# Patient Record
Sex: FEMALE | Race: WHITE | NOT HISPANIC OR LATINO | Employment: FULL TIME | URBAN - METROPOLITAN AREA
[De-identification: names, ages, dates, MRNs, and addresses within clinical notes are randomized per-mention and may not be internally consistent; named-entity substitution may affect disease eponyms.]

---

## 2021-04-20 ENCOUNTER — OFFICE VISIT CONVERTED (OUTPATIENT)
Dept: FAMILY MEDICINE CLINIC | Facility: CLINIC | Age: 46
End: 2021-04-20
Attending: NURSE PRACTITIONER

## 2021-04-20 ENCOUNTER — HOSPITAL ENCOUNTER (OUTPATIENT)
Dept: LAB | Facility: HOSPITAL | Age: 46
Discharge: HOME | End: 2021-04-20
Attending: NURSE PRACTITIONER

## 2021-04-20 LAB
ALBUMIN SERPL-MCNC: 4.2 G/DL (ref 3.5–5)
ALBUMIN/GLOB SERPL: 1.2 {RATIO} (ref 1.4–2.6)
ALP SERPL-CCNC: 47 U/L (ref 42–98)
ALT SERPL-CCNC: 46 U/L (ref 10–40)
ANION GAP SERPL CALC-SCNC: 17 MMOL/L (ref 8–19)
AST SERPL-CCNC: 50 U/L (ref 15–50)
BASOPHILS # BLD AUTO: 0.05 10*3/UL (ref 0–0.2)
BASOPHILS NFR BLD AUTO: 0.9 % (ref 0–3)
BILIRUB SERPL-MCNC: 0.54 MG/DL (ref 0.2–1.3)
BUN SERPL-MCNC: 13 MG/DL (ref 5–25)
BUN/CREAT SERPL: 20 {RATIO} (ref 6–20)
CALCIUM SERPL-MCNC: 9.8 MG/DL (ref 8.7–10.4)
CHLORIDE SERPL-SCNC: 100 MMOL/L (ref 99–111)
CHOLEST SERPL-MCNC: 107 MG/DL (ref 107–200)
CHOLEST/HDLC SERPL: 3.1 {RATIO} (ref 3–6)
CONV ABS IMM GRAN: 0.02 10*3/UL (ref 0–0.2)
CONV CO2: 23 MMOL/L (ref 22–32)
CONV CREATININE URINE, RANDOM: 93.1 MG/DL (ref 10–300)
CONV IMMATURE GRAN: 0.4 % (ref 0–1.8)
CONV MICROALBUM.,U,RANDOM: 16.3 MG/L (ref 0–20)
CONV TOTAL PROTEIN: 7.6 G/DL (ref 6.3–8.2)
CREAT UR-MCNC: 0.65 MG/DL (ref 0.5–0.9)
DEPRECATED RDW RBC AUTO: 44 FL (ref 36.4–46.3)
EOSINOPHIL # BLD AUTO: 0.12 10*3/UL (ref 0–0.7)
EOSINOPHIL # BLD AUTO: 2.3 % (ref 0–7)
ERYTHROCYTE [DISTWIDTH] IN BLOOD BY AUTOMATED COUNT: 12.8 % (ref 11.7–14.4)
EST. AVERAGE GLUCOSE BLD GHB EST-MCNC: 183 MG/DL
GFR SERPLBLD BASED ON 1.73 SQ M-ARVRAT: >60 ML/MIN/{1.73_M2}
GLOBULIN UR ELPH-MCNC: 3.4 G/DL (ref 2–3.5)
GLUCOSE SERPL-MCNC: 210 MG/DL (ref 65–99)
HBA1C MFR BLD: 8 % (ref 3.5–5.7)
HCT VFR BLD AUTO: 41.8 % (ref 37–47)
HDLC SERPL-MCNC: 35 MG/DL (ref 40–60)
HGB BLD-MCNC: 13.7 G/DL (ref 12–16)
LDLC SERPL CALC-MCNC: <10 MG/DL (ref 70–100)
LYMPHOCYTES # BLD AUTO: 2.11 10*3/UL (ref 1–5)
LYMPHOCYTES NFR BLD AUTO: 40 % (ref 20–45)
MCH RBC QN AUTO: 30.7 PG (ref 27–31)
MCHC RBC AUTO-ENTMCNC: 32.8 G/DL (ref 33–37)
MCV RBC AUTO: 93.7 FL (ref 81–99)
MICROALBUMIN/CREAT UR: 17.5 MG/G{CRE} (ref 0–35)
MONOCYTES # BLD AUTO: 0.47 10*3/UL (ref 0.2–1.2)
MONOCYTES NFR BLD AUTO: 8.9 % (ref 3–10)
NEUTROPHILS # BLD AUTO: 2.51 10*3/UL (ref 2–8)
NEUTROPHILS NFR BLD AUTO: 47.5 % (ref 30–85)
NRBC CBCN: 0 % (ref 0–0.7)
OSMOLALITY SERPL CALC.SUM OF ELEC: 288 MOSM/KG (ref 273–304)
PLATELET # BLD AUTO: 258 10*3/UL (ref 130–400)
PMV BLD AUTO: 11.1 FL (ref 9.4–12.3)
POTASSIUM SERPL-SCNC: 4.1 MMOL/L (ref 3.5–5.3)
RBC # BLD AUTO: 4.46 10*6/UL (ref 4.2–5.4)
SODIUM SERPL-SCNC: 136 MMOL/L (ref 135–147)
T4 FREE SERPL-MCNC: 1.3 NG/DL (ref 0.9–1.8)
TRIGL SERPL-MCNC: 343 MG/DL (ref 40–150)
TSH SERPL-ACNC: 1.46 M[IU]/L (ref 0.27–4.2)
VLDLC SERPL-MCNC: 69 MG/DL (ref 5–37)
WBC # BLD AUTO: 5.28 10*3/UL (ref 4.8–10.8)

## 2021-05-11 NOTE — H&P
History and Physical      Patient Name: Katy Denise   Patient ID: [id number]   Sex: Female   YOB: 1975        Visit Date: April 20, 2021    Provider: DELIO Gutierrez   Location: [facility]   Location Address: [facility], [address]  [place], [place]  [id number]   Location Phone: [phone]          Chief Complaint  · new patient/establish care      History Of Present Illness  Katy Denise is a 46 year old female who presents for evaluation and treatment of:      Pt is here to establish care with a new provider. Her previous was in [place], Addie Simon at [facility]. Her last visit was in January.     Labs: she had her A1C checked on Jan 7th, unsure of the last full set of labs. Is fasting and can do today. She has brought her biometric screening form to be completed.    Pap Smear: no longer, had full Hysterectomy.    Mammogram: 11/2020 in [place]    Flu: declined    Covid: had not received yet    Pt needs refills on all her medications.    Pt is wanting to discuss weight loss. She has been doing dietary changes and working out and cannot lose weight. She would like some help in doing so. She thinks it might be related to her anxiety/depression that was never addressed by her previous provider.           Past Medical History  Disease Name Date Onset Notes   Allergies --  --    Anxiety --  --    Arthritis --  --    Diabetes --  --    Hyperlipidemia --  --    Migraine --  --          Past Surgical History  Procedure Name Date Notes   C section --  --    carpal tunnel --  bilateral   Hysterectomy --  --    Microdiscectomy of thoracic or lumbar spine --  --    Tubal ligation --  --          Medication List  Name Date Started Instructions   Benadryl 25 mg oral capsule  take 2 capsules (50 mg) by oral route once daily at bedtime as needed   Crestor 5 mg oral tablet 04/20/2021 take 1 tablet (5 mg) by oral route once daily for 90 days   EpiPen 0.3 mg/0.3 mL  injection auto-injector  inject 0.3 milliliter (0.3 mg) by intramuscular route once as needed for anaphylaxis   hyoscyamine sulfate 0.125 mg oral tablet  take 1 tablet (0.125 mg) by oral route 3 times per day   losartan 25 mg oral tablet 04/20/2021 take 1 tablet (25 mg) by oral route once daily for 90 days   melatonin 10 mg oral capsule  take 1 capsule by oral route once a day (at bedtime)   metformin 1,000 mg oral tablet extended release 24hr 04/20/2021 take 1 tablet (1,000 mg) by oral route 2 times per day with meals for 90 days   multivitamin oral tablet  take 1 tablet by oral route daily   Omega 3-6-9 Complex 400-400-400 mg oral capsule  take 1 capsule by oral route daily   omeprazole 20 mg oral capsule,delayed release(DR/EC) 04/20/2021 take 2 capsules (40 mg) by oral route once daily before a meal for 90 days   propranolol 80 mg oral capsule,extended release 24 hr 04/20/2021 take 1 capsule (80 mg) by oral route once daily for 90 days         Allergy List  Allergen Name Date Reaction Notes   NO KNOWN DRUG ALLERGIES --  --  --        Allergies Reconciled  Family Medical History  Disease Name Relative/Age Notes   Stroke  --    Heart Disease  --    Cirrhosis of liver  --    Diabetes  --          Social History  Finding Status Start/Stop Quantity Notes   Alcohol Current some day --/-- --  2/week   Exercises daily --  --/-- --  --    Family History of Substance Use/Abuse --  --/-- --  --    Tobacco Former --/-- 1/4 ppd off and on for 30 years. .23ppd         Review of Systems  · Constitutional  o Admits  o : weight gain  o Denies  o : fever, fatigue, weight loss  · Cardiovascular  o Denies  o : lower extremity edema, claudication, chest pressure, palpitations  · Respiratory  o Denies  o : shortness of breath, wheezing, cough, hemoptysis, dyspnea on exertion  · Gastrointestinal  o Denies  o : nausea, vomiting, diarrhea, constipation, abdominal pain  · Psychiatric  o Admits  o : anxiety, depression      Vitals  Date  "Time BP Position Site L\R Cuff Size HR RR TEMP (F) WT  HT  BMI kg/m2 BSA m2 O2 Sat FR L/min FiO2 HC       04/20/2021 08:24 /57 Sitting    73 - R   215lbs 6oz 5'  6\" 34.76 2.13 97 %            Physical Examination  · Constitutional  o Appearance  o : well-nourished, well developed, alert, in no acute distress  · Ears, Nose, Mouth and Throat  o Ears  o :   § External Ears  § : appearance within normal limits, no lesions present  § Otoscopic Examination  § : tympanic membrane appearance within normal limits bilaterally without perforations, mobility normal  o Nose  o :   § External Nose  § : normal stucture noted.  § Intranasal Exam  § : no swelling, reddness, turbs normal kiana.  o Oral Cavity  o :   § Oral Mucosa  § : oral mucosa normal without pallor or cyanosis  § Lips  § : lip appearance normal  § Teeth  § : normal dentition for age  § Gums  § : gums pink, non-swollen, no bleeding present  § Tongue  § : tongue appearance normal  § Palate  § : hard palate normal, soft palate appearance normal  o Throat  o :   § Oropharynx  § : no inflammation or lesions present, tonsils within normal limits  · Respiratory  o Respiratory Effort  o : breathing unlabored  o Auscultation of Lungs  o : normal breath sounds throughout  · Cardiovascular  o Heart  o :   § Auscultation of Heart  § : regular rate and rhythm, no murmurs, gallops or rubs  § Palpation of Heart  § : normal apical impulse, no cardiac thrill present  o Peripheral Vascular System  o :   § Extremities  § : no cyanosis, clubbing or edema; less than 2 second refill noted  · Gastrointestinal  o Abdominal Examination  o : abdomen nontender to palpation, tone normal without rigidity or guarding, no masses present, abdominal contour scaphoid  o Liver and spleen  o : no hepatomegaly present, liver nontender to palpation, spleen not palpable  · Skin and Subcutaneous Tissue  o General Inspection  o : no rashes or lesions present, no areas of " discoloration  · Neurologic  o Mental Status Examination  o :   § Orientation  § : grossly oriented to person, place and time  o Cranial Nerves  o : cranial nerves intact and symmetric throughout  · Psychiatric  o Mood and Affect  o : mood normal, affect appropriate, denies any SI/HI          Assessment  · Annual physical exam     V70.0/Z00.00  · Allergic rhinitis due to allergen     477.9/J30.9  · Anxiety disorder     300.00/F41.9  · Depression     311/F32.9  · Diabetes mellitus, type 2     250.00/E11.9  · Essential hypertension     401.9/I10  · Hyperlipidemia     272.4/E78.5  · Screening for depression     V79.0/Z13.89  · Weight gain     783.1/R63.5    Problems Reconciled  Plan  · Orders  o Urine microalbumin (53503) - 250.00/E11.9 - 04/20/2021  o Diabetic Dilated Eye Exam Consult with Ophthalmology/Optometry (DMEYE) - 250.00/E11.9 - 04/20/2021  o ACO-18: Positive screen for clinical depression using a standardized tool and a follow-up plan documented () - V79.0/Z13.89 - 04/20/2021  o CBC with Auto Diff [facility] ([id number]) - 250.00/E11.9 - 04/20/2021  o CMP [facility] (81590) - 250.00/E11.9 - 04/20/2021  o Hgb A1c [facility] (13288) - 250.00/E11.9 - 04/20/2021  o Lipid Panel [facility] (42716) - 272.4/E78.5 - 04/20/2021  o Thyroid Profile (38207, 77670, THYII) - 250.00/E11.9 - 04/20/2021  o ACO-18: Positive screen for clinical depression using a standardized tool and a follow-up plan documented () - - 04/20/2021  o ACO-14: Influenza immunization was not administered for reasons documented [facility] () - - 04/20/2021  o ACO-39: Current medications updated and reviewed (, 1159D) - - 04/20/2021  · Medications  o Lexapro 20 mg oral tablet   SIG: take 1/2tab q day for 2 weeks then 1 tablet (20 mg) by oral route once daily   DISP: (90) Tablet with 1 refills  Prescribed on 04/20/2021     o Crestor 5 mg oral tablet   SIG: take 1 tablet (5 mg) by oral route once daily for 90 days   DISP: (90) Tablet with 1 refills  Adjusted on 04/20/2021  "    o losartan 25 mg oral tablet   SIG: take 1 tablet (25 mg) by oral route once daily for 90 days   DISP: (90) Tablet with 1 refills  Adjusted on 04/20/2021     o metformin 1,000 mg oral tablet extended release 24hr   SIG: take 1 tablet (1,000 mg) by oral route 2 times per day with meals for 90 days   DISP: (180) Tablet with 1 refills  Adjusted on 04/20/2021     o omeprazole 20 mg oral capsule,delayed release(DR/EC)   SIG: take 2 capsules (40 mg) by oral route once daily before a meal for 90 days   DISP: (180) Capsule with 1 refills  Adjusted on 04/20/2021     o propranolol 80 mg oral capsule,extended release 24 hr   SIG: take 1 capsule (80 mg) by oral route once daily for 90 days   DISP: (90) Capsule with 1 refills  Adjusted on 04/20/2021     o Medications have been Reconciled  o Transition of Care or Provider Policy  · Instructions  o Reviewed health maintenance flowsheet and updated information. Orders were placed and/or patient's response was documented.  o Patient was given an SSRI/SSNRI medication and warned of possible side effects of the medication including potential for increased risk of suicidal thoughts and feelings. Patient was instructed that if they begin to exhibit any of these effects they will discontinue the medication immediately and contact our office or the  ASA.  o Patient agrees to a \"No Self Harm\" contract. Patient will either call , [facility], ER, [facility], [facility].  o Patient was given an SSRI/SSNRI medication and warned of possible side effects of the medication including potential for increased risk of suicidal thoughts and feelings. Patient was instructed that if they begin to exhibit any of these effects they will discontinue the medication immediately and contact our office or the  ASA.  o Patient agrees to a \"No Self Harm\" contract. Patient will either call us, 911, ER, [facility], [facility].  o Patient advised to " monitor blood pressure (B/P) at home and journal readings. Patient informed that a B/P reading at home of more than 130/80 is considered hypertension. For readings greater eesp375/90 or higher patient is advised to follow up in the office with readings for management. Patient advised to limit sodium intake.  o Advised that cheeses and other sources of dairy fats, animal fats, fast food, and the extras (candy, pastries, pies, doughnuts and cookies) all contain LDL raising nutrients. Advised to increase fruits, vegetables, whole grains, and to monitor portion sizes.   o Depression Screen completed and scanned into the EMR under the designated folder within the patient's documents.  o Today's PHQ-9 result is _11__  o The provider screening met the required time of 15 minutes.  o Patient was educated/instructed on their diagnosis, treatment and medications prior to discharge from the clinic today.  o Patient counseled to reduce calorie intake.  o Patient was instructed to exercise regularly.  o Patient instructed to seek medical attention urgently for new or worsening symptoms.  o Call the office with any concerns or questions.  · Disposition  o Call or Return if symptoms worsen or persist.  o Return Visit Request in/on 2 months +/- 2 weeks ([id number]).            Electronically Signed by: DELIO Gutierrez -Author on April 20, 2021 09:09:53 AM

## 2021-05-14 VITALS
WEIGHT: 215.37 LBS | HEART RATE: 73 BPM | HEIGHT: 66 IN | BODY MASS INDEX: 34.61 KG/M2 | SYSTOLIC BLOOD PRESSURE: 124 MMHG | OXYGEN SATURATION: 97 % | DIASTOLIC BLOOD PRESSURE: 57 MMHG

## 2021-05-24 ENCOUNTER — OFFICE VISIT CONVERTED (OUTPATIENT)
Dept: FAMILY MEDICINE CLINIC | Facility: CLINIC | Age: 46
End: 2021-05-24
Attending: NURSE PRACTITIONER

## 2021-05-24 ENCOUNTER — HOSPITAL ENCOUNTER (OUTPATIENT)
Dept: GENERAL RADIOLOGY | Facility: HOSPITAL | Age: 46
Discharge: HOME | End: 2021-05-24
Attending: NURSE PRACTITIONER

## 2021-06-03 ENCOUNTER — HOSPITAL ENCOUNTER (OUTPATIENT)
Dept: GENERAL RADIOLOGY | Facility: HOSPITAL | Age: 46
Discharge: HOME | End: 2021-06-03
Attending: NURSE PRACTITIONER

## 2021-06-06 NOTE — PROGRESS NOTES
Progress Note      Patient Name: Katy Denise   Patient ID: [id number]   Sex: Female   YOB: 1975    Primary Care Provider: Nataliya KEBEDE   Referring Provider: Nataliya KEBEDE    Visit Date: May 24, 2021    Provider: DELIO Gutierrez   Location: [facility]   Location Address: [address] [place] [address], [address]  [place], [place]  [id number]   Location Phone: [phone]          Chief Complaint  · Left knee pain      History Of Present Illness  Katy Denise is a 46 year old female who presents for evaluation and treatment of:      Pt has c/o left knee pain. Pt states her knee has been popping and swelling for the past 2 wks. No known injury. 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 Pt walks in the gym and on a track. Pt has followed [employer]. Pt has taken Tylenol with little relief. Pt can't take ibuprofen or naproxen.  She reports it is not giving out but feels like it might if she pushed herself to hard.    Pt had mammogram in [place] 11/2020.  Will get records.    Pt has DM eye exam paper and will bring to office.       Past Medical History  Disease Name Date Onset Notes   Allergic rhinitis due to allergen --  --    Anxiety disorder --  --    Arthritis --  --    Diabetes mellitus, type 2 --  --    GERD (gastroesophageal reflux disease) --  --    Hyperlipidemia --  --    Migraine --  --          Past Surgical History  Procedure Name Date Notes   C section --  --    carpal tunnel --  bilateral   Hysterectomy --  --    Microdiscectomy of thoracic or lumbar spine --  --    Tubal ligation --  --          Medication List  Name Date Started Instructions   Benadryl 25 mg oral capsule  take 2 capsules (50 mg) by oral route once daily at bedtime as needed   Crestor 5 mg oral tablet 04/20/2021 take 1 tablet (5 mg) by oral route once daily for 90 days   EpiPen 0.3 mg/0.3 mL injection auto-injector  inject 0.3 milliliter (0.3 mg) by intramuscular route once as  needed for anaphylaxis   hyoscyamine sulfate 0.125 mg oral tablet  take 1 tablet (0.125 mg) by oral route 3 times per day   Lexapro 20 mg oral tablet 04/20/2021 take 1/2tab q day for 2 weeks then 1 tablet (20 mg) by oral route once daily   losartan 25 mg oral tablet 04/20/2021 take 1 tablet (25 mg) by oral route once daily for 90 days   melatonin 10 mg oral capsule  take 1 capsule by oral route once a day (at bedtime)   metformin 1,000 mg oral tablet 05/20/2021 take 1 tablet by oral route once a day (at bedtime) for 90 days   multivitamin oral tablet  take 1 tablet by oral route daily   Omega 3-6-9 Complex 400-400-400 mg oral capsule  take 1 capsule by oral route daily   omeprazole 20 mg oral capsule,delayed release(DR/EC) 04/20/2021 take 2 capsules (40 mg) by oral route once daily before a meal for 90 days   propranolol 80 mg oral capsule,extended release 24 hr 04/20/2021 take 1 capsule (80 mg) by oral route once daily for 90 days   Xigduo XR 10-1,000 mg oral tablet, IR - ER, biphasic 24hr 05/20/2021 take 1 tablet by oral route once daily in the morning with food for 90 days         Allergy List  Allergen Name Date Reaction Notes   NO KNOWN DRUG ALLERGIES --  --  --          Family Medical History  Disease Name Relative/Age Notes   Stroke  --    Heart Disease  --    Cirrhosis of liver  --    Diabetes  --          Social History  Finding Status Start/Stop Quantity Notes   Alcohol Current some day --/-- --  2/week   Exercises daily --  --/-- --  --    Family History of Substance Use/Abuse --  --/-- --  --    Tobacco Former --/-- 1/4 ppd off and on for 30 years. .23ppd         Immunizations  NameDate Admin Mfg Trade Name Lot Number Route Inj VIS Given VIS Publication   InfluenzaRefused 05/24/2021 NE Not Entered  NE NE     Comments:          Review of Systems  · Constitutional  o Denies  o : fever, fatigue, weight loss, weight gain  · Cardiovascular  o Denies  o : lower extremity edema, claudication, chest pressure,  "palpitations  · Respiratory  o Denies  o : shortness of breath, wheezing, cough, hemoptysis, dyspnea on exertion  · Gastrointestinal  o Denies  o : nausea, vomiting, diarrhea, constipation, abdominal pain  · Musculoskeletal  o Admits  o : knee pain on the left  · Psychiatric  o Admits  o : anxiety      Vitals  Date Time BP Position Site L\R Cuff Size HR RR TEMP (F) WT  HT  BMI kg/m2 BSA m2 O2 Sat FR L/min FiO2        05/24/2021 07:44 /60 Sitting    62 - R   213lbs 16oz 5'  6\" 34.54 2.13 98 %  21%          Physical Examination  · Constitutional  o Appearance  o : well-nourished, well developed, alert, in no acute distress  · Respiratory  o Respiratory Effort  o : breathing unlabored  o Auscultation of Lungs  o : normal breath sounds throughout  · Cardiovascular  o Heart  o :   § Auscultation of Heart  § : regular rate and rhythm, no murmurs, gallops or rubs  § Palpation of Heart  § : normal apical impulse, no cardiac thrill present  o Peripheral Vascular System  o :   § Extremities  § : no cyanosis, clubbing or edema; less than 2 second refill noted  · Musculoskeletal  o Right Lower Extremity  o :   § Inspection/Palpation  § : no joint or limb tenderness to palpation  § Range of Motion  § : range of motion normal, no joint crepitations present, no pain on motion  o Left Lower Extremity  o :   § Inspection/Palpation  § : no joint or limb tenderness to palpation  § Range of Motion  § : range of motion normal, pain in left knee with ROM, no swelling noted  · Skin and Subcutaneous Tissue  o General Inspection  o : no rashes or lesions present, no areas of discoloration  · Neurologic  o Mental Status Examination  o :   § Orientation  § : grossly oriented to person, place and time  o Cranial Nerves  o : cranial nerves intact and symmetric throughout  · Psychiatric  o Mood and Affect  o : mood normal, affect appropriate          Assessment  · Allergic rhinitis due to allergen     477.9/J30.9  · Anxiety " "disorder     300.00/F41.9  · Diabetes mellitus, type 2     250.00/E11.9  · Essential hypertension     401.9/I10  · Hyperlipidemia     272.4/E78.5  · Left knee pain     719.46/M25.562      Plan  · Orders  o ACO-39: Current medications updated and reviewed (, 1159F) - - 05/24/2021  o Xray knee left [facility] ([id number]) - 719.46/M25.562 - 05/24/2021  · Instructions  o Patient agrees to a \"No Self Harm\" contract. Patient will either call , [facility], , [facility], [facility].  o Advised that cheeses and other sources of dairy fats, animal fats, fast food, and the extras (candy, pastries, pies, doughnuts and cookies) all contain LDL raising nutrients. Advised to increase fruits, vegetables, whole grains, and to monitor portion sizes.   o Patient was educated/instructed on their diagnosis, treatment and medications prior to discharge from the clinic today.  o Call the office with any concerns or questions.  · Disposition  o Call or Return if symptoms worsen or persist.            Electronically Signed by: DELIO Gutierrez -Author on May 24, 2021 08:02:03 AM  "

## 2021-06-07 DIAGNOSIS — M25.562 ACUTE PAIN OF LEFT KNEE: Primary | ICD-10-CM

## 2021-06-15 ENCOUNTER — OFFICE VISIT (OUTPATIENT)
Dept: ORTHOPEDIC SURGERY | Facility: CLINIC | Age: 46
End: 2021-06-15

## 2021-06-15 VITALS — HEART RATE: 98 BPM | WEIGHT: 212.2 LBS | HEIGHT: 66 IN | BODY MASS INDEX: 34.1 KG/M2 | OXYGEN SATURATION: 97 %

## 2021-06-15 DIAGNOSIS — M22.2X2 PATELLOFEMORAL SYNDROME OF LEFT KNEE: Primary | ICD-10-CM

## 2021-06-15 DIAGNOSIS — M17.12 PRIMARY OSTEOARTHRITIS OF LEFT KNEE: ICD-10-CM

## 2021-06-15 PROCEDURE — 99203 OFFICE O/P NEW LOW 30 MIN: CPT | Performed by: ORTHOPAEDIC SURGERY

## 2021-06-15 RX ORDER — MELATONIN 10 MG
CAPSULE ORAL
COMMUNITY
End: 2021-08-02

## 2021-06-15 RX ORDER — LOSARTAN POTASSIUM 25 MG/1
TABLET ORAL
COMMUNITY
Start: 2021-04-20 | End: 2021-08-25 | Stop reason: SDUPTHER

## 2021-06-15 RX ORDER — DICLOFENAC SODIUM 75 MG/1
75 TABLET, DELAYED RELEASE ORAL 2 TIMES DAILY
Qty: 60 TABLET | Refills: 1 | Status: SHIPPED | OUTPATIENT
Start: 2021-06-15 | End: 2021-08-02

## 2021-06-15 RX ORDER — DIPHENHYDRAMINE HCL 25 MG
25 CAPSULE ORAL EVERY 6 HOURS PRN
COMMUNITY

## 2021-06-15 RX ORDER — EPINEPHRINE 0.3 MG/.3ML
0.3 INJECTION SUBCUTANEOUS
COMMUNITY

## 2021-06-15 RX ORDER — ROSUVASTATIN CALCIUM 5 MG/1
TABLET, COATED ORAL
COMMUNITY
Start: 2021-05-24 | End: 2021-08-25 | Stop reason: SDUPTHER

## 2021-06-15 RX ORDER — HYOSCYAMINE SULFATE 0.125 MG
0.12 TABLET ORAL EVERY 6 HOURS PRN
COMMUNITY

## 2021-06-15 RX ORDER — ESCITALOPRAM OXALATE 20 MG/1
TABLET ORAL
COMMUNITY
Start: 2021-04-20 | End: 2021-06-23 | Stop reason: SDUPTHER

## 2021-06-15 RX ORDER — DAPAGLIFLOZIN AND METFORMIN HYDROCHLORIDE 10; 1000 MG/1; MG/1
TABLET, FILM COATED, EXTENDED RELEASE ORAL
COMMUNITY
Start: 2021-05-24 | End: 2021-11-02 | Stop reason: SDUPTHER

## 2021-06-15 RX ORDER — PROPRANOLOL HYDROCHLORIDE 80 MG/1
CAPSULE, EXTENDED RELEASE ORAL
COMMUNITY
Start: 2021-04-20 | End: 2021-08-25 | Stop reason: SDUPTHER

## 2021-06-15 NOTE — PROGRESS NOTES
"Chief Complaint  Initial Evaluation of the Left Knee     Subjective      Katy Denise presents to [facility] for an evaluation of left knee. Patient states left knee pain began in mid-may with no known injury or trauma. She localizes pain on the medial aspect of her knee. She also complains of pain under her patella. She states her left knee feels like it may lock up sometimes. Patient does have diabetes and takes oral medication for this.     No Known Allergies     Social History     Socioeconomic History   • Marital status:      Spouse name: Not on file   • Number of children: Not on file   • Years of education: Not on file   • Highest education level: Not on file   Tobacco Use   • Smoking status: Former Smoker     Packs/day: 0.25   • Smokeless tobacco: Never Used   • Tobacco comment: off and on for 30 years; .23ppd   Vaping Use   • Vaping Use: Never used   Substance and Sexual Activity   • Alcohol use: Yes     Comment: current some day; 2/week        Review of Systems     Objective   Vital Signs:   Pulse 98   Ht 167.6 cm (66\")   Wt 96.3 kg (212 lb 3.2 oz)   SpO2 97%   BMI 34.25 kg/m²       Physical Exam  Constitutional:       Appearance: Normal appearance. He is well-developed and normal weight.   HENT:      Head: Normocephalic.      Right Ear: Hearing and external ear normal.      Left Ear: Hearing and external ear normal.      Nose: Nose normal.   Eyes:      Conjunctiva/sclera: Conjunctivae normal.   Cardiovascular:      Rate and Rhythm: Normal rate.   Pulmonary:      Effort: Pulmonary effort is normal.      Breath sounds: No wheezing or rales.   Abdominal:      Palpations: Abdomen is soft.      Tenderness: There is no abdominal tenderness.   Musculoskeletal:      Cervical back: Normal range of motion.   Skin:     Findings: No rash.   Neurological:      Mental Status: He is alert and oriented to person, place, and time.   Psychiatric:         Mood and Affect: Mood and " affect normal.         Judgment: Judgment normal.       Ortho Exam      LEFT KNEE: No swelling, skin discoloration or atrophy. Dorsal Pedal Pulse 2+, posteriror tibialis pulse 2+. Good strength to hamstrings, quadriceps, dorsiflexors and plantar flexors. Full weight bearing. Non-antalgic gait. Sensation grossly intact. Neurovascular intact. Skin intact. Negative Lachman. Negative posterior sag. Stable to valgus/varus stress. Full extension. Full flexion. There is some catching with knee range of motion. Tender medial joint line. Non-tender lateral joint line.       Procedures      Imaging Results (Most Recent)     None           Result Review :       XR Knee 3 View Left    Result Date: 2021  Narrative:           [facility]           [facility]           PACS RADIOLOGY REPORT Patient: MEHUL ACEVEDO     Acct: [id number]     Report: #[id number] UNIT #: [id number]     DOS: 2021 0817     Order #: [id number] Location: [facility]     : 1975 ORDERING: MIRELA OLSON DICTATING: COLBY ANNA #: [id number]     EXAM: KNLT - KNEE 3 VIEWS LEFT REASON FOR EXAM: REASON FOR VISIT: LEFT KNEE PAIN Signed -------------------------------------------------------------------------------------------------------------------- PROCEDURE: KNEE 3 VIEWS LEFT     COMPARISON: None     INDICATIONS: GENERALIZED LEFT KNEE PAIN FOR 2 WEEKS. NO INJURY.     FINDINGS:   BONES:    SOFT TISSUES: Negative.  No visible soft tissue swelling.    EFFUSION: None visible.    OTHER: Negative.       CONCLUSION: No acute disease.        COLBY ANNA MD         Electronically Signed and Approved By: COLBY ANNA MD on 2021 at 8:41                 MRI Knee Left Without Contrast    Result Date: 6/3/2021  Narrative:           [facility]           PACS RADIOLOGY REPORT Patient: MEHUL ACEVEDO     Acct: [id number]     Report: #[id number] UNIT #: [id number]      DOS: 2021 1500     Order #: [id number] Location: [facility]     : 1975 ORDERING: MIRELA OLSON DICTATING: Chapis Bowens #: [id number]     EXAM: KNEEL - MRI LEFT KNEE wo CONTRAST REASON FOR EXAM: REASON FOR VISIT: LT KNEE PAIN Signed -------------------------------------------------------------------------------------------------------------------- PROCEDURE: MRI LEFT KNEE WO CONTRAST     COMPARISON: [facility], , KNEE 3 VIEWS LT, 2021, 8:27.     INDICATIONS: ANTERIOR AND MEDIAL LEFT KNEE PAIN AND SWELLING. NO KNOWN INJURY.     TECHNIQUE: A complete multi-planar MRI was performed.       FINDINGS:   MEDIAL COMPARTMENT  MEDIAL MENISCUS: 0  HYALINE CARTILAGE: Mild chondromalacia and mild cartilage irregularity overlying the joint surface   of the medial femoral  BONES: Normal.  No marrow pathology, fracture, or significant arthropathy.    MCL AND MEDIAL CAPSULE: Normal medial collateral ligament and medial capsule.       LATERAL COMPARTMENT  LATERAL MENISCUS: Normal.  No visible tear or significant degeneration.    HYALINE CARTILAGE: Normal.  No visible defect.    BONES: Normal.  No marrow pathology, fracture, or significant arthropathy.    LCL/POSTEROLAT. COMPLEX: Normal lateral collateral ligament, fascicles, lateral capsule and   ligaments.       ACL: Normal appearing ligament.    PCL: Normal appearing ligament.    MENISCOFEMORAL: Normal meniscofemoral ligaments.    PATELLOFEMORAL: Normal.    EFFUSION: None.  No synovitis or loose bodies.    OTHER: Negative.       CONCLUSION:   1. Mild chondromalacia and mild cartilage irregularity overlying the joint surface of the medial   femoral      CHAPIS BOWENS MD         Electronically Signed and Approved By: CHAPIS BOWENS MD on 2021 at 16:26                         Assessment and Plan     DX: Left knee patellofemoral syndrome   Left knee osteoarthritis     Discussed treatment plans and diagnosis with the  patient. Patient was given some at home exercises in office today. She was given a prescription for PT. She was also provided with a prescription for an anti-inflammatory today.     Call or return if worsening symptoms.    Follow Up     Patient will follow-up PRN.       Patient was given instructions and counseling regarding her condition or for health maintenance advice. Please see specific information pulled into the AVS if appropriate.     Scribed for Codie Morales MD by Jasmin Phipps.  06/15/21   15:25 EDT    I have personally performed the services described in this document as scribed by the above individual and it is both accurate and complete.  Codie Morales MD 06/15/21  15:25 EDT

## 2021-06-22 PROBLEM — J30.9 ALLERGIC RHINITIS DUE TO ALLERGEN: Status: ACTIVE | Noted: 2021-06-22

## 2021-06-22 PROBLEM — I10 ESSENTIAL HYPERTENSION: Status: ACTIVE | Noted: 2021-06-22

## 2021-06-22 PROBLEM — F41.9 ANXIETY DISORDER: Status: ACTIVE | Noted: 2021-06-22

## 2021-06-22 PROBLEM — G43.909 MIGRAINE: Status: ACTIVE | Noted: 2021-06-22

## 2021-06-22 PROBLEM — E78.5 HYPERLIPIDEMIA: Status: ACTIVE | Noted: 2021-06-22

## 2021-06-22 PROBLEM — E11.9 DIABETES MELLITUS, TYPE 2: Status: ACTIVE | Noted: 2021-06-22

## 2021-06-22 PROBLEM — K21.9 GERD (GASTROESOPHAGEAL REFLUX DISEASE): Status: ACTIVE | Noted: 2021-06-22

## 2021-06-22 PROBLEM — M19.90 ARTHRITIS: Status: ACTIVE | Noted: 2021-06-22

## 2021-06-23 ENCOUNTER — OFFICE VISIT (OUTPATIENT)
Dept: FAMILY MEDICINE CLINIC | Facility: CLINIC | Age: 46
End: 2021-06-23

## 2021-06-23 VITALS
BODY MASS INDEX: 33.59 KG/M2 | HEIGHT: 66 IN | WEIGHT: 209 LBS | HEART RATE: 68 BPM | SYSTOLIC BLOOD PRESSURE: 110 MMHG | OXYGEN SATURATION: 94 % | DIASTOLIC BLOOD PRESSURE: 62 MMHG

## 2021-06-23 DIAGNOSIS — E11.69 TYPE 2 DIABETES MELLITUS WITH OTHER SPECIFIED COMPLICATION, WITHOUT LONG-TERM CURRENT USE OF INSULIN (HCC): ICD-10-CM

## 2021-06-23 DIAGNOSIS — I10 ESSENTIAL HYPERTENSION: ICD-10-CM

## 2021-06-23 DIAGNOSIS — F41.9 ANXIETY DISORDER, UNSPECIFIED TYPE: ICD-10-CM

## 2021-06-23 DIAGNOSIS — F33.0 MILD EPISODE OF RECURRENT MAJOR DEPRESSIVE DISORDER (HCC): Primary | ICD-10-CM

## 2021-06-23 PROCEDURE — 99214 OFFICE O/P EST MOD 30 MIN: CPT | Performed by: NURSE PRACTITIONER

## 2021-06-23 RX ORDER — ESCITALOPRAM OXALATE 20 MG/1
20 TABLET ORAL DAILY
Qty: 90 TABLET | Refills: 1 | Status: SHIPPED | OUTPATIENT
Start: 2021-06-23 | End: 2022-02-07

## 2021-06-23 NOTE — PROGRESS NOTES
Chief Complaint  Depression    Subjective            Katy Denise presents to [facility]  Pt is a 2 mo f/u for depression. Pt was started on Lexapro. Pt states she wants to sleep all the time since starting. Pt was also started on Xigduo at the same time. Pt is unsure if either medication is making her fatigued or if it is due to not working at the moment. Pt has been taking the Lexapro at night. Pt will sleep till 11 am or 1 pm. Pt is used to getting up at 6 or 7 am. She is going to try changing the time she takes the medication to see if that helps.  She feels like the medication is really helping with her anxiety and depression.      Pt brought DM eye exam to appt for our records.     Depression        PMH  Past Medical History:   Diagnosis Date   • Allergic rhinitis due to allergen    • Anxiety disorder    • Arthritis    • Diabetes mellitus, type 2 (CMS/HCC)    • Essential hypertension    • GERD (gastroesophageal reflux disease)    • Hyperlipidemia    • Migraine        ALLERGY  No Known Allergies     SURGICALHX  Past Surgical History:   Procedure Laterality Date   • CARPAL TUNNEL RELEASE Bilateral    •  SECTION     • HYSTERECTOMY     • LUMBAR SPINE SURGERY      Microdiscectomy of thoracic or lumbar spine   • TUBAL ABDOMINAL LIGATION          SOCX  Social History     Tobacco Use   • Smoking status: Former Smoker     Packs/day: 0.25   • Smokeless tobacco: Never Used   • Tobacco comment: off and on for 30 years; .23ppd   Substance Use Topics   • Alcohol use: Yes     Comment: current some day; 2/week       FAMHX  Family History   Problem Relation Age of Onset   • Stroke Other    • Heart disease Other    • Liver disease Other         cirrhosis   • Diabetes Other         MEDSIGONLY  Current Outpatient Medications on File Prior to Visit   Medication Sig   • diclofenac (VOLTAREN) 75 MG EC tablet Take 1 tablet by mouth 2 (Two) Times a Day.   • diphenhydrAMINE (Benadryl  "Allergy) 25 mg capsule Benadryl 25 mg oral capsule take 2 capsules (50 mg) by oral route once daily at bedtime as needed   Active   • EPINEPHrine (EpiPen 2-Regino) 0.3 MG/0.3ML solution auto-injector injection 0.3 mL.   • hyoscyamine (ANASPAZ,LEVSIN) 0.125 MG tablet hyoscyamine sulfate 0.125 mg oral tablet take 1 tablet (0.125 mg) by oral route 3 times per day   Active   • losartan (COZAAR) 25 MG tablet    • Melatonin 10 MG capsule melatonin 10 mg oral capsule take 1 capsule by oral route once a day (at bedtime)   Active   • metFORMIN (GLUCOPHAGE) 1000 MG tablet    • propranolol LA (INDERAL LA) 80 MG 24 hr capsule    • rosuvastatin (CRESTOR) 5 MG tablet    • Xigduo XR  MG tablet    • [DISCONTINUED] escitalopram (LEXAPRO) 20 MG tablet      No current facility-administered medications on file prior to visit.       Health Maintenance Due   Topic Date Due   • ANNUAL PHYSICAL  Never done   • Pneumococcal Vaccine 0-64 (1 of 1 - PPSV23) Never done   • COVID-19 Vaccine (1) Never done   • Hepatitis B (1 of 3 - Risk 3-dose series) Never done   • TDAP/TD VACCINES (1 - Tdap) Never done   • HEPATITIS C SCREENING  Never done   • DIABETIC FOOT EXAM  Never done   • DIABETIC EYE EXAM  Never done       Objective     /62   Pulse 68   Ht 167.6 cm (66\")   Wt 94.8 kg (209 lb)   SpO2 94%   BMI 33.73 kg/m²       Physical Exam  Constitutional:       General: She is not in acute distress.     Appearance: Normal appearance. She is not ill-appearing.   HENT:      Head: Normocephalic and atraumatic.      Right Ear: Tympanic membrane, ear canal and external ear normal.      Left Ear: Tympanic membrane, ear canal and external ear normal.      Nose: Nose normal.      Mouth/Throat:      Pharynx: No oropharyngeal exudate or posterior oropharyngeal erythema.   Eyes:      Extraocular Movements: Extraocular movements intact.      Conjunctiva/sclera: Conjunctivae normal.      Pupils: Pupils are equal, round, and reactive to light. "   Cardiovascular:      Rate and Rhythm: Normal rate and regular rhythm.      Pulses: Normal pulses.      Heart sounds: Normal heart sounds. No murmur heard.     Pulmonary:      Effort: Pulmonary effort is normal. No respiratory distress.      Breath sounds: Normal breath sounds.   Chest:      Chest wall: No tenderness.   Abdominal:      General: Abdomen is flat. Bowel sounds are normal. There is no distension.      Palpations: Abdomen is soft. There is no mass.      Tenderness: There is no abdominal tenderness. There is no guarding.   Musculoskeletal:         General: No swelling or tenderness. Normal range of motion.      Cervical back: Normal range of motion and neck supple.   Skin:     General: Skin is warm and dry.      Findings: No rash.   Neurological:      General: No focal deficit present.      Mental Status: She is alert and oriented to person, place, and time. Mental status is at baseline.      Gait: Gait normal.   Psychiatric:         Mood and Affect: Mood normal.         Behavior: Behavior normal.         Thought Content: Thought content normal.         Judgment: Judgment normal.           Result Review :                           Assessment and Plan        Diagnoses and all orders for this visit:    1. Mild episode of recurrent major depressive disorder (CMS/[facility]) (Primary)  -     escitalopram (LEXAPRO) 20 MG tablet; Take 1 tablet by mouth Daily.  Dispense: 90 tablet; Refill: 1    2. Essential hypertension  Assessment & Plan:  Stable with current medications      3. Anxiety disorder, unspecified type  Assessment & Plan:  Stable with current medications    Orders:  -     escitalopram (LEXAPRO) 20 MG tablet; Take 1 tablet by mouth Daily.  Dispense: 90 tablet; Refill: 1    4. Type 2 diabetes mellitus with other specified complication, without long-term current use of insulin (CMS/[facility])  Assessment & Plan:  Controlled with current medications    Orders:  -     Hemoglobin A1c; Future            Follow Up      Return in about 1 month (around 7/23/2021).    Patient was given instructions and counseling regarding her condition or for health maintenance advice. Please see specific information pulled into the AVS if appropriate.

## 2021-06-24 ENCOUNTER — TREATMENT (OUTPATIENT)
Dept: PHYSICAL THERAPY | Facility: CLINIC | Age: 46
End: 2021-06-24

## 2021-06-24 DIAGNOSIS — M25.562 ACUTE PAIN OF LEFT KNEE: Primary | ICD-10-CM

## 2021-06-24 DIAGNOSIS — R29.898 WEAKNESS OF BOTH HIPS: ICD-10-CM

## 2021-06-24 PROCEDURE — 97110 THERAPEUTIC EXERCISES: CPT | Performed by: PHYSICAL THERAPIST

## 2021-06-24 PROCEDURE — 97161 PT EVAL LOW COMPLEX 20 MIN: CPT | Performed by: PHYSICAL THERAPIST

## 2021-06-24 NOTE — PROGRESS NOTES
Physical Therapy Initial Evaluation and Plan of Care    Patient: Katy Denise   : 1975  Diagnosis/ICD-10 Code:  L patellofemoral syndrome  Referring practitioner: Codie Morales MD  Date of Initial Visit: 2021  Today's Date: 2021  Patient seen for 1 sessions           Subjective Questionnaire: LEFS: 51/80 or 20-39% limited      Subjective Evaluation    History of Present Illness  Mechanism of injury: Pt reports her L knee started bothering her in the middle of May with no known mechanism of injury.  Pt has pain on medial side of knee and has cracking/popping under the knee cap.  Pt reports increased knee pain with bending, squatting, walking, and going up/down stairs.  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  Pt has relief of her pain with rest.  Pt reports ortho doctor told her it was runners knee and sent her to PT.    Past medical history: diabetes    Pain  Current pain ratin  At best pain ratin  At worst pain ratin  Quality: dull ache and sharp  Relieving factors: ice and rest  Aggravating factors: ambulation, standing and squatting  Progression: worsening    Diagnostic Tests  X-ray: abnormal (mild chondromalacia and cartilage irregularity of R medial femoral joint surface)    Patient Goals  Patient goals for therapy: decreased pain, return to sport/leisure activities and increased strength  Patient goal: go hiking 10-12 miles a day           Objective          Static Posture     Hip   Hip (Left): Externally rotated.     Tenderness   Left Knee   Tenderness in the medial joint line.     Neurological Testing     Sensation     Hip   Left Hip   Intact: light touch    Right Hip   Intact: light touch    Knee   Left Knee   Intact: light touch    Right Knee   Intact: light touch     Active Range of Motion   Left Hip   Normal active range of motion    Right Hip   Normal active range of motion  Left Knee   Normal active range of motion    Right Knee   Normal active  range of motion    Passive Range of Motion   Left Hip   Normal passive range of motion    Right Hip   Normal passive range of motion  Left Knee   Normal passive range of motion    Right Knee   Normal passive range of motion    Patellar Mobility   Left Knee Patellar tendons within functional limits include the medial, lateral, superior and inferior.     Strength/Myotome Testing     Left Hip   Planes of Motion   Flexion: 4  Extension: 4-  Abduction: 4-  Adduction: 4-    Right Hip   Planes of Motion   Flexion: 4  Extension: 4  Abduction: 4  Adduction: 4-    Left Knee   Flexion: 5  Extension: 5    Right Knee   Flexion: 5  Extension: 5    Left Ankle/Foot   Dorsiflexion: 5  Plantar flexion: 5  Inversion: 5  Eversion: 5    Right Ankle/Foot   Dorsiflexion: 5  Plantar flexion: 5  Inversion: 5  Eversion: 5    Tests     Left Hip   Negative Ely's.     Right Hip   Negative Ely's.     Left Knee   Negative anterior drawer, medial Halle, patellar apprehension, patellar compression, posterior drawer, valgus stress test at 30 degrees and varus stress test at 30 degrees.     Right Knee   Negative anterior drawer, medial Halle, patellar apprehension, patellar compression, posterior drawer, valgus stress test at 30 degrees and varus stress test at 30 degrees.     Left Hip Flexibility Comments:   Normal flexibility    Right Hip Flexibility Comments:   Normal flexibility    Ambulation     Observational Gait   Gait: within functional limits         See Exercise, Manual, and Modality Logs for complete treatment.       Assessment & Plan     Assessment  Impairments: impaired physical strength, lacks appropriate home exercise program and pain with function  Assessment details: Pt presents with limitations, noted below, that impede her ability to walk long distances, go up/down stairs, and squat.  The patient presents with a diagnosis of L knee patellofemoral syndrome and has L knee pain and B hip weakness and will benefit from  therapeutic exercises, manual therapy, and modalities to improve tolerance to functional activities. The skills of a therapist will be required to safely and effectively implement the following treatment plan to restore maximal level of function.     Prognosis: good  Functional Limitations: walking, uncomfortable because of pain, standing and unable to perform repetitive tasks  Goals  Plan Goals:  1. The patient has limited strength of the L hip.   LTG 1: 12 weeks: The patient will demonstrate 5/5 strength for L hip flexion, abduction, adduction, and extension in order to allow patient improved joint stability.   STATUS:  New   STG 1a: 6 weeks: The patient will demonstrate 4+/5 strength for L hip flexion, abduction, adduction, and extension in order to allow patient improved joint stability.   STATUS:  New       2. The patient complains of L knee pain.   LTG 2: 12 weeks:  The patient will report a pain rating of 2/10 or better in order to improve   tolerance to activities of daily living and improve sleep quality.   STATUS:  New   STG 2a: 6 weeks:  The patient will report a pain rating of 3/10 or better.   STATUS:  New      3. Mobility: Walking/Moving Around Functional Limitation     LTG 3: 12 weeks:  The patient will demonstrate 1-19% limitation by achieving a score of 65 on the Lower Extremity Functional Scale.   STATUS:  New   STG 3a: 6 weeks:  The patient will demonstrate 20-39% limitation by achieving a score of 60 on the Lower Extremity Functional Scale.     STATUS:  New     TREATMENT: Manual therapy, gait training, neuromuscular re-education, therapeutic exercise, home exercise instruction, and modalities as needed to include:  moist heat, electrical stimulation, ultrasound, and ice.     Plan  Therapy options: will be seen for skilled physical therapy services  Planned modality interventions: cryotherapy, dry needling, electrical stimulation/Gambian stimulation, ultrasound and hydrotherapy  Planned therapy  interventions: flexibility, functional ROM exercises, home exercise program, joint mobilization, manual therapy, neuromuscular re-education, soft tissue mobilization, spinal/joint mobilization, strengthening, stretching, balance/weight-bearing training, gait training and therapeutic activities  Frequency: 3x week  Duration in weeks: 12  Treatment plan discussed with: patient        History # of Personal Factors and/or Comorbidities: LOW (0)  Examination of Body System(s): # of elements: LOW (1-2)  Clinical Presentation: STABLE   Clinical Decision Making: LOW       Timed:         Manual Therapy:         mins  39324;     Therapeutic Exercise:    8     mins  27938;     Neuromuscular Alesia:        mins  05142;    Therapeutic Activity:          mins  44310;     Gait Training:           mins  97531;     Ultrasound:          mins  63901;    Ionto                                   mins   16070  Self Care                            mins   06300  Canalith Repos         mins 83404      Un-Timed:  Electrical Stimulation:         mins  98945 ( );  Dry Needling          mins self-pay  Traction          mins 93262  Low Eval     22     Mins  58113  Mod Eval          Mins  46143  High Eval                            Mins  98324  Re-Eval                               mins  86471        Timed Treatment:   8   mins   Total Treatment:     30   mins    PT SIGNATURE: Alma Delia Robert PT   DATE TREATMENT INITIATED: 6/24/2021    Initial Certification  Certification Period: 9/22/2021  I certify that the therapy services are furnished while this patient is under my care.  The services outlined above are required by this patient, and will be reviewed every 90 days.     PHYSICIAN: Codie Morales MD      DATE:     Please sign and return via fax to [phone].Thank you, [facility].

## 2021-06-28 ENCOUNTER — TELEPHONE (OUTPATIENT)
Dept: PHYSICAL THERAPY | Facility: CLINIC | Age: 46
End: 2021-06-28

## 2021-06-28 ENCOUNTER — TREATMENT (OUTPATIENT)
Dept: PHYSICAL THERAPY | Facility: CLINIC | Age: 46
End: 2021-06-28

## 2021-06-28 DIAGNOSIS — R29.898 WEAKNESS OF BOTH HIPS: ICD-10-CM

## 2021-06-28 DIAGNOSIS — M25.562 ACUTE PAIN OF LEFT KNEE: Primary | ICD-10-CM

## 2021-06-28 PROCEDURE — 97110 THERAPEUTIC EXERCISES: CPT | Performed by: PHYSICAL THERAPIST

## 2021-06-28 NOTE — PROGRESS NOTES
"Physical Therapy Daily Treatment Note      Patient: Katy Denise   : 1975  Referring practitioner: Codie Morales MD  Date of Initial Visit: Type: THERAPY  Noted: 2021  Today's Date: 2021  Patient seen for 2 sessions         Katy Denise reports:left knee pain 1/10 today    Subjective Evaluation    History of Present Illness    Subjective comment: Patient reports right hip soreness with \"monster\" walk home exercise       Objective   See Exercise, Manual, and Modality Logs for complete treatment.       Assessment & Plan     Assessment  Prognosis: good  Prognosis details: Patient tolerated the progression of strengthening exercises today; only c/o discomfort with eccentric step downs.    Plan  Therapy options: will be seen for skilled physical therapy services        Visit Diagnoses:    ICD-10-CM ICD-9-CM   1. Acute pain of left knee  M25.562 719.46   2. Weakness of both hips  R29.898 729.89       Progress per Plan of Care           Timed:  Manual Therapy:         mins  58035;  Therapeutic Exercise:    25     mins  55265;     Neuromuscular Alesia:        mins  34158;    Therapeutic Activity:          mins  30660;     Gait Training:           mins  98745;     Ultrasound:          mins  96731;    Electrical Stimulation:         mins  60799 ( );    Untimed:  Electrical Stimulation:         mins  10334 ( );  Mechanical Traction:         mins  56031;     Timed Treatment:   25   mins   Total Treatment:     25   mins  Caitlin Toro PT  Physical Therapist  "

## 2021-07-02 ENCOUNTER — TREATMENT (OUTPATIENT)
Dept: PHYSICAL THERAPY | Facility: CLINIC | Age: 46
End: 2021-07-02

## 2021-07-02 DIAGNOSIS — R29.898 WEAKNESS OF BOTH HIPS: ICD-10-CM

## 2021-07-02 DIAGNOSIS — M25.562 ACUTE PAIN OF LEFT KNEE: Primary | ICD-10-CM

## 2021-07-02 PROCEDURE — 97110 THERAPEUTIC EXERCISES: CPT | Performed by: PHYSICAL THERAPIST

## 2021-07-02 NOTE — PROGRESS NOTES
Physical Therapy Daily Progress Note        Patient: Katy Denise   : 1975  Diagnosis/ICD-10 Code:  Acute pain of left knee [M25.562]  Referring practitioner: Codie Morales MD  Date of Initial Visit: Type: THERAPY  Noted: 2021  Today's Date: 2021  Patient seen for 3 sessions             Subjective   Katy Denise reports: 2/10 pain in L knee, states that it is better compared to when it first started hurting. Pt states that a few of the exercises bother her.     Objective   No complaints of increased pain or discomfort.     See Exercise, Manual, and Modality Logs for complete treatment.       Assessment/Plan  Katy progressing as evident by decreased overall knee pain. Pt tolerated exercises well, no complaints of increased pain or discomfort. Pt would benefit from skilled PT to address Range of Motion  and Strength deficits, pain management and any concerns with ADLs.       Progress per Plan of Care           Timed:  Manual Therapy:         mins  89188;  Therapeutic Exercise:    30     mins  22153;     Neuromuscular Alesia:        mins  76453;    Therapeutic Activity:          mins  74330;     Gait Training:           mins  50202;       Untimed:  Electrical Stimulation:         mins  61098 ( );  Mechanical Traction:         mins  60787;       Timed Treatment:   30   mins   Total Treatment:     30   mins        Ana Bhandari PTA  Physical Therapist Assistant

## 2021-07-15 VITALS
SYSTOLIC BLOOD PRESSURE: 135 MMHG | HEART RATE: 62 BPM | HEIGHT: 66 IN | OXYGEN SATURATION: 98 % | DIASTOLIC BLOOD PRESSURE: 60 MMHG | WEIGHT: 214 LBS | BODY MASS INDEX: 34.39 KG/M2

## 2021-07-19 ENCOUNTER — TREATMENT (OUTPATIENT)
Dept: PHYSICAL THERAPY | Facility: CLINIC | Age: 46
End: 2021-07-19

## 2021-07-19 PROCEDURE — 97110 THERAPEUTIC EXERCISES: CPT | Performed by: PHYSICAL THERAPIST

## 2021-07-19 NOTE — PROGRESS NOTES
Physical Therapy Daily Treatment Note      Patient: Katy Denise   : 1975  Referring practitioner: Codie Morales MD  Date of Initial Visit: Type: THERAPY  Noted: 2021  Today's Date: 2021  Patient seen for 4 sessions         Katy Denise reports: left knee pain 4/10.    Subjective Evaluation    History of Present Illness    Subjective comment: Patient reports had increase in left knee pain secondary to traveling out of state (increase walking/sitting)       Objective   See Exercise, Manual, and Modality Logs for complete treatment.       Assessment & Plan     Assessment  Prognosis details: Patient reports less stiffness after exercises today.        Visit Diagnoses:  No diagnosis found.    Progress per Plan of Care           Timed:  Manual Therapy:         mins  83838;  Therapeutic Exercise:    28     mins  63603;     Neuromuscular Alesia:        mins  57334;    Therapeutic Activity:          mins  70366;     Gait Training:           mins  29322;     Ultrasound:          mins  76643;    Electrical Stimulation:         mins  69423 ( );    Untimed:  Electrical Stimulation:         mins  02487 ( );  Mechanical Traction:         mins  73365;     Timed Treatment:   28   mins   Total Treatment:     28   mins  Caitlin Toro PT  Physical Therapist

## 2021-07-23 ENCOUNTER — TREATMENT (OUTPATIENT)
Dept: PHYSICAL THERAPY | Facility: CLINIC | Age: 46
End: 2021-07-23

## 2021-07-23 DIAGNOSIS — R29.898 WEAKNESS OF BOTH HIPS: ICD-10-CM

## 2021-07-23 DIAGNOSIS — M25.562 ACUTE PAIN OF LEFT KNEE: Primary | ICD-10-CM

## 2021-07-23 PROCEDURE — 97110 THERAPEUTIC EXERCISES: CPT | Performed by: PHYSICAL THERAPIST

## 2021-07-23 NOTE — PROGRESS NOTES
Physical Therapy Daily Treatment Note      Patient: Katy Denise   : 1975  Referring practitioner: Codie Morales MD  Date of Initial Visit: Type: THERAPY  Noted: 2021  Today's Date: 2021  Patient seen for 5 sessions           Subjective   Katy Denise reports: left knee pain 3/10.    Objective   See Exercise, Manual, and Modality Logs for complete treatment.       Assessment & Plan     Assessment  Prognosis details: Patient states going to [place] and will be walking a lot. If knee pain increases will call clinic to schedule more appts.or if doing well will call for discharge from therapy.        Visit Diagnoses:    ICD-10-CM ICD-9-CM   1. Acute pain of left knee  M25.562 719.46   2. Weakness of both hips  R29.898 729.89       Progress per Plan of Care           Timed:  Manual Therapy:         mins  98926;  Therapeutic Exercise:    28     mins  57236;     Neuromuscular Alesia:        mins  58818;    Therapeutic Activity:          mins  39161;     Gait Training:           mins  37724;     Ultrasound:          mins  19192;    Electrical Stimulation:         mins  98553 ( );    Untimed:  Electrical Stimulation:         mins  21175 ( );  Mechanical Traction:         mins  51108;     Timed Treatment:   28   mins   Total Treatment:     28   mins  Caitlin Toro PT  Physical Therapist

## 2021-08-02 ENCOUNTER — LAB (OUTPATIENT)
Dept: LAB | Facility: HOSPITAL | Age: 46
End: 2021-08-02

## 2021-08-02 ENCOUNTER — OFFICE VISIT (OUTPATIENT)
Dept: FAMILY MEDICINE CLINIC | Facility: CLINIC | Age: 46
End: 2021-08-02

## 2021-08-02 VITALS
DIASTOLIC BLOOD PRESSURE: 58 MMHG | WEIGHT: 208 LBS | HEART RATE: 77 BPM | BODY MASS INDEX: 33.43 KG/M2 | SYSTOLIC BLOOD PRESSURE: 121 MMHG | OXYGEN SATURATION: 97 % | HEIGHT: 66 IN

## 2021-08-02 DIAGNOSIS — E11.649 TYPE 2 DIABETES MELLITUS WITH HYPOGLYCEMIA WITHOUT COMA, WITHOUT LONG-TERM CURRENT USE OF INSULIN (HCC): Primary | ICD-10-CM

## 2021-08-02 DIAGNOSIS — I10 ESSENTIAL HYPERTENSION: ICD-10-CM

## 2021-08-02 DIAGNOSIS — E78.5 HYPERLIPIDEMIA, UNSPECIFIED HYPERLIPIDEMIA TYPE: ICD-10-CM

## 2021-08-02 DIAGNOSIS — E11.69 TYPE 2 DIABETES MELLITUS WITH OTHER SPECIFIED COMPLICATION, WITHOUT LONG-TERM CURRENT USE OF INSULIN (HCC): ICD-10-CM

## 2021-08-02 LAB — HBA1C MFR BLD: 6.9 % (ref 4.8–5.6)

## 2021-08-02 PROCEDURE — 99214 OFFICE O/P EST MOD 30 MIN: CPT | Performed by: NURSE PRACTITIONER

## 2021-08-02 PROCEDURE — 36415 COLL VENOUS BLD VENIPUNCTURE: CPT

## 2021-08-02 PROCEDURE — 83036 HEMOGLOBIN GLYCOSYLATED A1C: CPT

## 2021-08-02 NOTE — PROGRESS NOTES
Chief Complaint  Diabetes    Subjective            Katy Denise presents to [facility]  Pt is here to f/u for DM type 2, HTN and Hyperlipideama.  She is doing well on all her meds and does not need any refills.  No issues or concerns at this time. Pt had her A1C this morning.    Pt is due DM foot exam.    Pt would like her next f/u to be her CPE.         PMH  Past Medical History:   Diagnosis Date   • Allergic rhinitis due to allergen    • Anxiety disorder    • Arthritis    • Diabetes mellitus, type 2 (CMS/HCC)    • Essential hypertension    • GERD (gastroesophageal reflux disease)    • Hyperlipidemia    • Migraine        ALLERGY  No Known Allergies     SURGICALHX  Past Surgical History:   Procedure Laterality Date   • CARPAL TUNNEL RELEASE Bilateral    •  SECTION     • HYSTERECTOMY     • LUMBAR SPINE SURGERY      Microdiscectomy of thoracic or lumbar spine   • TUBAL ABDOMINAL LIGATION          SOCX  Social History     Tobacco Use   • Smoking status: Former Smoker     Packs/day: 0.25   • Smokeless tobacco: Never Used   • Tobacco comment: off and on for 30 years; .23ppd   Substance Use Topics   • Alcohol use: Yes     Comment: current some day; 2/week       FAMHX  Family History   Problem Relation Age of Onset   • Stroke Other    • Heart disease Other    • Liver disease Other         cirrhosis   • Diabetes Other         MEDSIGONLY  Current Outpatient Medications on File Prior to Visit   Medication Sig   • diphenhydrAMINE (Benadryl Allergy) 25 mg capsule Benadryl 25 mg oral capsule take 2 capsules (50 mg) by oral route once daily at bedtime as needed   Active   • EPINEPHrine (EpiPen 2-Regino) 0.3 MG/0.3ML solution auto-injector injection 0.3 mL.   • escitalopram (LEXAPRO) 20 MG tablet Take 1 tablet by mouth Daily.   • hyoscyamine (ANASPAZ,LEVSIN) 0.125 MG tablet hyoscyamine sulfate 0.125 mg oral tablet take 1 tablet (0.125 mg) by oral route 3 times per day   Active   • losartan  "(COZAAR) 25 MG tablet    • metFORMIN (GLUCOPHAGE) 1000 MG tablet    • propranolol LA (INDERAL LA) 80 MG 24 hr capsule    • rosuvastatin (CRESTOR) 5 MG tablet    • Xigduo XR  MG tablet    • [DISCONTINUED] diclofenac (VOLTAREN) 75 MG EC tablet Take 1 tablet by mouth 2 (Two) Times a Day.   • [DISCONTINUED] Melatonin 10 MG capsule melatonin 10 mg oral capsule take 1 capsule by oral route once a day (at bedtime)   Active     No current facility-administered medications on file prior to visit.       Health Maintenance Due   Topic Date Due   • ANNUAL PHYSICAL  Never done   • Pneumococcal Vaccine 0-64 (1 of 2 - PPSV23) Never done   • COVID-19 Vaccine (1) Never done   • Hepatitis B (1 of 3 - Risk 3-dose series) Never done   • TDAP/TD VACCINES (1 - Tdap) Never done   • HEPATITIS C SCREENING  Never done   • DIABETIC FOOT EXAM  Never done       Objective     /58   Pulse 77   Ht 167.6 cm (66\")   Wt 94.3 kg (208 lb)   SpO2 97%   BMI 33.57 kg/m²       Physical Exam  Constitutional:       General: She is not in acute distress.     Appearance: Normal appearance. She is not ill-appearing.   HENT:      Head: Normocephalic and atraumatic.      Mouth/Throat:      Pharynx: No oropharyngeal exudate or posterior oropharyngeal erythema.   Cardiovascular:      Rate and Rhythm: Normal rate and regular rhythm.      Pulses: Normal pulses.           Dorsalis pedis pulses are 2+ on the right side and 2+ on the left side.      Heart sounds: Normal heart sounds. No murmur heard.     Pulmonary:      Effort: Pulmonary effort is normal. No respiratory distress.      Breath sounds: Normal breath sounds.   Chest:      Chest wall: No tenderness.   Abdominal:      General: Abdomen is flat. Bowel sounds are normal. There is no distension.      Palpations: Abdomen is soft. There is no mass.      Tenderness: There is no abdominal tenderness. There is no guarding.   Musculoskeletal:         General: No swelling or tenderness. Normal range of " motion.      Cervical back: Normal range of motion and neck supple.   Feet:      Right foot:      Protective Sensation: 3 sites tested. 3 sites sensed.      Skin integrity: Skin integrity normal. No ulcer or blister.      Toenail Condition: Right toenails are normal.      Left foot:      Protective Sensation: 3 sites tested. 3 sites sensed.      Skin integrity: Skin integrity normal. No ulcer or blister.      Toenail Condition: Left toenails are normal.      Comments:      Skin:     General: Skin is warm and dry.      Findings: No rash.   Neurological:      General: No focal deficit present.      Mental Status: She is alert and oriented to person, place, and time. Mental status is at baseline.      Gait: Gait normal.   Psychiatric:         Mood and Affect: Mood normal.         Behavior: Behavior normal.         Thought Content: Thought content normal.         Judgment: Judgment normal.           Result Review :                           Assessment and Plan        Diagnoses and all orders for this visit:    1. Type 2 diabetes mellitus with hypoglycemia without coma, without long-term current use of insulin (CMS/[facility]) (Primary)  Comments:  A1c is pending will call pt with results and any further instructions    2. Hyperlipidemia, unspecified hyperlipidemia type  Assessment & Plan:  Stable on current meds      3. Essential hypertension  Comments:  stable on current meds            Follow Up     Return in about 3 months (around 11/2/2021).    Patient was given instructions and counseling regarding her condition or for health maintenance advice. Please see specific information pulled into the AVS if appropriate.     Katy Denise  reports that she has quit smoking. She smoked 0.25 packs per day. She has never used smokeless tobacco.

## 2021-08-03 ENCOUNTER — TELEPHONE (OUTPATIENT)
Dept: FAMILY MEDICINE CLINIC | Facility: CLINIC | Age: 46
End: 2021-08-03

## 2021-08-03 NOTE — TELEPHONE ENCOUNTER
----- Message from DELIO Gutierrez sent at 8/3/2021  6:22 AM EDT -----  Much improved from 3 months ago, continue medication and diabetic diet and exercise repeat in 3 months

## 2021-08-25 DIAGNOSIS — I10 ESSENTIAL HYPERTENSION: Primary | ICD-10-CM

## 2021-08-25 DIAGNOSIS — E78.2 MIXED HYPERLIPIDEMIA: ICD-10-CM

## 2021-08-25 NOTE — TELEPHONE ENCOUNTER
Caller: [id number] - [place], [place] - [address] AT [facility] [phone] [facility] [phone] FX    Relationship: Pharmacy    Best call back number: [phone]     Medication needed:   Requested Prescriptions     Pending Prescriptions Disp Refills   • propranolol LA (INDERAL LA) 80 MG 24 hr capsule     • losartan (COZAAR) 25 MG tablet     • rosuvastatin (CRESTOR) 5 MG tablet         When do you need the refill by: 08/25/21     What additional details did the patient provide when requesting the medication: PATIENT IS NEEDING A REFILL. PLEASE CALL AND ADVISE.     Does the patient have less than a 3 day supply:  [x] Yes  [] No    What is the patient's preferred pharmacy: [id number] - [place], [place] - [address] AT [facility] [phone] [facility] [phone] FX

## 2021-08-26 RX ORDER — ROSUVASTATIN CALCIUM 5 MG/1
5 TABLET, COATED ORAL NIGHTLY
Qty: 90 TABLET | Refills: 1 | Status: SHIPPED | OUTPATIENT
Start: 2021-08-26 | End: 2022-02-22

## 2021-08-26 RX ORDER — PROPRANOLOL HYDROCHLORIDE 80 MG/1
80 CAPSULE, EXTENDED RELEASE ORAL DAILY
Qty: 90 CAPSULE | Refills: 1 | Status: SHIPPED | OUTPATIENT
Start: 2021-08-26 | End: 2022-02-22

## 2021-08-26 RX ORDER — LOSARTAN POTASSIUM 25 MG/1
25 TABLET ORAL DAILY
Qty: 90 TABLET | Refills: 1 | Status: SHIPPED | OUTPATIENT
Start: 2021-08-26 | End: 2022-02-22

## 2021-09-29 ENCOUNTER — DOCUMENTATION (OUTPATIENT)
Dept: PHYSICAL THERAPY | Facility: CLINIC | Age: 46
End: 2021-09-29

## 2021-09-29 NOTE — PROGRESS NOTES
Discharge Summary  Discharge Summary from Physical Therapy Report    Patient Information  Katy Denise  1975    Pt had improvements in her pain with therapy.  Pt did not call to schedule more PT follow ups after her last visit.  Due to unexpected discharge, goals and objective measurements were not assessed.  Pt will be discharged from PT.      Alma Delia Robert, PT  Physical Therapist

## 2021-11-02 ENCOUNTER — LAB (OUTPATIENT)
Dept: LAB | Facility: HOSPITAL | Age: 46
End: 2021-11-02

## 2021-11-02 ENCOUNTER — OFFICE VISIT (OUTPATIENT)
Dept: FAMILY MEDICINE CLINIC | Facility: CLINIC | Age: 46
End: 2021-11-02

## 2021-11-02 VITALS
HEIGHT: 66 IN | BODY MASS INDEX: 33.75 KG/M2 | WEIGHT: 210 LBS | SYSTOLIC BLOOD PRESSURE: 113 MMHG | HEART RATE: 68 BPM | OXYGEN SATURATION: 97 % | DIASTOLIC BLOOD PRESSURE: 48 MMHG

## 2021-11-02 DIAGNOSIS — F41.9 ANXIETY DISORDER, UNSPECIFIED TYPE: ICD-10-CM

## 2021-11-02 DIAGNOSIS — Z00.00 ANNUAL PHYSICAL EXAM: Primary | ICD-10-CM

## 2021-11-02 DIAGNOSIS — E11.69 TYPE 2 DIABETES MELLITUS WITH OTHER SPECIFIED COMPLICATION, WITHOUT LONG-TERM CURRENT USE OF INSULIN (HCC): ICD-10-CM

## 2021-11-02 DIAGNOSIS — Z12.31 ENCOUNTER FOR SCREENING MAMMOGRAM FOR MALIGNANT NEOPLASM OF BREAST: ICD-10-CM

## 2021-11-02 DIAGNOSIS — E78.5 HYPERLIPIDEMIA, UNSPECIFIED HYPERLIPIDEMIA TYPE: ICD-10-CM

## 2021-11-02 LAB
ALBUMIN UR-MCNC: <1.2 MG/DL
HBA1C MFR BLD: 7.1 % (ref 4.8–5.6)

## 2021-11-02 PROCEDURE — 99396 PREV VISIT EST AGE 40-64: CPT | Performed by: NURSE PRACTITIONER

## 2021-11-02 PROCEDURE — 83036 HEMOGLOBIN GLYCOSYLATED A1C: CPT

## 2021-11-02 PROCEDURE — 82043 UR ALBUMIN QUANTITATIVE: CPT

## 2021-11-02 PROCEDURE — 36415 COLL VENOUS BLD VENIPUNCTURE: CPT

## 2021-11-02 RX ORDER — DAPAGLIFLOZIN AND METFORMIN HYDROCHLORIDE 10; 1000 MG/1; MG/1
1 TABLET, FILM COATED, EXTENDED RELEASE ORAL
Qty: 90 TABLET | Refills: 1 | Status: SHIPPED | OUTPATIENT
Start: 2021-11-02 | End: 2022-05-17

## 2021-11-02 RX ORDER — DICLOFENAC SODIUM 75 MG/1
75 TABLET, DELAYED RELEASE ORAL 2 TIMES DAILY
COMMUNITY
Start: 2021-08-26 | End: 2022-03-03

## 2021-11-02 NOTE — PROGRESS NOTES
Chief Complaint  Annual Exam    Adrianne Denise presents to [facility]  Pt is here for an annual CPE. No issues or concerns at this time.    Pt would like refills sent to [facility].    Pt is due A1C and micralbumin labs.     Pt requests an order for a mammogram.        PMH  Past Medical History:   Diagnosis Date   • Allergic rhinitis due to allergen    • Anxiety disorder    • Arthritis    • Diabetes mellitus, type 2 (HCC)    • Essential hypertension    • GERD (gastroesophageal reflux disease)    • Hyperlipidemia    • Migraine        ALLERGY  No Known Allergies     SURGICALHX  Past Surgical History:   Procedure Laterality Date   • CARPAL TUNNEL RELEASE Bilateral    •  SECTION     • HYSTERECTOMY     • LUMBAR SPINE SURGERY      Microdiscectomy of thoracic or lumbar spine   • TUBAL ABDOMINAL LIGATION          SOCX  Social History     Tobacco Use   • Smoking status: Former Smoker     Packs/day: 0.25   • Smokeless tobacco: Never Used   • Tobacco comment: off and on for 30 years; .23ppd   Substance Use Topics   • Alcohol use: Yes     Comment: current some day; 2/week       FAMHX  Family History   Problem Relation Age of Onset   • Stroke Other    • Heart disease Other    • Liver disease Other         cirrhosis   • Diabetes Other         MEDSIGONLY  Current Outpatient Medications on File Prior to Visit   Medication Sig   • diphenhydrAMINE (Benadryl Allergy) 25 mg capsule Benadryl 25 mg oral capsule take 2 capsules (50 mg) by oral route once daily at bedtime as needed   Active   • EPINEPHrine (EpiPen 2-Regino) 0.3 MG/0.3ML solution auto-injector injection 0.3 mL.   • escitalopram (LEXAPRO) 20 MG tablet Take 1 tablet by mouth Daily.   • hyoscyamine (ANASPAZ,LEVSIN) 0.125 MG tablet hyoscyamine sulfate 0.125 mg oral tablet take 1 tablet (0.125 mg) by oral route 3 times per day   Active   • losartan (COZAAR) 25 MG tablet Take 1 tablet by mouth Daily.   • propranolol LA  "(INDERAL LA) 80 MG 24 hr capsule Take 1 capsule by mouth Daily.   • rosuvastatin (CRESTOR) 5 MG tablet Take 1 tablet by mouth Every Night.   • [DISCONTINUED] metFORMIN (GLUCOPHAGE) 1000 MG tablet    • [DISCONTINUED] Xigduo XR  MG tablet    • diclofenac (VOLTAREN) 75 MG EC tablet Take 75 mg by mouth 2 (Two) Times a Day.     No current facility-administered medications on file prior to visit.       Health Maintenance Due   Topic Date Due   • ANNUAL PHYSICAL  Never done   • Pneumococcal Vaccine 0-64 (1 of 2 - PPSV23) Never done   • Hepatitis B (1 of 3 - Risk 3-dose series) Never done   • TDAP/TD VACCINES (1 - Tdap) Never done   • HEPATITIS C SCREENING  Never done       Objective     /48   Pulse 68   Ht 167.6 cm (66\")   Wt 95.3 kg (210 lb)   SpO2 97%   BMI 33.89 kg/m²       Physical Exam  Constitutional:       General: She is not in acute distress.     Appearance: Normal appearance. She is not ill-appearing.   HENT:      Head: Normocephalic and atraumatic.      Mouth/Throat:      Pharynx: No oropharyngeal exudate or posterior oropharyngeal erythema.   Cardiovascular:      Rate and Rhythm: Normal rate and regular rhythm.      Heart sounds: Normal heart sounds. No murmur heard.      Pulmonary:      Effort: Pulmonary effort is normal. No respiratory distress.      Breath sounds: Normal breath sounds.   Chest:      Chest wall: No tenderness.   Abdominal:      General: Abdomen is flat. Bowel sounds are normal. There is no distension.      Palpations: Abdomen is soft. There is no mass.      Tenderness: There is no abdominal tenderness. There is no guarding.   Musculoskeletal:         General: No swelling or tenderness. Normal range of motion.      Cervical back: Normal range of motion and neck supple.   Skin:     General: Skin is warm and dry.      Findings: No rash.   Neurological:      General: No focal deficit present.      Mental Status: She is alert and oriented to person, place, and time. Mental status " is at baseline.      Gait: Gait normal.   Psychiatric:         Mood and Affect: Mood normal.         Behavior: Behavior normal.         Thought Content: Thought content normal.         Judgment: Judgment normal.           Result Review :                           Assessment and Plan        Diagnoses and all orders for this visit:    1. Annual physical exam (Primary)    2. Type 2 diabetes mellitus with other specified complication, without long-term current use of insulin (HCC)  Comments:  stable on xigduo 10/1000 and metformin 1000mg  Orders:  -     metFORMIN (GLUCOPHAGE) 1000 MG tablet; Take 1 tablet by mouth Daily With Breakfast.  Dispense: 90 tablet; Refill: 1  -     Xigduo XR  MG tablet; Take 1 tablet by mouth every night at bedtime.  Dispense: 90 tablet; Refill: 1  -     Hemoglobin A1c; Future  -     MicroAlbumin, Urine, Random - Urine, Clean Catch; Future    3. Anxiety disorder, unspecified type  Comments:  stable on lexapro 20mg    4. Hyperlipidemia, unspecified hyperlipidemia type  Comments:  stable on crestor 5mg    5. Encounter for screening mammogram for malignant neoplasm of breast  -     Mammo Screening Bilateral With CAD; Future      Preventative counseling:    Avoid alcohol and illegal drugs  Healthy diet and daily exercise  Get adequate sleep        Follow Up     Return in about 6 months (around 5/2/2022).    Patient was given instructions and counseling regarding her condition or for health maintenance advice. Please see specific information pulled into the AVS if appropriate.     Katy Denise  reports that she has quit smoking. She smoked 0.25 packs per day. She has never used smokeless tobacco..

## 2021-11-03 ENCOUNTER — TELEPHONE (OUTPATIENT)
Dept: FAMILY MEDICINE CLINIC | Facility: CLINIC | Age: 46
End: 2021-11-03

## 2021-11-03 NOTE — TELEPHONE ENCOUNTER
----- Message from DELIO Gutierrez sent at 11/3/2021  6:27 AM EDT -----  A1c has gone up take meds as prescribed and encourage diabetic diet and daily exercise recheck in 3 months

## 2022-01-28 ENCOUNTER — HOSPITAL ENCOUNTER (OUTPATIENT)
Dept: MAMMOGRAPHY | Facility: HOSPITAL | Age: 47
Discharge: HOME | End: 2022-01-28
Admitting: NURSE PRACTITIONER

## 2022-01-28 DIAGNOSIS — Z12.31 ENCOUNTER FOR SCREENING MAMMOGRAM FOR MALIGNANT NEOPLASM OF BREAST: ICD-10-CM

## 2022-01-28 PROCEDURE — 77063 BREAST TOMOSYNTHESIS BI: CPT

## 2022-01-28 PROCEDURE — 77067 SCR MAMMO BI INCL CAD: CPT

## 2022-02-02 ENCOUNTER — TELEPHONE (OUTPATIENT)
Dept: FAMILY MEDICINE CLINIC | Facility: CLINIC | Age: 47
End: 2022-02-02

## 2022-02-02 NOTE — TELEPHONE ENCOUNTER
----- Message from DELIO Gutierrez sent at 2/2/2022  8:42 AM EST -----  Normal mammo repeat in 1 year

## 2022-02-06 DIAGNOSIS — F33.0 MILD EPISODE OF RECURRENT MAJOR DEPRESSIVE DISORDER: ICD-10-CM

## 2022-02-06 DIAGNOSIS — F41.9 ANXIETY DISORDER, UNSPECIFIED TYPE: ICD-10-CM

## 2022-02-07 RX ORDER — ESCITALOPRAM OXALATE 20 MG/1
20 TABLET ORAL DAILY
Qty: 90 TABLET | Refills: 1 | Status: SHIPPED | OUTPATIENT
Start: 2022-02-07 | End: 2022-07-25

## 2022-02-22 DIAGNOSIS — I10 ESSENTIAL HYPERTENSION: ICD-10-CM

## 2022-02-22 DIAGNOSIS — E78.2 MIXED HYPERLIPIDEMIA: ICD-10-CM

## 2022-02-22 RX ORDER — PROPRANOLOL HYDROCHLORIDE 80 MG/1
80 CAPSULE, EXTENDED RELEASE ORAL DAILY
Qty: 90 CAPSULE | Refills: 1 | Status: SHIPPED | OUTPATIENT
Start: 2022-02-22 | End: 2022-08-10

## 2022-02-22 RX ORDER — LOSARTAN POTASSIUM 25 MG/1
25 TABLET ORAL DAILY
Qty: 90 TABLET | Refills: 1 | Status: SHIPPED | OUTPATIENT
Start: 2022-02-22 | End: 2022-08-10

## 2022-02-22 RX ORDER — ROSUVASTATIN CALCIUM 5 MG/1
5 TABLET, COATED ORAL NIGHTLY
Qty: 90 TABLET | Refills: 1 | Status: SHIPPED | OUTPATIENT
Start: 2022-02-22 | End: 2022-05-17 | Stop reason: SDUPTHER

## 2022-03-03 ENCOUNTER — OFFICE VISIT (OUTPATIENT)
Dept: FAMILY MEDICINE CLINIC | Facility: CLINIC | Age: 47
End: 2022-03-03

## 2022-03-03 VITALS
OXYGEN SATURATION: 100 % | SYSTOLIC BLOOD PRESSURE: 114 MMHG | DIASTOLIC BLOOD PRESSURE: 60 MMHG | WEIGHT: 210 LBS | HEIGHT: 66 IN | HEART RATE: 64 BPM | BODY MASS INDEX: 33.75 KG/M2

## 2022-03-03 DIAGNOSIS — R05.9 COUGH: ICD-10-CM

## 2022-03-03 DIAGNOSIS — J32.9 SINUSITIS, UNSPECIFIED CHRONICITY, UNSPECIFIED LOCATION: Primary | ICD-10-CM

## 2022-03-03 DIAGNOSIS — J34.89 SINUS DRAINAGE: ICD-10-CM

## 2022-03-03 LAB — SARS-COV-2 RNA PNL SPEC NAA+PROBE: NOT DETECTED

## 2022-03-03 PROCEDURE — 99213 OFFICE O/P EST LOW 20 MIN: CPT | Performed by: NURSE PRACTITIONER

## 2022-03-03 PROCEDURE — U0004 COV-19 TEST NON-CDC HGH THRU: HCPCS | Performed by: NURSE PRACTITIONER

## 2022-03-03 RX ORDER — AZITHROMYCIN 250 MG/1
TABLET, FILM COATED ORAL
Qty: 6 TABLET | Refills: 0 | Status: SHIPPED | OUTPATIENT
Start: 2022-03-03 | End: 2022-05-17

## 2022-03-03 NOTE — PROGRESS NOTES
Chief Complaint  Sinus Problem, Cough, and Headache    Subjective            Katy Denise presents to [facility]  Pt here today c/o sinus headaches, drainage and cough since     Pt denies any fever.     Pt would like covid test, declines strep and flu.  She works for the school system.        Past Medical History:   Diagnosis Date   • Allergic rhinitis due to allergen    • Anxiety disorder    • Arthritis    • Diabetes mellitus, type 2 (HCC)    • Essential hypertension    • GERD (gastroesophageal reflux disease)    • Hyperlipidemia    • Migraine        No Known Allergies     Past Surgical History:   Procedure Laterality Date   • CARPAL TUNNEL RELEASE Bilateral    •  SECTION     • HYSTERECTOMY     • LUMBAR SPINE SURGERY      Microdiscectomy of thoracic or lumbar spine   • TUBAL ABDOMINAL LIGATION          Social History     Tobacco Use   • Smoking status: Former Smoker     Packs/day: 0.25   • Smokeless tobacco: Never Used   • Tobacco comment: off and on for 30 years; .23ppd   Substance Use Topics   • Alcohol use: Yes     Comment: current some day; 2/week       Family History   Problem Relation Age of Onset   • Stroke Other    • Heart disease Other    • Liver disease Other         cirrhosis   • Diabetes Other         Current Outpatient Medications on File Prior to Visit   Medication Sig   • diphenhydrAMINE (Benadryl Allergy) 25 mg capsule Benadryl 25 mg oral capsule take 2 capsules (50 mg) by oral route once daily at bedtime as needed   Active   • EPINEPHrine (EpiPen 2-Regino) 0.3 MG/0.3ML solution auto-injector injection 0.3 mL.   • escitalopram (LEXAPRO) 20 MG tablet TAKE 1 TABLET BY MOUTH DAILY   • hyoscyamine (ANASPAZ,LEVSIN) 0.125 MG tablet hyoscyamine sulfate 0.125 mg oral tablet take 1 tablet (0.125 mg) by oral route 3 times per day   Active   • losartan (COZAAR) 25 MG tablet TAKE 1 TABLET BY MOUTH DAILY   • metFORMIN (GLUCOPHAGE) 1000 MG tablet Take 1 tablet by  "mouth Daily With Breakfast.   • propranolol LA (INDERAL LA) 80 MG 24 hr capsule TAKE 1 CAPSULE BY MOUTH DAILY   • rosuvastatin (CRESTOR) 5 MG tablet TAKE 1 TABLET BY MOUTH EVERY NIGHT   • Xigduo XR  MG tablet Take 1 tablet by mouth every night at bedtime.   • [DISCONTINUED] diclofenac (VOLTAREN) 75 MG EC tablet Take 75 mg by mouth 2 (Two) Times a Day.     No current facility-administered medications on file prior to visit.       Health Maintenance Due   Topic Date Due   • COLORECTAL CANCER SCREENING  Never done   • Pneumococcal Vaccine 0-64 (1 of 2 - PPSV23) Never done   • Hepatitis B (1 of 3 - Risk 3-dose series) Never done   • TDAP/TD VACCINES (1 - Tdap) Never done   • HEPATITIS C SCREENING  Never done   • COVID-19 Vaccine (3 - Booster for Pfizer series) 02/03/2022       Objective     /60   Pulse 64   Ht 167.6 cm (66\")   Wt 95.3 kg (210 lb)   SpO2 100%   BMI 33.89 kg/m²       Physical Exam  Constitutional:       General: She is not in acute distress.     Appearance: Normal appearance. She is not ill-appearing.   HENT:      Head: Normocephalic and atraumatic.      Right Ear: Tympanic membrane, ear canal and external ear normal.      Left Ear: Tympanic membrane, ear canal and external ear normal.      Nose: Nose normal.      Mouth/Throat:      Mouth: Mucous membranes are moist.      Pharynx: Oropharynx is clear. Posterior oropharyngeal erythema present. No pharyngeal swelling or oropharyngeal exudate.      Tonsils: No tonsillar exudate.   Cardiovascular:      Rate and Rhythm: Normal rate and regular rhythm.      Heart sounds: Normal heart sounds. No murmur heard.      Pulmonary:      Effort: Pulmonary effort is normal. No respiratory distress.      Breath sounds: Normal breath sounds.   Chest:      Chest wall: No tenderness.   Abdominal:      General: There is no distension.      Palpations: There is no mass.      Tenderness: There is no abdominal tenderness. There is no guarding. "   Musculoskeletal:         General: No swelling or tenderness. Normal range of motion.      Cervical back: Normal range of motion and neck supple.   Skin:     General: Skin is warm and dry.      Findings: No rash.   Neurological:      General: No focal deficit present.      Mental Status: She is alert and oriented to person, place, and time. Mental status is at baseline.      Gait: Gait normal.   Psychiatric:         Mood and Affect: Mood normal.         Behavior: Behavior normal.         Thought Content: Thought content normal.         Judgment: Judgment normal.           Result Review :                           Assessment and Plan        Diagnoses and all orders for this visit:    1. Sinusitis, unspecified chronicity, unspecified location (Primary)  -     azithromycin (Zithromax Z-Regino) 250 MG tablet; Take 2 tablets by mouth on day 1, then 1 tablet daily on days 2-5  Dispense: 6 tablet; Refill: 0    2. Sinus drainage  -     COVID-19,APTIMA PANTHER(TEN),[facility], NP/OP SWAB IN UTM/VTM/[facility]INE TRANSPORT MEDIA,24 HR TAT - Swab, Nasopharynx; Future    3. Cough  -     COVID-19,APTIMA PANTHER(TEN),[facility], NP/OP SWAB IN UTM/VTM/SALINE TRANSPORT MEDIA,24 HR TAT - Swab, Nasopharynx; Future              Follow Up     Return if symptoms worsen or fail to improve.    Patient was given instructions and counseling regarding her condition or for health maintenance advice. Please see specific information pulled into the AVS if appropriate.     Katy Denise  reports that she has quit smoking. She smoked 0.25 packs per day. She has never used smokeless tobacco..

## 2022-03-04 ENCOUNTER — TELEPHONE (OUTPATIENT)
Dept: FAMILY MEDICINE CLINIC | Facility: CLINIC | Age: 47
End: 2022-03-04

## 2022-03-08 ENCOUNTER — TELEPHONE (OUTPATIENT)
Dept: FAMILY MEDICINE CLINIC | Facility: CLINIC | Age: 47
End: 2022-03-08

## 2022-03-08 DIAGNOSIS — J32.9 SINUSITIS, UNSPECIFIED CHRONICITY, UNSPECIFIED LOCATION: Primary | ICD-10-CM

## 2022-03-08 RX ORDER — METHYLPREDNISOLONE 4 MG/1
TABLET ORAL
Qty: 21 EACH | Refills: 0 | Status: SHIPPED | OUTPATIENT
Start: 2022-03-08 | End: 2022-05-17

## 2022-05-17 ENCOUNTER — TELEPHONE (OUTPATIENT)
Dept: FAMILY MEDICINE CLINIC | Facility: CLINIC | Age: 47
End: 2022-05-17

## 2022-05-17 ENCOUNTER — OFFICE VISIT (OUTPATIENT)
Dept: FAMILY MEDICINE CLINIC | Facility: CLINIC | Age: 47
End: 2022-05-17

## 2022-05-17 ENCOUNTER — LAB (OUTPATIENT)
Dept: LAB | Facility: HOSPITAL | Age: 47
End: 2022-05-17

## 2022-05-17 VITALS
HEART RATE: 77 BPM | WEIGHT: 204 LBS | OXYGEN SATURATION: 97 % | BODY MASS INDEX: 32.78 KG/M2 | SYSTOLIC BLOOD PRESSURE: 108 MMHG | DIASTOLIC BLOOD PRESSURE: 58 MMHG | HEIGHT: 66 IN

## 2022-05-17 DIAGNOSIS — E11.69 TYPE 2 DIABETES MELLITUS WITH OTHER SPECIFIED COMPLICATION, WITHOUT LONG-TERM CURRENT USE OF INSULIN: Primary | ICD-10-CM

## 2022-05-17 DIAGNOSIS — E11.69 TYPE 2 DIABETES MELLITUS WITH OTHER SPECIFIED COMPLICATION, WITHOUT LONG-TERM CURRENT USE OF INSULIN: ICD-10-CM

## 2022-05-17 DIAGNOSIS — F41.9 ANXIETY DISORDER, UNSPECIFIED TYPE: ICD-10-CM

## 2022-05-17 DIAGNOSIS — Z11.59 NEED FOR HEPATITIS C SCREENING TEST: ICD-10-CM

## 2022-05-17 DIAGNOSIS — E78.2 MIXED HYPERLIPIDEMIA: ICD-10-CM

## 2022-05-17 DIAGNOSIS — Z13.29 SCREENING FOR THYROID DISORDER: ICD-10-CM

## 2022-05-17 DIAGNOSIS — I10 ESSENTIAL HYPERTENSION: ICD-10-CM

## 2022-05-17 DIAGNOSIS — Z12.11 SCREEN FOR COLON CANCER: Primary | ICD-10-CM

## 2022-05-17 LAB — HBA1C MFR BLD: 7.1 % (ref 4.8–5.6)

## 2022-05-17 PROCEDURE — 83036 HEMOGLOBIN GLYCOSYLATED A1C: CPT

## 2022-05-17 PROCEDURE — 36415 COLL VENOUS BLD VENIPUNCTURE: CPT

## 2022-05-17 PROCEDURE — 99214 OFFICE O/P EST MOD 30 MIN: CPT | Performed by: NURSE PRACTITIONER

## 2022-05-17 RX ORDER — ROSUVASTATIN CALCIUM 5 MG/1
5 TABLET, COATED ORAL NIGHTLY
Qty: 90 TABLET | Refills: 1 | Status: SHIPPED | OUTPATIENT
Start: 2022-05-17 | End: 2022-11-08

## 2022-05-17 NOTE — PROGRESS NOTES
Chief Complaint  Depression, Hyperlipidemia, Hypertension, and Diabetes, depression    Subjective            Katy Denise presents to [facility]  Pt is here for a 6 mo f/u for DM, HTN, and HLD and depression. Pt would like to discuss xigduo. Pt has been having vaginal issues/yeast infections since starting this medication. PT would like to discuss other options.  She has done injectables in the past and is open to them.    Pt is due labs. Pt had A1C done today.    Pt is due colon screening. Pt would like referral to Gen Surg.         Past Medical History:   Diagnosis Date   • Allergic rhinitis due to allergen    • Anxiety disorder    • Arthritis    • Diabetes mellitus, type 2 (HCC)    • Essential hypertension    • GERD (gastroesophageal reflux disease)    • Hyperlipidemia    • Migraine        No Known Allergies     Past Surgical History:   Procedure Laterality Date   • CARPAL TUNNEL RELEASE Bilateral    •  SECTION     • HYSTERECTOMY     • LUMBAR SPINE SURGERY      Microdiscectomy of thoracic or lumbar spine   • TUBAL ABDOMINAL LIGATION          Social History     Tobacco Use   • Smoking status: Former Smoker     Packs/day: 0.25   • Smokeless tobacco: Never Used   • Tobacco comment: off and on for 30 years; .23ppd   Substance Use Topics   • Alcohol use: Yes     Comment: current some day; 2/week       Family History   Problem Relation Age of Onset   • Stroke Other    • Heart disease Other    • Liver disease Other         cirrhosis   • Diabetes Other         Current Outpatient Medications on File Prior to Visit   Medication Sig   • diphenhydrAMINE (BENADRYL) 25 mg capsule Benadryl 25 mg oral capsule take 2 capsules (50 mg) by oral route once daily at bedtime as needed   Active   • EPINEPHrine (EPIPEN) 0.3 MG/0.3ML solution auto-injector injection 0.3 mL.   • escitalopram (LEXAPRO) 20 MG tablet TAKE 1 TABLET BY MOUTH DAILY   • hyoscyamine (ANASPAZ,LEVSIN) 0.125 MG tablet  "hyoscyamine sulfate 0.125 mg oral tablet take 1 tablet (0.125 mg) by oral route 3 times per day   Active   • propranolol LA (INDERAL LA) 80 MG 24 hr capsule TAKE 1 CAPSULE BY MOUTH DAILY   • [DISCONTINUED] metFORMIN (GLUCOPHAGE) 1000 MG tablet Take 1 tablet by mouth Daily With Breakfast.   • [DISCONTINUED] rosuvastatin (CRESTOR) 5 MG tablet TAKE 1 TABLET BY MOUTH EVERY NIGHT   • [DISCONTINUED] Xigduo XR  MG tablet Take 1 tablet by mouth every night at bedtime.   • losartan (COZAAR) 25 MG tablet TAKE 1 TABLET BY MOUTH DAILY   • [DISCONTINUED] azithromycin (Zithromax Z-Regino) 250 MG tablet Take 2 tablets by mouth on day 1, then 1 tablet daily on days 2-5   • [DISCONTINUED] methylPREDNISolone (MEDROL) 4 MG dose pack Take as directed on package instructions.     No current facility-administered medications on file prior to visit.       Health Maintenance Due   Topic Date Due   • COLORECTAL CANCER SCREENING  Never done   • Pneumococcal Vaccine 0-64 (1 - PCV) Never done   • TDAP/TD VACCINES (1 - Tdap) Never done   • HEPATITIS C SCREENING  Never done   • LIPID PANEL  04/20/2022   • HEMOGLOBIN A1C  05/02/2022   • DIABETIC EYE EXAM  05/05/2022       Objective     /58   Pulse 77   Ht 167.6 cm (66\")   Wt 92.5 kg (204 lb)   SpO2 97%   BMI 32.93 kg/m²       Physical Exam  Constitutional:       General: She is not in acute distress.     Appearance: Normal appearance. She is not ill-appearing.   HENT:      Head: Normocephalic and atraumatic.   Cardiovascular:      Rate and Rhythm: Normal rate and regular rhythm.      Heart sounds: Normal heart sounds. No murmur heard.  Pulmonary:      Effort: Pulmonary effort is normal. No respiratory distress.      Breath sounds: Normal breath sounds.   Chest:      Chest wall: No tenderness.   Abdominal:      General: There is no distension.      Palpations: There is no mass.      Tenderness: There is no abdominal tenderness. There is no guarding.   Musculoskeletal:         " General: No swelling or tenderness. Normal range of motion.      Cervical back: Normal range of motion and neck supple.   Skin:     General: Skin is warm and dry.      Findings: No rash.   Neurological:      General: No focal deficit present.      Mental Status: She is alert and oriented to person, place, and time. Mental status is at baseline.      Gait: Gait normal.   Psychiatric:         Mood and Affect: Mood normal.         Behavior: Behavior normal.         Thought Content: Thought content normal.         Judgment: Judgment normal.           Result Review :                           Assessment and Plan        Diagnoses and all orders for this visit:    1. Screen for colon cancer (Primary)  -     Amb referral for Screening Colonoscopy    2. Mixed hyperlipidemia  Comments:  stable on crestor 5mg, continue  Orders:  -     Comprehensive metabolic panel; Future  -     Lipid panel; Future  -     rosuvastatin (CRESTOR) 5 MG tablet; Take 1 tablet by mouth Every Night.  Dispense: 90 tablet; Refill: 1    3. Type 2 diabetes mellitus with other specified complication, without long-term current use of insulin (HCC)  -     MicroAlbumin, Urine, Random - Urine, Clean Catch; Future  -     metFORMIN (Glucophage) 1000 MG tablet; Take 1 tablet by mouth 2 (Two) Times a Day With Meals.  Dispense: 180 tablet; Refill: 1  -     Semaglutide,0.25 or 0.5MG/DOS, (OZEMPIC) 2 MG/1.5ML solution pen-injector; Inject 0.25 mg under the skin into the appropriate area as directed 1 (One) Time Per Week.  Dispense: 3 pen; Refill: 2    4. Essential hypertension  Comments:  stable on cozaar 25mg and inderal 80mg, continue  Orders:  -     CBC w AUTO Differential; Future    5. Anxiety disorder, unspecified type  Comments:  stable on lexapro 20mg, continue    6. Need for hepatitis C screening test  -     Hepatitis panel, acute; Future    7. Screening for thyroid disorder  -     TSH; Future  -     T4, free; Future              Follow Up     No follow-ups  on file.    Patient was given instructions and counseling regarding her condition or for health maintenance advice. Please see specific information pulled into the AVS if appropriate.     Katy Denise  reports that she has quit smoking. She smoked 0.25 packs per day. She has never used smokeless tobacco..

## 2022-05-18 DIAGNOSIS — E11.69 TYPE 2 DIABETES MELLITUS WITH OTHER SPECIFIED COMPLICATION, WITHOUT LONG-TERM CURRENT USE OF INSULIN: ICD-10-CM

## 2022-05-18 RX ORDER — DAPAGLIFLOZIN AND METFORMIN HYDROCHLORIDE 10; 1000 MG/1; MG/1
1 TABLET, FILM COATED, EXTENDED RELEASE ORAL
Qty: 90 TABLET | Refills: 1 | OUTPATIENT
Start: 2022-05-18

## 2022-05-18 NOTE — TELEPHONE ENCOUNTER
----- Message from DELIO Gutierrez sent at 5/18/2022  6:35 AM EDT -----  Stable recheck a1c in 3 months

## 2022-05-23 ENCOUNTER — LAB (OUTPATIENT)
Dept: LAB | Facility: HOSPITAL | Age: 47
End: 2022-05-23

## 2022-05-23 DIAGNOSIS — I10 ESSENTIAL HYPERTENSION: ICD-10-CM

## 2022-05-23 DIAGNOSIS — E11.69 TYPE 2 DIABETES MELLITUS WITH OTHER SPECIFIED COMPLICATION, WITHOUT LONG-TERM CURRENT USE OF INSULIN: ICD-10-CM

## 2022-05-23 DIAGNOSIS — Z13.29 SCREENING FOR THYROID DISORDER: ICD-10-CM

## 2022-05-23 DIAGNOSIS — Z11.59 NEED FOR HEPATITIS C SCREENING TEST: ICD-10-CM

## 2022-05-23 DIAGNOSIS — E78.2 MIXED HYPERLIPIDEMIA: ICD-10-CM

## 2022-05-23 LAB
ALBUMIN SERPL-MCNC: 4.2 G/DL (ref 3.5–5.2)
ALBUMIN UR-MCNC: 1.9 MG/DL
ALBUMIN/GLOB SERPL: 2.1 G/DL
ALP SERPL-CCNC: 42 U/L (ref 39–117)
ALT SERPL W P-5'-P-CCNC: 32 U/L (ref 1–33)
ANION GAP SERPL CALCULATED.3IONS-SCNC: 11 MMOL/L (ref 5–15)
AST SERPL-CCNC: 19 U/L (ref 1–32)
BASOPHILS # BLD AUTO: 0.05 10*3/MM3 (ref 0–0.2)
BASOPHILS NFR BLD AUTO: 0.9 % (ref 0–1.5)
BILIRUB SERPL-MCNC: 0.3 MG/DL (ref 0–1.2)
BUN SERPL-MCNC: 14 MG/DL (ref 6–20)
BUN/CREAT SERPL: 26.4 (ref 7–25)
CALCIUM SPEC-SCNC: 8.9 MG/DL (ref 8.6–10.5)
CHLORIDE SERPL-SCNC: 105 MMOL/L (ref 98–107)
CHOLEST SERPL-MCNC: 102 MG/DL (ref 0–200)
CO2 SERPL-SCNC: 22 MMOL/L (ref 22–29)
CREAT SERPL-MCNC: 0.53 MG/DL (ref 0.57–1)
DEPRECATED RDW RBC AUTO: 47 FL (ref 37–54)
EGFRCR SERPLBLD CKD-EPI 2021: 116.4 ML/MIN/1.73
EOSINOPHIL # BLD AUTO: 0.08 10*3/MM3 (ref 0–0.4)
EOSINOPHIL NFR BLD AUTO: 1.4 % (ref 0.3–6.2)
ERYTHROCYTE [DISTWIDTH] IN BLOOD BY AUTOMATED COUNT: 13.3 % (ref 12.3–15.4)
GLOBULIN UR ELPH-MCNC: 2 GM/DL
GLUCOSE SERPL-MCNC: 156 MG/DL (ref 65–99)
HAV IGM SERPL QL IA: NORMAL
HBV CORE IGM SERPL QL IA: NORMAL
HBV SURFACE AG SERPL QL IA: NORMAL
HCT VFR BLD AUTO: 42.2 % (ref 34–46.6)
HCV AB SER DONR QL: NORMAL
HDLC SERPL-MCNC: 42 MG/DL (ref 40–60)
HGB BLD-MCNC: 13.6 G/DL (ref 12–15.9)
IMM GRANULOCYTES # BLD AUTO: 0.02 10*3/MM3 (ref 0–0.05)
IMM GRANULOCYTES NFR BLD AUTO: 0.3 % (ref 0–0.5)
LDLC SERPL CALC-MCNC: 34 MG/DL (ref 0–100)
LDLC/HDLC SERPL: 0.71 {RATIO}
LYMPHOCYTES # BLD AUTO: 2.61 10*3/MM3 (ref 0.7–3.1)
LYMPHOCYTES NFR BLD AUTO: 44.5 % (ref 19.6–45.3)
MCH RBC QN AUTO: 30.5 PG (ref 26.6–33)
MCHC RBC AUTO-ENTMCNC: 32.2 G/DL (ref 31.5–35.7)
MCV RBC AUTO: 94.6 FL (ref 79–97)
MONOCYTES # BLD AUTO: 0.6 10*3/MM3 (ref 0.1–0.9)
MONOCYTES NFR BLD AUTO: 10.2 % (ref 5–12)
NEUTROPHILS NFR BLD AUTO: 2.51 10*3/MM3 (ref 1.7–7)
NEUTROPHILS NFR BLD AUTO: 42.7 % (ref 42.7–76)
NRBC BLD AUTO-RTO: 0 /100 WBC (ref 0–0.2)
PLATELET # BLD AUTO: 206 10*3/MM3 (ref 140–450)
PMV BLD AUTO: 11.3 FL (ref 6–12)
POTASSIUM SERPL-SCNC: 3.8 MMOL/L (ref 3.5–5.2)
PROT SERPL-MCNC: 6.2 G/DL (ref 6–8.5)
RBC # BLD AUTO: 4.46 10*6/MM3 (ref 3.77–5.28)
SODIUM SERPL-SCNC: 138 MMOL/L (ref 136–145)
T4 FREE SERPL-MCNC: 0.92 NG/DL (ref 0.93–1.7)
TRIGL SERPL-MCNC: 151 MG/DL (ref 0–150)
TSH SERPL DL<=0.05 MIU/L-ACNC: 1.74 UIU/ML (ref 0.27–4.2)
VLDLC SERPL-MCNC: 26 MG/DL (ref 5–40)
WBC NRBC COR # BLD: 5.87 10*3/MM3 (ref 3.4–10.8)

## 2022-05-23 PROCEDURE — 36415 COLL VENOUS BLD VENIPUNCTURE: CPT

## 2022-05-23 PROCEDURE — 82043 UR ALBUMIN QUANTITATIVE: CPT

## 2022-05-23 PROCEDURE — 80061 LIPID PANEL: CPT

## 2022-05-23 PROCEDURE — 84439 ASSAY OF FREE THYROXINE: CPT

## 2022-05-23 PROCEDURE — 80050 GENERAL HEALTH PANEL: CPT

## 2022-05-23 PROCEDURE — 80074 ACUTE HEPATITIS PANEL: CPT

## 2022-05-24 ENCOUNTER — TELEPHONE (OUTPATIENT)
Dept: FAMILY MEDICINE CLINIC | Facility: CLINIC | Age: 47
End: 2022-05-24

## 2022-06-06 ENCOUNTER — CLINICAL SUPPORT (OUTPATIENT)
Dept: FAMILY MEDICINE CLINIC | Facility: CLINIC | Age: 47
End: 2022-06-06

## 2022-06-06 DIAGNOSIS — Z23 NEED FOR PNEUMOCOCCAL VACCINATION: ICD-10-CM

## 2022-06-06 DIAGNOSIS — Z23 NEED FOR TDAP VACCINATION: Primary | ICD-10-CM

## 2022-06-06 PROCEDURE — 90715 TDAP VACCINE 7 YRS/> IM: CPT | Performed by: NURSE PRACTITIONER

## 2022-06-06 PROCEDURE — 90677 PCV20 VACCINE IM: CPT | Performed by: NURSE PRACTITIONER

## 2022-06-06 PROCEDURE — 90472 IMMUNIZATION ADMIN EACH ADD: CPT | Performed by: NURSE PRACTITIONER

## 2022-06-06 PROCEDURE — 90471 IMMUNIZATION ADMIN: CPT | Performed by: NURSE PRACTITIONER

## 2022-06-16 ENCOUNTER — OFFICE VISIT (OUTPATIENT)
Dept: FAMILY MEDICINE CLINIC | Facility: CLINIC | Age: 47
End: 2022-06-16

## 2022-06-16 VITALS
BODY MASS INDEX: 32.47 KG/M2 | OXYGEN SATURATION: 99 % | WEIGHT: 202 LBS | SYSTOLIC BLOOD PRESSURE: 112 MMHG | HEIGHT: 66 IN | DIASTOLIC BLOOD PRESSURE: 65 MMHG | HEART RATE: 79 BPM

## 2022-06-16 DIAGNOSIS — E78.5 HYPERLIPIDEMIA, UNSPECIFIED HYPERLIPIDEMIA TYPE: ICD-10-CM

## 2022-06-16 DIAGNOSIS — F41.9 ANXIETY: ICD-10-CM

## 2022-06-16 DIAGNOSIS — E11.69 TYPE 2 DIABETES MELLITUS WITH OTHER SPECIFIED COMPLICATION, WITHOUT LONG-TERM CURRENT USE OF INSULIN: Primary | ICD-10-CM

## 2022-06-16 PROCEDURE — 99214 OFFICE O/P EST MOD 30 MIN: CPT | Performed by: NURSE PRACTITIONER

## 2022-06-16 RX ORDER — BUSPIRONE HYDROCHLORIDE 7.5 MG/1
7.5 TABLET ORAL 2 TIMES DAILY PRN
Qty: 60 TABLET | Refills: 2 | Status: SHIPPED | OUTPATIENT
Start: 2022-06-16 | End: 2023-01-03

## 2022-06-16 NOTE — PROGRESS NOTES
Chief Complaint  Diabetes, anxiety, HLD    Subjective            Katy Denise presents to [facility]  Pt here today for one month check on DM.    Pt switched from Xigduo to Ozempic. Pt states she feels good taking it. PT has lost 2lbs.    She also complains of increased anxiety since she has been taking care of her elderly mom.  She reports doing well on the lexapro but wonders if she can have a prn med to take when her anxiety goes up.    She is doing well on her crestor.        Past Medical History:   Diagnosis Date   • Allergic rhinitis due to allergen    • Anxiety disorder    • Arthritis    • Diabetes mellitus, type 2 (HCC)    • Essential hypertension    • GERD (gastroesophageal reflux disease)    • Hyperlipidemia    • Migraine        No Known Allergies     Past Surgical History:   Procedure Laterality Date   • CARPAL TUNNEL RELEASE Bilateral    •  SECTION     • HYSTERECTOMY     • LUMBAR SPINE SURGERY      Microdiscectomy of thoracic or lumbar spine   • TUBAL ABDOMINAL LIGATION          Social History     Tobacco Use   • Smoking status: Former Smoker     Packs/day: 0.25   • Smokeless tobacco: Never Used   • Tobacco comment: off and on for 30 years; .23ppd   Substance Use Topics   • Alcohol use: Yes     Comment: current some day; 2/week       Family History   Problem Relation Age of Onset   • Stroke Other    • Heart disease Other    • Liver disease Other         cirrhosis   • Diabetes Other         Current Outpatient Medications on File Prior to Visit   Medication Sig   • diphenhydrAMINE (BENADRYL) 25 mg capsule Benadryl 25 mg oral capsule take 2 capsules (50 mg) by oral route once daily at bedtime as needed   Active   • EPINEPHrine (EPIPEN) 0.3 MG/0.3ML solution auto-injector injection 0.3 mL.   • escitalopram (LEXAPRO) 20 MG tablet TAKE 1 TABLET BY MOUTH DAILY   • hyoscyamine (ANASPAZ,LEVSIN) 0.125 MG tablet hyoscyamine sulfate 0.125 mg oral tablet take 1 tablet  "(0.125 mg) by oral route 3 times per day   Active   • losartan (COZAAR) 25 MG tablet TAKE 1 TABLET BY MOUTH DAILY   • metFORMIN (Glucophage) 1000 MG tablet Take 1 tablet by mouth 2 (Two) Times a Day With Meals.   • propranolol LA (INDERAL LA) 80 MG 24 hr capsule TAKE 1 CAPSULE BY MOUTH DAILY   • rosuvastatin (CRESTOR) 5 MG tablet Take 1 tablet by mouth Every Night.   • Semaglutide,0.25 or 0.5MG/DOS, (OZEMPIC) 2 MG/1.5ML solution pen-injector Inject 0.25 mg under the skin into the appropriate area as directed 1 (One) Time Per Week.     No current facility-administered medications on file prior to visit.       Health Maintenance Due   Topic Date Due   • COLORECTAL CANCER SCREENING  Never done       Objective     /65   Pulse 79   Ht 167.6 cm (66\")   Wt 91.6 kg (202 lb)   SpO2 99%   BMI 32.60 kg/m²       Physical Exam  Constitutional:       General: She is not in acute distress.     Appearance: Normal appearance. She is not ill-appearing.   HENT:      Head: Normocephalic and atraumatic.   Cardiovascular:      Rate and Rhythm: Normal rate and regular rhythm.      Heart sounds: Normal heart sounds. No murmur heard.  Pulmonary:      Effort: Pulmonary effort is normal. No respiratory distress.      Breath sounds: Normal breath sounds.   Chest:      Chest wall: No tenderness.   Abdominal:      General: There is no distension.      Palpations: There is no mass.      Tenderness: There is no abdominal tenderness. There is no guarding.   Musculoskeletal:         General: No swelling or tenderness. Normal range of motion.      Cervical back: Normal range of motion and neck supple.   Skin:     General: Skin is warm and dry.      Findings: No rash.   Neurological:      General: No focal deficit present.      Mental Status: She is alert and oriented to person, place, and time. Mental status is at baseline.      Gait: Gait normal.   Psychiatric:         Attention and Perception: Attention normal.         Mood and Affect: " Mood normal.         Speech: Speech normal.         Behavior: Behavior normal.         Thought Content: Thought content normal. Thought content does not include suicidal ideation.         Cognition and Memory: Cognition normal.         Judgment: Judgment normal.           Result Review :                           Assessment and Plan        Diagnoses and all orders for this visit:    1. Type 2 diabetes mellitus with other specified complication, without long-term current use of insulin (HCC) (Primary)  Comments:  stable on ozempic and metformin , continue    2. Anxiety  -     busPIRone (BUSPAR) 7.5 MG tablet; Take 1 tablet by mouth 2 (Two) Times a Day As Needed (anxiety).  Dispense: 60 tablet; Refill: 2    3. Hyperlipidemia, unspecified hyperlipidemia type  Comments:  stable on crestor 5mg, continue              Follow Up     Return in about 3 months (around 9/16/2022).    Patient was given instructions and counseling regarding her condition or for health maintenance advice. Please see specific information pulled into the AVS if appropriate.     Katy Denise  reports that she has quit smoking. She smoked 0.25 packs per day. She has never used smokeless tobacco..

## 2022-06-28 ENCOUNTER — TELEPHONE (OUTPATIENT)
Dept: FAMILY MEDICINE CLINIC | Facility: CLINIC | Age: 47
End: 2022-06-28

## 2022-06-28 NOTE — TELEPHONE ENCOUNTER
Pt called inquiring about increasing ozempic. Pt has been on ozempic 0.25 for a month. Would you like pt to increase to 0.5.    Please advise

## 2022-06-29 ENCOUNTER — TELEPHONE (OUTPATIENT)
Dept: FAMILY MEDICINE CLINIC | Facility: CLINIC | Age: 47
End: 2022-06-29

## 2022-06-29 NOTE — TELEPHONE ENCOUNTER
Can increase to 0.5mg q week for 4 weeks will reeval at that time if still not good control will consider increase to 1mg

## 2022-06-29 NOTE — TELEPHONE ENCOUNTER
Hub staff attempted to follow warm transfer process and was unsuccessful     Caller: Katy Denise    Relationship to patient: Self    Best call back number: [phone]    Patient is needing: PATIENT RETURNING [facility]S CALL

## 2022-07-11 ENCOUNTER — PREP FOR SURGERY (OUTPATIENT)
Dept: OTHER | Facility: HOSPITAL | Age: 47
End: 2022-07-11

## 2022-07-11 ENCOUNTER — OFFICE VISIT (OUTPATIENT)
Dept: SURGERY | Facility: CLINIC | Age: 47
End: 2022-07-11

## 2022-07-11 VITALS — WEIGHT: 197 LBS | HEART RATE: 86 BPM | BODY MASS INDEX: 31.66 KG/M2 | RESPIRATION RATE: 16 BRPM | HEIGHT: 66 IN

## 2022-07-11 DIAGNOSIS — Z80.0 FAMILY HISTORY OF COLON CANCER: ICD-10-CM

## 2022-07-11 DIAGNOSIS — Z12.11 SCREENING FOR MALIGNANT NEOPLASM OF COLON: Primary | ICD-10-CM

## 2022-07-11 PROCEDURE — S0285 CNSLT BEFORE SCREEN COLONOSC: HCPCS | Performed by: NURSE PRACTITIONER

## 2022-07-11 RX ORDER — POLYETHYLENE GLYCOL 3350 17 G/17G
POWDER, FOR SOLUTION ORAL
Qty: 238 PACKET | Refills: 0 | Status: ON HOLD | OUTPATIENT
Start: 2022-07-11 | End: 2022-09-08

## 2022-07-11 NOTE — PROGRESS NOTES
Chief Complaint: Colonoscopy (consult)    Subjective      Colonoscopy consultation       History of Present Illness  Katy Denise is a 47 y.o. female presents to [facility] for colonoscopy consultation.    Patient presents today on referral from Nataliya Lemus for screening colonoscopy.  Patient denies any abdominal pain, change in bowel habit, rectal bleeding.    Admits to family history of colon cancer with her maternal grandmother.    Patient reports last colonoscopy was in 2018 and was normal.    Objective     Past Medical History:   Diagnosis Date   • Allergic rhinitis due to allergen    • Anxiety disorder    • Arthritis    • Diabetes mellitus, type 2 (HCC)    • Essential hypertension    • GERD (gastroesophageal reflux disease)    • Hyperlipidemia    • Migraine        Past Surgical History:   Procedure Laterality Date   • CARPAL TUNNEL RELEASE Bilateral    •  SECTION     • HYSTERECTOMY     • LUMBAR SPINE SURGERY      Microdiscectomy of thoracic or lumbar spine   • TUBAL ABDOMINAL LIGATION         Outpatient Medications Marked as Taking for the 22 encounter (Office Visit) with Marguerite Joshi APRJEFF   Medication Sig Dispense Refill   • busPIRone (BUSPAR) 7.5 MG tablet Take 1 tablet by mouth 2 (Two) Times a Day As Needed (anxiety). 60 tablet 2   • diphenhydrAMINE (BENADRYL) 25 mg capsule Benadryl 25 mg oral capsule take 2 capsules (50 mg) by oral route once daily at bedtime as needed   Active     • EPINEPHrine (EPIPEN) 0.3 MG/0.3ML solution auto-injector injection 0.3 mL.     • escitalopram (LEXAPRO) 20 MG tablet TAKE 1 TABLET BY MOUTH DAILY 90 tablet 1   • hyoscyamine (ANASPAZ,LEVSIN) 0.125 MG tablet hyoscyamine sulfate 0.125 mg oral tablet take 1 tablet (0.125 mg) by oral route 3 times per day   Active     • losartan (COZAAR) 25 MG tablet TAKE 1 TABLET BY MOUTH DAILY 90 tablet 1   • metFORMIN (Glucophage) 1000 MG tablet Take 1 tablet by mouth 2 (Two) Times a Day  "With Meals. 180 tablet 1   • propranolol LA (INDERAL LA) 80 MG 24 hr capsule TAKE 1 CAPSULE BY MOUTH DAILY 90 capsule 1   • rosuvastatin (CRESTOR) 5 MG tablet Take 1 tablet by mouth Every Night. 90 tablet 1   • Semaglutide,0.25 or 0.5MG/DOS, (OZEMPIC) 2 MG/1.5ML solution pen-injector Inject 0.25 mg under the skin into the appropriate area as directed 1 (One) Time Per Week. 3 pen 2       No Known Allergies     Family History   Problem Relation Age of Onset   • Stroke Other    • Heart disease Other    • Liver disease Other         cirrhosis   • Diabetes Other        Social History     Socioeconomic History   • Marital status:    Tobacco Use   • Smoking status: Former Smoker     Packs/day: 0.25   • Smokeless tobacco: Never Used   • Tobacco comment: off and on for 30 years; .23ppd   Vaping Use   • Vaping Use: Never used   Substance and Sexual Activity   • Alcohol use: Yes     Comment: current some day; 2/week   • Drug use: Never   • Sexual activity: Defer       Review of Systems   Constitutional: Negative for chills and fever.   Gastrointestinal: Negative for abdominal distention, abdominal pain, anal bleeding, blood in stool, constipation, diarrhea and rectal pain.        Vital Signs:   Pulse 86   Resp 16   Ht 167.6 cm (66\")   Wt 89.4 kg (197 lb)   BMI 31.80 kg/m²      Physical Exam  Vitals and nursing note reviewed.   Constitutional:       General: She is not in acute distress.     Appearance: Normal appearance.   HENT:      Head: Normocephalic.   Cardiovascular:      Rate and Rhythm: Normal rate.   Pulmonary:      Breath sounds: No wheezing.   Abdominal:      General: Abdomen is flat.      Palpations: Abdomen is soft.   Skin:     General: Skin is warm and dry.   Neurological:      General: No focal deficit present.      Mental Status: She is alert and oriented to person, place, and time.   Psychiatric:         Mood and Affect: Mood normal.         Thought Content: Thought content normal.          Result " Review :          []  Laboratory  []  Radiology  []  Pathology  []  Microbiology  []  EKG/Telemetry   []  Cardiology/Vascular   [x]  Old records  Today I reviewed Nataliya Lemus's previous office note.     Assessment and Plan    Diagnoses and all orders for this visit:    1. Screening for malignant neoplasm of colon (Primary)    2. Family history of colon cancer    Other orders  -     polyethylene glycol (MIRALAX) 17 g packet; Take as directed.  Instructions given in office.  Dispense: 238 g bottle  Dispense: 238 packet; Refill: 0    orange prep    Follow Up   Return for Schedule colonoscopy on 9/8/2022 with Dr. Yates at [facility], [place].     Hospital arrival time: 0800.    Possible risks/complications, benefits, and alternatives to surgical or invasive procedure have been explained to patient and/or legal guardian.     Patient has been evaluated and can tolerate anesthesia and/or sedation. Risks, benefits, and alternatives to anesthesia and sedation have been explained to patient and/or legal guardian.  Patient verbalizes understanding is will proceed with above plan.    Patient was given instructions and counseling regarding her condition or for health maintenance advice. Please see specific information pulled into the AVS if appropriate.     EMR dragon/transcription disclaimer: Much of this encounter note is an electronic transcription/translation of spoken language to printed text. Electronic translation of spoken language may permit erroneous, or at times nonsensical words or phrases to be inadvertently transcribed; although I have reviewed the note for such errors, some may still exist.

## 2022-07-25 DIAGNOSIS — F41.9 ANXIETY DISORDER, UNSPECIFIED TYPE: ICD-10-CM

## 2022-07-25 DIAGNOSIS — F33.0 MILD EPISODE OF RECURRENT MAJOR DEPRESSIVE DISORDER: ICD-10-CM

## 2022-07-25 RX ORDER — ESCITALOPRAM OXALATE 20 MG/1
20 TABLET ORAL DAILY
Qty: 90 TABLET | Refills: 1 | Status: SHIPPED | OUTPATIENT
Start: 2022-07-25 | End: 2023-01-23

## 2022-08-01 ENCOUNTER — LAB (OUTPATIENT)
Dept: LAB | Facility: HOSPITAL | Age: 47
End: 2022-08-01

## 2022-08-01 DIAGNOSIS — E11.69 TYPE 2 DIABETES MELLITUS WITH OTHER SPECIFIED COMPLICATION, WITHOUT LONG-TERM CURRENT USE OF INSULIN: ICD-10-CM

## 2022-08-01 LAB — HBA1C MFR BLD: 6.5 % (ref 4.8–5.6)

## 2022-08-01 PROCEDURE — 36415 COLL VENOUS BLD VENIPUNCTURE: CPT

## 2022-08-01 PROCEDURE — 83036 HEMOGLOBIN GLYCOSYLATED A1C: CPT

## 2022-08-02 DIAGNOSIS — E11.69 TYPE 2 DIABETES MELLITUS WITH OTHER SPECIFIED COMPLICATION, WITHOUT LONG-TERM CURRENT USE OF INSULIN: Primary | ICD-10-CM

## 2022-08-02 DIAGNOSIS — E11.69 TYPE 2 DIABETES MELLITUS WITH OTHER SPECIFIED COMPLICATION, WITHOUT LONG-TERM CURRENT USE OF INSULIN: ICD-10-CM

## 2022-08-02 NOTE — TELEPHONE ENCOUNTER
Pt aware of a1c.   Labs ordered.     Pt states she is taking Ozempic 0.5 weekly and is requesting a refill.

## 2022-08-02 NOTE — TELEPHONE ENCOUNTER
----- Message from DELIO Gutierrez sent at 8/2/2022  6:33 AM EDT -----  A1c has improved continue current med and repeat in 3 months

## 2022-08-10 DIAGNOSIS — I10 ESSENTIAL HYPERTENSION: ICD-10-CM

## 2022-08-10 RX ORDER — PROPRANOLOL HYDROCHLORIDE 80 MG/1
80 CAPSULE, EXTENDED RELEASE ORAL DAILY
Qty: 90 CAPSULE | Refills: 1 | Status: SHIPPED | OUTPATIENT
Start: 2022-08-10 | End: 2022-12-02 | Stop reason: SDUPTHER

## 2022-08-10 RX ORDER — LOSARTAN POTASSIUM 25 MG/1
25 TABLET ORAL DAILY
Qty: 90 TABLET | Refills: 1 | Status: SHIPPED | OUTPATIENT
Start: 2022-08-10 | End: 2023-01-23

## 2022-08-12 ENCOUNTER — TELEPHONE (OUTPATIENT)
Dept: FAMILY MEDICINE CLINIC | Facility: CLINIC | Age: 47
End: 2022-08-12

## 2022-08-12 DIAGNOSIS — M25.531 RIGHT WRIST PAIN: ICD-10-CM

## 2022-08-12 DIAGNOSIS — M77.9 TENDONITIS: Primary | ICD-10-CM

## 2022-08-12 NOTE — TELEPHONE ENCOUNTER
----- Message from Katy Denise sent at 8/12/2022 10:25 AM EDT -----  Regarding: Tendonitis   No. They are in [place]. I would prefer one here... asif. Thank you!

## 2022-08-12 NOTE — TELEPHONE ENCOUNTER
Per Nataliya : May go ahead and refer to ortho if pt wishes, or is she would like another MD such as her previous hand specialists we can refer there as well just get that information from her      Referral placed to [facility].

## 2022-08-18 ENCOUNTER — OFFICE VISIT (OUTPATIENT)
Dept: ORTHOPEDIC SURGERY | Facility: CLINIC | Age: 47
End: 2022-08-18

## 2022-08-18 VITALS — HEIGHT: 66 IN | WEIGHT: 200 LBS | BODY MASS INDEX: 32.14 KG/M2

## 2022-08-18 DIAGNOSIS — M65.4 DE QUERVAIN'S TENOSYNOVITIS: Primary | ICD-10-CM

## 2022-08-18 PROCEDURE — 99213 OFFICE O/P EST LOW 20 MIN: CPT | Performed by: ORTHOPAEDIC SURGERY

## 2022-08-18 PROCEDURE — 20605 DRAIN/INJ JOINT/BURSA W/O US: CPT | Performed by: ORTHOPAEDIC SURGERY

## 2022-08-18 RX ORDER — TRIAMCINOLONE ACETONIDE 40 MG/ML
40 INJECTION, SUSPENSION INTRA-ARTICULAR; INTRAMUSCULAR
Status: COMPLETED | OUTPATIENT
Start: 2022-08-18 | End: 2022-08-18

## 2022-08-18 RX ORDER — LIDOCAINE HYDROCHLORIDE 10 MG/ML
1 INJECTION, SOLUTION INFILTRATION; PERINEURAL
Status: COMPLETED | OUTPATIENT
Start: 2022-08-18 | End: 2022-08-18

## 2022-08-18 RX ADMIN — TRIAMCINOLONE ACETONIDE 40 MG: 40 INJECTION, SUSPENSION INTRA-ARTICULAR; INTRAMUSCULAR at 16:16

## 2022-08-18 RX ADMIN — LIDOCAINE HYDROCHLORIDE 1 ML: 10 INJECTION, SOLUTION INFILTRATION; PERINEURAL at 16:16

## 2022-08-18 NOTE — PROGRESS NOTES
"Chief Complaint  Initial Evaluation of the Right Wrist     Subjective      Katy Denise presents to [facility] for an evaluation of right wrist. She states many years she had tendinitis at the base of the thumb. She had done well until recently. She states burning and pain at the base of the thumb that radiates into the thumb. She denies any injury or trauma to the wrist.     No Known Allergies     Social History     Socioeconomic History   • Marital status:    Tobacco Use   • Smoking status: Former Smoker     Packs/day: 0.25   • Smokeless tobacco: Never Used   • Tobacco comment: off and on for 30 years; .23ppd   Vaping Use   • Vaping Use: Never used   Substance and Sexual Activity   • Alcohol use: Yes     Comment: current some day; 2/week   • Drug use: Never   • Sexual activity: Defer        Review of Systems     Objective   Vital Signs:   Ht 167.6 cm (66\")   Wt 90.7 kg (200 lb)   BMI 32.28 kg/m²       Physical Exam  Constitutional:       Appearance: Normal appearance. Patient is well-developed and normal weight.   HENT:      Head: Normocephalic.      Right Ear: Hearing and external ear normal.      Left Ear: Hearing and external ear normal.      Nose: Nose normal.   Eyes:      Conjunctiva/sclera: Conjunctivae normal.   Cardiovascular:      Rate and Rhythm: Normal rate.   Pulmonary:      Effort: Pulmonary effort is normal.      Breath sounds: No wheezing or rales.   Abdominal:      Palpations: Abdomen is soft.      Tenderness: There is no abdominal tenderness.   Musculoskeletal:      Cervical back: Normal range of motion.   Skin:     Findings: No rash.   Neurological:      Mental Status: Patient is alert and oriented to person, place, and time.   Psychiatric:         Mood and Affect: Mood and affect normal.         Judgment: Judgment normal.       Ortho Exam      RIGHT WRIST: Positive finkelstein's testing. No swelling, skin discoloration or atrophy. Negative tinel's. Skin " intact. Radial pulse 2+, ulnar pulse 2+. Full wrist flexion and extension. Tender to palpation at the base of the thumb.       Small Joint Arthrocentesis  Consent given by: patient  Site marked: site marked  Timeout: Immediately prior to procedure a time out was called to verify the correct patient, procedure, equipment, support staff and site/side marked as required   Procedure Details  Preparation: Patient was prepped and draped in the usual sterile fashion  Medications administered: 1 mL lidocaine 1 %; 40 mg triamcinolone acetonide 40 MG/ML  Patient tolerance: patient tolerated the procedure well with no immediate complications            Imaging Results (Most Recent)     None           Result Review :         No results found.           Assessment and Plan     Diagnoses and all orders for this visit:    1. De Quervain's tenosynovitis (Primary)        Injections, medication and therapy exercises discussed. Bracing has failed her thus far. Patient opted to try an injection today. De quervain's injection given, she tolerated this well. She will take tylenol as needed. Patient states that she not do NSAIDs.     Call or return if worsening symptoms.    Follow Up     PRN.       Patient was given instructions and counseling regarding her condition or for health maintenance advice. Please see specific information pulled into the AVS if appropriate.     Scribed for Codie Morales MD by Jasmin Phipps.  08/18/22   16:02 EDT    I have personally performed the services described in this document as scribed by the above individual and it is both accurate and complete. Codie Morales MD 08/18/22

## 2022-09-06 NOTE — PRE-PROCEDURE INSTRUCTIONS
PT INSTRUCTED ON CLEAR LIQ DIET AND PO SPLIT PREP LAST BM SHOULD BE CLEAR  PT CAN TAKE  lexapro   WITH A SIP OF WATER IN THE AM OF THE PROCEDURE ARRIVAL TIME IS 0800 am ON 9/8/22 blood sugar ordered

## 2022-09-08 ENCOUNTER — ANESTHESIA EVENT (OUTPATIENT)
Dept: GASTROENTEROLOGY | Facility: HOSPITAL | Age: 47
End: 2022-09-08

## 2022-09-08 ENCOUNTER — HOSPITAL ENCOUNTER (OUTPATIENT)
Facility: HOSPITAL | Age: 47
Setting detail: HOSPITAL OUTPATIENT SURGERY
Discharge: HOME | End: 2022-09-08
Attending: SURGERY | Admitting: SURGERY

## 2022-09-08 ENCOUNTER — ANESTHESIA (OUTPATIENT)
Dept: GASTROENTEROLOGY | Facility: HOSPITAL | Age: 47
End: 2022-09-08

## 2022-09-08 VITALS
HEART RATE: 74 BPM | BODY MASS INDEX: 31.95 KG/M2 | SYSTOLIC BLOOD PRESSURE: 107 MMHG | WEIGHT: 197.97 LBS | RESPIRATION RATE: 17 BRPM | TEMPERATURE: 98.1 F | DIASTOLIC BLOOD PRESSURE: 73 MMHG | OXYGEN SATURATION: 92 %

## 2022-09-08 DIAGNOSIS — Z12.11 SCREENING FOR MALIGNANT NEOPLASM OF COLON: ICD-10-CM

## 2022-09-08 DIAGNOSIS — Z80.0 FAMILY HISTORY OF COLON CANCER: ICD-10-CM

## 2022-09-08 LAB — GLUCOSE BLDC GLUCOMTR-MCNC: 108 MG/DL (ref 70–99)

## 2022-09-08 PROCEDURE — 25010000002 PROPOFOL 10 MG/ML EMULSION: Performed by: NURSE ANESTHETIST, CERTIFIED REGISTERED

## 2022-09-08 PROCEDURE — 88305 TISSUE EXAM BY PATHOLOGIST: CPT | Performed by: SURGERY

## 2022-09-08 PROCEDURE — 82962 GLUCOSE BLOOD TEST: CPT

## 2022-09-08 RX ORDER — LIDOCAINE HYDROCHLORIDE 20 MG/ML
INJECTION, SOLUTION EPIDURAL; INFILTRATION; INTRACAUDAL; PERINEURAL AS NEEDED
Status: DISCONTINUED | OUTPATIENT
Start: 2022-09-08 | End: 2022-09-08 | Stop reason: SURG

## 2022-09-08 RX ORDER — PROPOFOL 10 MG/ML
VIAL (ML) INTRAVENOUS AS NEEDED
Status: DISCONTINUED | OUTPATIENT
Start: 2022-09-08 | End: 2022-09-08 | Stop reason: SURG

## 2022-09-08 RX ORDER — SODIUM CHLORIDE, SODIUM LACTATE, POTASSIUM CHLORIDE, CALCIUM CHLORIDE 600; 310; 30; 20 MG/100ML; MG/100ML; MG/100ML; MG/100ML
30 INJECTION, SOLUTION INTRAVENOUS CONTINUOUS
Status: DISCONTINUED | OUTPATIENT
Start: 2022-09-08 | End: 2022-09-08 | Stop reason: HOSPADM

## 2022-09-08 RX ADMIN — LIDOCAINE HYDROCHLORIDE 40 MG: 20 INJECTION, SOLUTION EPIDURAL; INFILTRATION; INTRACAUDAL; PERINEURAL at 09:47

## 2022-09-08 RX ADMIN — PROPOFOL 50 MG: 10 INJECTION, EMULSION INTRAVENOUS at 09:49

## 2022-09-08 RX ADMIN — PROPOFOL 50 MG: 10 INJECTION, EMULSION INTRAVENOUS at 09:47

## 2022-09-08 RX ADMIN — SODIUM CHLORIDE, POTASSIUM CHLORIDE, SODIUM LACTATE AND CALCIUM CHLORIDE: 600; 310; 30; 20 INJECTION, SOLUTION INTRAVENOUS at 09:45

## 2022-09-08 RX ADMIN — PROPOFOL 250 MCG/KG/MIN: 10 INJECTION, EMULSION INTRAVENOUS at 09:47

## 2022-09-08 RX ADMIN — PROPOFOL 50 MG: 10 INJECTION, EMULSION INTRAVENOUS at 10:00

## 2022-09-08 NOTE — ANESTHESIA PREPROCEDURE EVALUATION
Anesthesia Evaluation     Patient summary reviewed and Nursing notes reviewed   history of anesthetic complications: PONV  NPO Solid Status: > 8 hours  NPO Liquid Status: > 2 hours           Airway   Mallampati: II  TM distance: >3 FB  Neck ROM: full  No difficulty expected  Dental - normal exam     Pulmonary - normal exam    breath sounds clear to auscultation  (+) a smoker Former,   Cardiovascular - normal exam  Exercise tolerance: good (4-7 METS)    Beta blocker given within 24 hours of surgery  Rhythm: regular  Rate: normal    (+) hyperlipidemia,       Neuro/Psych  (+) headaches (betablockers for migraine), psychiatric history Anxiety and Depression,    GI/Hepatic/Renal/Endo    (+)  GERD,  diabetes mellitus type 2,     Musculoskeletal     Abdominal    Substance History - negative use     OB/GYN negative ob/gyn ROS         Other   arthritis,      ROS/Med Hx Other: PAT Nursing Notes unavailable.                 Anesthesia Plan    ASA 3     general     (Patient understands anesthesia not responsible for dental damage.)  intravenous induction     Anesthetic plan, risks, benefits, and alternatives have been provided, discussed and informed consent has been obtained with: patient.  Pre-procedure education provided  Use of blood products discussed with patient .   Plan discussed with CRNA.        CODE STATUS:

## 2022-09-08 NOTE — H&P
General Surgery/Colorectal Surgery Note    Patient Name:  Katy Denise  YOB: 1975  [id number]    Referring Provider: Keshawn Yates, *      Patient Care Team:  Nataliya Obrien APRN as PCP - General (Nurse Practitioner)    Subjective .     History of present illness:    HPI   referral from Nataliya Lemus for screening colonoscopy.  Patient denies any abdominal pain, change in bowel habit, rectal bleeding.     Admits to family history of colon cancer with her maternal grandmother.     Patient reports last colonoscopy was in 2018 and was normal.    History:  Past Medical History:   Diagnosis Date   • Allergic rhinitis due to allergen    • Anxiety disorder    • Arthritis    • Diabetes mellitus, type 2 (HCC)    • GERD (gastroesophageal reflux disease)    • Hyperlipidemia    • Migraine        Past Surgical History:   Procedure Laterality Date   • CARPAL TUNNEL RELEASE Bilateral    • COLONOSCOPY     • ENDOSCOPY     • HYSTERECTOMY     • LUMBAR SPINE SURGERY      Microdiscectomy of thoracic or lumbar spine   • TUBAL ABDOMINAL LIGATION         Family History   Problem Relation Age of Onset   • Stroke Other    • Heart disease Other    • Liver disease Other         cirrhosis   • Diabetes Other    • Malig Hyperthermia Neg Hx        Social History     Tobacco Use   • Smoking status: Former Smoker     Packs/day: 0.25     Years: 15.00     Pack years: 3.75     Quit date:      Years since quittin.6   • Smokeless tobacco: Never Used   • Tobacco comment: off and on for 30 years; .23ppd   Vaping Use   • Vaping Use: Never used   Substance Use Topics   • Alcohol use: Yes     Comment: occ   • Drug use: Never       Review of Systems: See HPI    Review of Systems            Medications Prior to Admission   Medication Sig Dispense Refill Last Dose   • busPIRone (BUSPAR) 7.5 MG tablet Take 1 tablet by mouth 2 (Two) Times a Day As Needed (anxiety). 60 tablet 2 Past Week at Unknown time   • diphenhydrAMINE  (BENADRYL) 25 mg capsule Take 25 mg by mouth Every 6 (Six) Hours As Needed.   9/7/2022 at Unknown time   • escitalopram (LEXAPRO) 20 MG tablet TAKE 1 TABLET BY MOUTH DAILY 90 tablet 1 9/7/2022 at Unknown time   • hyoscyamine (ANASPAZ,LEVSIN) 0.125 MG tablet Take 0.125 mg by mouth Every 6 (Six) Hours As Needed.   Past Month at Unknown time   • losartan (COZAAR) 25 MG tablet TAKE 1 TABLET BY MOUTH DAILY 90 tablet 1 9/7/2022 at Unknown time   • metFORMIN (Glucophage) 1000 MG tablet Take 1 tablet by mouth 2 (Two) Times a Day With Meals. (Patient taking differently: Take 1,000 mg by mouth 2 (Two) Times a Day With Meals. Hold wed night and thurs am) 180 tablet 1 9/7/2022 at Unknown time   • propranolol LA (INDERAL LA) 80 MG 24 hr capsule TAKE 1 CAPSULE BY MOUTH DAILY 90 capsule 1 9/7/2022 at Unknown time   • rosuvastatin (CRESTOR) 5 MG tablet Take 1 tablet by mouth Every Night. 90 tablet 1 9/7/2022 at Unknown time   • Semaglutide,0.25 or 0.5MG/DOS, (OZEMPIC) 2 MG/1.5ML solution pen-injector Inject 0.5 mg under the skin into the appropriate area as directed 1 (One) Time Per Week. 3 pen 1 Past Week at Unknown time   • EPINEPHrine (EPIPEN) 0.3 MG/0.3ML solution auto-injector injection 0.3 mL.            Home Meds:      Prior to Admission medications    Medication Sig Start Date End Date Taking? Authorizing Provider   busPIRone (BUSPAR) 7.5 MG tablet Take 1 tablet by mouth 2 (Two) Times a Day As Needed (anxiety). 6/16/22  Yes Nataliya Obrien APRN   diphenhydrAMINE (BENADRYL) 25 mg capsule Take 25 mg by mouth Every 6 (Six) Hours As Needed.   Yes Floresita Padilla MD   escitalopram (LEXAPRO) 20 MG tablet TAKE 1 TABLET BY MOUTH DAILY 7/25/22  Yes Nataliya Obrien APRN   hyoscyamine (ANASPAZ,LEVSIN) 0.125 MG tablet Take 0.125 mg by mouth Every 6 (Six) Hours As Needed.   Yes Floresita Padilla MD   losartan (COZAAR) 25 MG tablet TAKE 1 TABLET BY MOUTH DAILY 8/10/22  Yes Nataliya Obrien APRN   metFORMIN (Glucophage) 1000 MG  tablet Take 1 tablet by mouth 2 (Two) Times a Day With Meals.  Patient taking differently: Take 1,000 mg by mouth 2 (Two) Times a Day With Meals. Hold wed night and thurs am 5/17/22  Yes Nataliya Obrien APRN   propranolol LA (INDERAL LA) 80 MG 24 hr capsule TAKE 1 CAPSULE BY MOUTH DAILY 8/10/22  Yes Nataliya Obrien APRN   rosuvastatin (CRESTOR) 5 MG tablet Take 1 tablet by mouth Every Night. 5/17/22  Yes Nataliya Obrien APRN   Semaglutide,0.25 or 0.5MG/DOS, (OZEMPIC) 2 MG/1.5ML solution pen-injector Inject 0.5 mg under the skin into the appropriate area as directed 1 (One) Time Per Week. 8/2/22  Yes Nataliya Obrien APRN   EPINEPHrine (EPIPEN) 0.3 MG/0.3ML solution auto-injector injection 0.3 mL.    Provider, MD Floresita   polyethylene glycol (MIRALAX) 17 g packet Take as directed.  Instructions given in office.  Dispense: 238 g bottle  Patient taking differently: 17 g. Take as directed.  Instructions given in office.  Dispense: 238 g bottle 7/11/22 9/8/22  Adi, April, APRN            Allergies:  Patient has no known allergies.      Objective     Vital Signs   Temp:  [97.1 °F (36.2 °C)] 97.1 °F (36.2 °C)  Heart Rate:  [73] 73  Resp:  [18] 18  BP: (108)/(64) 108/64    Physical Exam:     Physical Exam    NAD, A/O x 4, normal circulation, normal respiration      Result Review :     Assessment & Plan     There are no diagnoses linked to this encounter.       Risks including bleeding, perforation, pain, infection, missed polyp(s). Benefits, and alternatives of Colonoscopy discussed with patient.  All questions answered.  Consent verified.         Keshawn Yates MD  09/08/22 09:07 EDT

## 2022-09-08 NOTE — ANESTHESIA POSTPROCEDURE EVALUATION
Patient: Katy Denise    Procedure Summary     Date: 09/08/22 Room / Location: [facility] ENDOSCOPY 1 / [facility]    Anesthesia Start: 0945 Anesthesia Stop: 1009    Procedure: COLONOSCOPY with cold snare polypectomy (N/A ) Diagnosis:       Screening for malignant neoplasm of colon      Family history of colon cancer      (Screening for malignant neoplasm of colon [Z12.11])      (Family history of colon cancer [Z80.0])    Surgeons: Keshawn Yates MD Provider: Reyes, Mirabelle, DO    Anesthesia Type: general ASA Status: 3          Anesthesia Type: general    Vitals  Vitals Value Taken Time   /64 09/08/22 1022   Temp 36.7 °C (98.1 °F) 09/08/22 1009   Pulse 69 09/08/22 1025   Resp 18 09/08/22 1022   SpO2 92 % 09/08/22 1025   Vitals shown include unvalidated device data.        Post Anesthesia Care and Evaluation    Patient location during evaluation: bedside  Patient participation: complete - patient participated  Level of consciousness: awake  Pain score: 0  Pain management: adequate    Airway patency: patent  Anesthetic complications: No anesthetic complications  PONV Status: none  Cardiovascular status: acceptable and stable  Respiratory status: acceptable and room air  Hydration status: acceptable    Comments: An Anesthesiologist personally participated in the most demanding procedures (including induction and emergence if applicable) in the anesthesia plan, monitored the course of anesthesia administration at frequent intervals and remained physically present and available for immediate diagnosis and treatment of emergencies.

## 2022-09-09 LAB
CYTO UR: NORMAL
LAB AP CASE REPORT: NORMAL
LAB AP CLINICAL INFORMATION: NORMAL
PATH REPORT.FINAL DX SPEC: NORMAL
PATH REPORT.GROSS SPEC: NORMAL

## 2022-09-13 ENCOUNTER — TELEPHONE (OUTPATIENT)
Dept: SURGERY | Facility: CLINIC | Age: 47
End: 2022-09-13

## 2022-09-22 ENCOUNTER — OFFICE VISIT (OUTPATIENT)
Dept: SURGERY | Facility: CLINIC | Age: 47
End: 2022-09-22

## 2022-09-22 VITALS — BODY MASS INDEX: 32.3 KG/M2 | HEIGHT: 66 IN | HEART RATE: 86 BPM | WEIGHT: 201 LBS

## 2022-09-22 DIAGNOSIS — Z98.890 STATUS POST COLONOSCOPY WITH POLYPECTOMY: Primary | ICD-10-CM

## 2022-09-22 DIAGNOSIS — K63.5 JUVENILE POLYP OF COLON: ICD-10-CM

## 2022-09-22 PROCEDURE — 99212 OFFICE O/P EST SF 10 MIN: CPT | Performed by: NURSE PRACTITIONER

## 2022-09-22 NOTE — PROGRESS NOTES
Chief Complaint: Post-op Follow-up    Subjective      Follow-up visit       History of Present Illness  Katy Denise is a 47 y.o. female presents to [facility] for follow-up visit.    Patient presents today for follow-up visit after undergoing colonoscopy on 9/8/2022 performed by Dr. Keshawn Yates.    Patient was with a 6 mm juvenile retention of the transverse colon per colonoscopy/pathology.  Resection and retrieval of this polyp was complete.    Pathology:  Clinical Information    Screening for malignant neoplasm of colon  Family history of colon cancer   Final Diagnosis   Transverse colon polyp, biopsy:                - Juvenile (retention) polyp      Electronically signed by Marry Nuñez MD          Objective     Past Medical History:   Diagnosis Date   • Allergic rhinitis due to allergen    • Anxiety disorder    • Arthritis    • Diabetes mellitus, type 2 (HCC)    • GERD (gastroesophageal reflux disease)    • Hyperlipidemia    • Migraine        Past Surgical History:   Procedure Laterality Date   • CARPAL TUNNEL RELEASE Bilateral    • COLONOSCOPY     • COLONOSCOPY N/A 9/8/2022    Procedure: COLONOSCOPY with cold snare polypectomy;  Surgeon: Keshawn Yates MD;  Location: [facility] ENDOSCOPY;  Service: General;  Laterality: N/A;  colon polyp   • ENDOSCOPY     • HYSTERECTOMY     • LUMBAR SPINE SURGERY      Microdiscectomy of thoracic or lumbar spine   • TUBAL ABDOMINAL LIGATION         Outpatient Medications Marked as Taking for the 9/22/22 encounter (Office Visit) with Marguerite Joshi APRN   Medication Sig Dispense Refill   • busPIRone (BUSPAR) 7.5 MG tablet Take 1 tablet by mouth 2 (Two) Times a Day As Needed (anxiety). 60 tablet 2   • diphenhydrAMINE (BENADRYL) 25 mg capsule Take 25 mg by mouth Every 6 (Six) Hours As Needed.     • EPINEPHrine (EPIPEN) 0.3 MG/0.3ML solution auto-injector injection 0.3 mL.     • escitalopram (LEXAPRO) 20 MG tablet TAKE 1 TABLET BY  "MOUTH DAILY 90 tablet 1   • hyoscyamine (ANASPAZ,LEVSIN) 0.125 MG tablet Take 0.125 mg by mouth Every 6 (Six) Hours As Needed.     • losartan (COZAAR) 25 MG tablet TAKE 1 TABLET BY MOUTH DAILY 90 tablet 1   • metFORMIN (Glucophage) 1000 MG tablet Take 1 tablet by mouth 2 (Two) Times a Day With Meals. (Patient taking differently: Take 1,000 mg by mouth 2 (Two) Times a Day With Meals. Hold wed night and thurs am) 180 tablet 1   • propranolol LA (INDERAL LA) 80 MG 24 hr capsule TAKE 1 CAPSULE BY MOUTH DAILY 90 capsule 1   • rosuvastatin (CRESTOR) 5 MG tablet Take 1 tablet by mouth Every Night. 90 tablet 1   • Semaglutide,0.25 or 0.5MG/DOS, (OZEMPIC) 2 MG/1.5ML solution pen-injector Inject 0.5 mg under the skin into the appropriate area as directed 1 (One) Time Per Week. 3 pen 1       No Known Allergies     Family History   Problem Relation Age of Onset   • Stroke Other    • Heart disease Other    • Liver disease Other         cirrhosis   • Diabetes Other    • Malig Hyperthermia Neg Hx        Social History     Socioeconomic History   • Marital status:    Tobacco Use   • Smoking status: Former Smoker     Packs/day: 0.25     Years: 15.00     Pack years: 3.75     Quit date:      Years since quittin.7   • Smokeless tobacco: Never Used   • Tobacco comment: off and on for 30 years; .23ppd   Vaping Use   • Vaping Use: Never used   Substance and Sexual Activity   • Alcohol use: Yes     Comment: occ   • Drug use: Never   • Sexual activity: Defer       Review of Systems   Constitutional: Negative for chills and fever.   Gastrointestinal: Negative for abdominal distention, abdominal pain, anal bleeding, blood in stool, constipation, diarrhea and rectal pain.        Vital Signs:   Pulse 86   Ht 167.6 cm (66\")   Wt 91.2 kg (201 lb)   BMI 32.44 kg/m²      Physical Exam  Vitals and nursing note reviewed.   Constitutional:       General: She is not in acute distress.     Appearance: Normal appearance.   HENT:      " Head: Normocephalic.   Cardiovascular:      Rate and Rhythm: Normal rate.   Pulmonary:      Effort: Pulmonary effort is normal.      Breath sounds: No stridor. No wheezing.   Abdominal:      Palpations: Abdomen is soft.      Tenderness: There is no guarding.   Musculoskeletal:         General: No deformity. Normal range of motion.   Skin:     General: Skin is warm and dry.      Coloration: Skin is not jaundiced.   Neurological:      General: No focal deficit present.      Mental Status: She is alert and oriented to person, place, and time.   Psychiatric:         Mood and Affect: Mood normal.         Thought Content: Thought content normal.          Result Review :{Labs  Result Review  Imaging  Med Tab  Media :23}          []  Laboratory  []  Radiology  [x]  Pathology  []  Microbiology  []  EKG/Telemetry   []  Cardiology/Vascular   [x]  Old records  Today I reviewed Dr. Yates's colonoscopy and pathology.     Assessment and Plan    Diagnoses and all orders for this visit:    1. Status post colonoscopy with polypectomy (Primary)    2. Juvenile polyp of colon        Follow Up   Return for Rescreen colon In 5 years; follow-up in the interim as needed.     Avoid high fat diets    Increase fiber intake    Per AGA guidelines patient will follow-up for colonoscopy surveillance as directed.      Patient was given instructions and counseling regarding her condition or for health maintenance advice. Please see specific information pulled into the AVS if appropriate.

## 2022-11-08 DIAGNOSIS — E78.2 MIXED HYPERLIPIDEMIA: ICD-10-CM

## 2022-11-08 DIAGNOSIS — E11.69 TYPE 2 DIABETES MELLITUS WITH OTHER SPECIFIED COMPLICATION, WITHOUT LONG-TERM CURRENT USE OF INSULIN: ICD-10-CM

## 2022-11-08 DIAGNOSIS — I10 ESSENTIAL HYPERTENSION: ICD-10-CM

## 2022-11-08 RX ORDER — PROPRANOLOL HYDROCHLORIDE 80 MG/1
80 CAPSULE, EXTENDED RELEASE ORAL DAILY
Qty: 90 CAPSULE | Refills: 1 | OUTPATIENT
Start: 2022-11-08

## 2022-11-08 RX ORDER — ROSUVASTATIN CALCIUM 5 MG/1
5 TABLET, COATED ORAL NIGHTLY
Qty: 90 TABLET | OUTPATIENT
Start: 2022-11-08

## 2022-11-08 RX ORDER — ROSUVASTATIN CALCIUM 5 MG/1
5 TABLET, COATED ORAL NIGHTLY
Qty: 30 TABLET | Refills: 1 | Status: SHIPPED | OUTPATIENT
Start: 2022-11-08 | End: 2023-02-27

## 2022-11-09 ENCOUNTER — LAB (OUTPATIENT)
Dept: LAB | Facility: HOSPITAL | Age: 47
End: 2022-11-09

## 2022-11-09 DIAGNOSIS — E11.69 TYPE 2 DIABETES MELLITUS WITH OTHER SPECIFIED COMPLICATION, WITHOUT LONG-TERM CURRENT USE OF INSULIN: ICD-10-CM

## 2022-11-09 LAB — HBA1C MFR BLD: 6.9 % (ref 4.8–5.6)

## 2022-11-09 PROCEDURE — 83036 HEMOGLOBIN GLYCOSYLATED A1C: CPT

## 2022-11-09 PROCEDURE — 36415 COLL VENOUS BLD VENIPUNCTURE: CPT

## 2022-11-17 ENCOUNTER — TELEPHONE (OUTPATIENT)
Dept: FAMILY MEDICINE CLINIC | Facility: CLINIC | Age: 47
End: 2022-11-17

## 2022-11-17 DIAGNOSIS — E11.69 TYPE 2 DIABETES MELLITUS WITH OTHER SPECIFIED COMPLICATION, WITHOUT LONG-TERM CURRENT USE OF INSULIN: Primary | ICD-10-CM

## 2022-11-17 NOTE — TELEPHONE ENCOUNTER
----- Message from DELIO Gutierrez sent at 11/11/2022  8:28 AM EST -----  Lets increase ozempic to 1mg q week if tolerate ok after one month have pt call for new script for 2mg q week and then lets recheck a1c in 3 months  ----- Message -----  From: Rosalba Mercado  Sent: 11/10/2022   4:43 PM EST  To: DELIO Gutierrez    Pt is is on Metformin 1000 mg BID. Ozempic is 0.5 mg once wkly.

## 2022-12-01 ENCOUNTER — TELEPHONE (OUTPATIENT)
Dept: FAMILY MEDICINE CLINIC | Facility: CLINIC | Age: 47
End: 2022-12-01

## 2022-12-01 DIAGNOSIS — E11.69 TYPE 2 DIABETES MELLITUS WITH OTHER SPECIFIED COMPLICATION, WITHOUT LONG-TERM CURRENT USE OF INSULIN: ICD-10-CM

## 2022-12-01 DIAGNOSIS — I10 ESSENTIAL HYPERTENSION: ICD-10-CM

## 2022-12-01 NOTE — TELEPHONE ENCOUNTER
Incoming Refill Request      Medication requested (name and dose)  metFORMIN (GLUCOPHAGE) 1000 MG tablet  propranolol LA (INDERAL LA) 80 MG 24 hr capsule  Semaglutide,0.25 or 0.5MG/DOS, (OZEMPIC) 1MG/1.5ML solution pen-injector  Pharmacy where request should be sent: [facility] #[id number]    Additional details provided by patient: THE OZEMPIC SHE IS READY FOR THE UPPER DOSE 1MG AND THE REST OF MEDICATION JUST NEED THE REFILLS    Best call back number: [phone]    Does the patient have less than a 3 day supply:  [] Yes  [x] No    Sherlyn Allen  12/01/22, 15:34 EST    {

## 2022-12-02 DIAGNOSIS — E11.69 TYPE 2 DIABETES MELLITUS WITH OTHER SPECIFIED COMPLICATION, WITHOUT LONG-TERM CURRENT USE OF INSULIN: ICD-10-CM

## 2022-12-02 RX ORDER — PROPRANOLOL HYDROCHLORIDE 80 MG/1
80 CAPSULE, EXTENDED RELEASE ORAL DAILY
Qty: 90 CAPSULE | Refills: 1 | Status: SHIPPED | OUTPATIENT
Start: 2022-12-02

## 2022-12-06 ENCOUNTER — TELEPHONE (OUTPATIENT)
Dept: FAMILY MEDICINE CLINIC | Facility: CLINIC | Age: 47
End: 2022-12-06

## 2022-12-06 DIAGNOSIS — E11.69 TYPE 2 DIABETES MELLITUS WITH OTHER SPECIFIED COMPLICATION, WITHOUT LONG-TERM CURRENT USE OF INSULIN: ICD-10-CM

## 2022-12-06 NOTE — TELEPHONE ENCOUNTER
Incoming Refill Request      Medication requested (name and dose):   Semaglutide,0.25 or 0.5MG/DOS, (OZEMPIC) 1MG/1.5ML solution pen-injector  Pharmacy where request should be sent: [facility] #[id number]    Additional details provided by patient: THE PATIENT NEEDS THE 1MG SENT OVER TO THE PHARMACY SHE IS OUT OF THE 1MG     Best call back number: [phone]   Does the patient have less than a 3 day supply:  [] Yes  [x] No    Sherlyn Allen  12/06/22, 15:16 EST

## 2022-12-12 ENCOUNTER — OFFICE VISIT (OUTPATIENT)
Dept: FAMILY MEDICINE CLINIC | Facility: CLINIC | Age: 47
End: 2022-12-12

## 2022-12-12 VITALS
WEIGHT: 204 LBS | HEIGHT: 66 IN | DIASTOLIC BLOOD PRESSURE: 59 MMHG | BODY MASS INDEX: 32.78 KG/M2 | SYSTOLIC BLOOD PRESSURE: 106 MMHG | OXYGEN SATURATION: 97 % | HEART RATE: 78 BPM

## 2022-12-12 DIAGNOSIS — G43.901 MIGRAINE WITH STATUS MIGRAINOSUS, NOT INTRACTABLE, UNSPECIFIED MIGRAINE TYPE: ICD-10-CM

## 2022-12-12 DIAGNOSIS — R11.0 NAUSEA: ICD-10-CM

## 2022-12-12 DIAGNOSIS — R10.11 RUQ PAIN: Primary | ICD-10-CM

## 2022-12-12 PROCEDURE — 99214 OFFICE O/P EST MOD 30 MIN: CPT | Performed by: NURSE PRACTITIONER

## 2022-12-12 RX ORDER — ONDANSETRON 4 MG/1
4 TABLET, FILM COATED ORAL EVERY 8 HOURS PRN
Qty: 24 TABLET | Refills: 1 | Status: SHIPPED | OUTPATIENT
Start: 2022-12-12

## 2022-12-12 RX ORDER — SUMATRIPTAN 50 MG/1
50 TABLET, FILM COATED ORAL
COMMUNITY
End: 2022-12-12

## 2022-12-12 RX ORDER — ERENUMAB-AOOE 140 MG/ML
140 INJECTION, SOLUTION SUBCUTANEOUS
Qty: 3 ML | Refills: 1 | Status: SHIPPED | OUTPATIENT
Start: 2022-12-12 | End: 2023-01-03 | Stop reason: CLARIF

## 2022-12-12 RX ORDER — RIMEGEPANT SULFATE 75 MG/75MG
75 TABLET, ORALLY DISINTEGRATING ORAL DAILY PRN
Qty: 16 TABLET | Refills: 1 | Status: SHIPPED | OUTPATIENT
Start: 2022-12-12

## 2022-12-12 NOTE — PROGRESS NOTES
Chief Complaint  Abdominal Cramping, Migraine, and Abdominal Pain    Subjective            Katy Densie presents to [facility]  History of Present Illness  Pt has c/o RUQ pain/fullness feeling. Pt states she has had issues with this for several years, but over the last 2 wks, the symptoms have worsened. PT states she feels nauseated quite often and feels it swells in the RUQ. Pt has had had U/S, gastric emptying, and HIDA scan in 2018 and was WNL. Pt is not currently seeing gastro. She states the nausea is worse after eating.    Pt has c/o migraines. Pt states she has been taking her imitrex 2-3x per wk recently. Pt states the migraines have been getting worse since 2022. Pt is currently on propranolol for migraines. Pt has tried maxalt in the past and states this did not help. She states the imitrex makes her very tired when she takes it and usually has to take 2.        Past Medical History:   Diagnosis Date   • Allergic rhinitis due to allergen    • Anxiety disorder    • Arthritis    • Diabetes mellitus, type 2 (HCC)    • GERD (gastroesophageal reflux disease)    • Hyperlipidemia    • Migraine        No Known Allergies     Past Surgical History:   Procedure Laterality Date   • CARPAL TUNNEL RELEASE Bilateral    • COLONOSCOPY     • COLONOSCOPY N/A 2022    Procedure: COLONOSCOPY with cold snare polypectomy;  Surgeon: Keshawn Yates MD;  Location: [facility] ENDOSCOPY;  Service: General;  Laterality: N/A;  colon polyp   • ENDOSCOPY     • HYSTERECTOMY     • LUMBAR SPINE SURGERY      Microdiscectomy of thoracic or lumbar spine   • TUBAL ABDOMINAL LIGATION          Social History     Tobacco Use   • Smoking status: Former     Packs/day: 0.25     Years: 15.00     Pack years: 3.75     Types: Cigarettes     Quit date:      Years since quittin.9   • Smokeless tobacco: Never   • Tobacco comments:     off and on for 30 years; .23ppd   Substance Use Topics   • Alcohol  "use: Yes     Comment: occ       Family History   Problem Relation Age of Onset   • Stroke Other    • Heart disease Other    • Liver disease Other         cirrhosis   • Diabetes Other    • Malig Hyperthermia Neg Hx         Current Outpatient Medications on File Prior to Visit   Medication Sig   • busPIRone (BUSPAR) 7.5 MG tablet Take 1 tablet by mouth 2 (Two) Times a Day As Needed (anxiety).   • diphenhydrAMINE (BENADRYL) 25 mg capsule Take 25 mg by mouth Every 6 (Six) Hours As Needed.   • EPINEPHrine (EPIPEN) 0.3 MG/0.3ML solution auto-injector injection 0.3 mL.   • escitalopram (LEXAPRO) 20 MG tablet TAKE 1 TABLET BY MOUTH DAILY   • hyoscyamine (ANASPAZ,LEVSIN) 0.125 MG tablet Take 0.125 mg by mouth Every 6 (Six) Hours As Needed.   • losartan (COZAAR) 25 MG tablet TAKE 1 TABLET BY MOUTH DAILY   • metFORMIN (GLUCOPHAGE) 1000 MG tablet Take 1 tablet by mouth 2 (Two) Times a Day With Meals.   • propranolol LA (INDERAL LA) 80 MG 24 hr capsule Take 1 capsule by mouth Daily.   • rosuvastatin (CRESTOR) 5 MG tablet TAKE 1 TABLET BY MOUTH EVERY NIGHT   • Semaglutide, 1 MG/DOSE, (OZEMPIC) 2 MG/1.5ML solution pen-injector Inject 1 mg under the skin into the appropriate area as directed 1 (One) Time Per Week.   • [DISCONTINUED] SUMAtriptan (IMITREX) 50 MG tablet Take 50 mg by mouth Every 2 (Two) Hours As Needed for Migraine. Take one tablet at onset of headache. May repeat dose one time in 2 hours if headache not relieved.     No current facility-administered medications on file prior to visit.       Health Maintenance Due   Topic Date Due   • COVID-19 Vaccine (5 - Booster) 07/12/2022   • INFLUENZA VACCINE  Never done   • DIABETIC FOOT EXAM  08/02/2022       Objective     /59   Pulse 78   Ht 167.6 cm (66\")   Wt 92.5 kg (204 lb)   SpO2 97%   BMI 32.93 kg/m²       Physical Exam  Constitutional:       General: She is not in acute distress.     Appearance: Normal appearance. She is not ill-appearing.   HENT:      Head: " Normocephalic and atraumatic.      Right Ear: Tympanic membrane, ear canal and external ear normal.      Left Ear: Tympanic membrane, ear canal and external ear normal.   Cardiovascular:      Rate and Rhythm: Normal rate and regular rhythm.      Heart sounds: Normal heart sounds. No murmur heard.  Pulmonary:      Effort: Pulmonary effort is normal. No respiratory distress.      Breath sounds: Normal breath sounds.   Chest:      Chest wall: No tenderness.   Abdominal:      General: Abdomen is flat. Bowel sounds are normal. There is no distension.      Palpations: Abdomen is soft. There is no mass.      Tenderness: There is abdominal tenderness in the right upper quadrant. There is no guarding or rebound. Negative signs include Mccarthy's sign, Rovsing's sign, McBurney's sign, psoas sign and obturator sign.   Musculoskeletal:         General: No swelling or tenderness. Normal range of motion.      Cervical back: Normal range of motion and neck supple.   Skin:     General: Skin is warm and dry.      Findings: No rash.   Neurological:      General: No focal deficit present.      Mental Status: She is alert and oriented to person, place, and time. Mental status is at baseline.      Gait: Gait normal.   Psychiatric:         Mood and Affect: Mood normal.         Behavior: Behavior normal.         Thought Content: Thought content normal.         Judgment: Judgment normal.           Result Review :                           Assessment and Plan        Diagnoses and all orders for this visit:    1. RUQ pain (Primary)  -     US Gallbladder; Future  -     NM HIDA Scan With Pharmacological Intervention; Future  -     Ambulatory Referral to Gastroenterology  -     ondansetron (Zofran) 4 MG tablet; Take 1 tablet by mouth Every 8 (Eight) Hours As Needed for Nausea or Vomiting.  Dispense: 24 tablet; Refill: 1    2. Migraine with status migrainosus, not intractable, unspecified migraine type  Comments:  advised pt to keep a HA diary and  bring to f/u appt in 3 months  Orders:  -     Erenumab-aooe (Aimovig) 140 MG/ML prefilled syringe; Inject 1 mL under the skin into the appropriate area as directed Every 30 (Thirty) Days.  Dispense: 3 mL; Refill: 1  -     Rimegepant Sulfate (Nurtec) 75 MG tablet dispersible tablet; Take 1 tablet by mouth Daily As Needed (migraine).  Dispense: 16 tablet; Refill: 1    3. Nausea  -     ondansetron (Zofran) 4 MG tablet; Take 1 tablet by mouth Every 8 (Eight) Hours As Needed for Nausea or Vomiting.  Dispense: 24 tablet; Refill: 1          I spent 30 minutes caring for Katy on this date of service. This time includes time spent by me in the following activities:preparing for the visit, obtaining and/or reviewing a separately obtained history, performing a medically appropriate examination and/or evaluation , counseling and educating the patient/family/caregiver, ordering medications, tests, or procedures and documenting information in the medical record    Follow Up     Return in about 3 months (around 3/12/2023), or if symptoms worsen or fail to improve.    Patient was given instructions and counseling regarding her condition or for health maintenance advice. Please see specific information pulled into the AVS if appropriate.     Katy Denise  reports that she quit smoking about 13 years ago. Her smoking use included cigarettes. She has a 3.75 pack-year smoking history. She has never used smokeless tobacco..

## 2022-12-14 ENCOUNTER — TELEPHONE (OUTPATIENT)
Dept: FAMILY MEDICINE CLINIC | Facility: CLINIC | Age: 47
End: 2022-12-14
Payer: COMMERCIAL

## 2022-12-14 NOTE — TELEPHONE ENCOUNTER
Aimovig was denied by pt's insurance.    Alternatives that are on formulary are Ajovy, Emgality    Please advise.

## 2022-12-28 ENCOUNTER — TELEPHONE (OUTPATIENT)
Dept: FAMILY MEDICINE CLINIC | Facility: CLINIC | Age: 47
End: 2022-12-28

## 2022-12-28 DIAGNOSIS — G43.801 OTHER MIGRAINE WITH STATUS MIGRAINOSUS, NOT INTRACTABLE: Primary | ICD-10-CM

## 2022-12-28 NOTE — TELEPHONE ENCOUNTER
Incoming Refill Request      Medication requested (name and dose):   Erenumab-aooe (Aimovig) 140 MG/ML prefilled syringe  Rimegepant Sulfate (Nurtec) 75 MG tablet dispersible tablet    Pharmacy where request should be sent: [facility]    Additional details provided by patient: PATIENT STATED THAT HER MEDICATIONS IS NOT GOING THROUGH   THE INSURANCE.    Best call back number: [phone]     Does the patient have less than a 3 day supply:  [] Yes  [x] No    Sherlyn Allen  12/28/22, 15:49 EST    {

## 2023-01-03 DIAGNOSIS — F41.9 ANXIETY: ICD-10-CM

## 2023-01-03 RX ORDER — BUSPIRONE HYDROCHLORIDE 7.5 MG/1
TABLET ORAL
Qty: 60 TABLET | Refills: 2 | Status: SHIPPED | OUTPATIENT
Start: 2023-01-03

## 2023-01-05 ENCOUNTER — HOSPITAL ENCOUNTER (OUTPATIENT)
Dept: ULTRASOUND IMAGING | Facility: HOSPITAL | Age: 48
Discharge: HOME | End: 2023-01-05
Admitting: NURSE PRACTITIONER
Payer: COMMERCIAL

## 2023-01-05 ENCOUNTER — HOSPITAL ENCOUNTER (OUTPATIENT)
Dept: NUCLEAR MEDICINE | Facility: HOSPITAL | Age: 48
Discharge: HOME | End: 2023-01-05
Payer: COMMERCIAL

## 2023-01-05 DIAGNOSIS — R10.11 RUQ PAIN: ICD-10-CM

## 2023-01-05 PROCEDURE — 76705 ECHO EXAM OF ABDOMEN: CPT

## 2023-01-05 PROCEDURE — 78227 HEPATOBIL SYST IMAGE W/DRUG: CPT

## 2023-01-05 PROCEDURE — A9537 TC99M MEBROFENIN: HCPCS | Performed by: NURSE PRACTITIONER

## 2023-01-05 PROCEDURE — 0 TECHNETIUM TC 99M MEBROFENIN KIT: Performed by: NURSE PRACTITIONER

## 2023-01-05 RX ORDER — KIT FOR THE PREPARATION OF TECHNETIUM TC 99M MEBROFENIN 45 MG/10ML
1 INJECTION, POWDER, LYOPHILIZED, FOR SOLUTION INTRAVENOUS
Status: COMPLETED | OUTPATIENT
Start: 2023-01-05 | End: 2023-01-05

## 2023-01-05 RX ADMIN — KIT FOR THE PREPARATION OF TECHNETIUM TC 99M MEBROFENIN 1 DOSE: 45 INJECTION, POWDER, LYOPHILIZED, FOR SOLUTION INTRAVENOUS at 11:50

## 2023-01-22 DIAGNOSIS — F41.9 ANXIETY DISORDER, UNSPECIFIED TYPE: ICD-10-CM

## 2023-01-22 DIAGNOSIS — F33.0 MILD EPISODE OF RECURRENT MAJOR DEPRESSIVE DISORDER: ICD-10-CM

## 2023-01-22 DIAGNOSIS — I10 ESSENTIAL HYPERTENSION: ICD-10-CM

## 2023-01-23 RX ORDER — ESCITALOPRAM OXALATE 20 MG/1
20 TABLET ORAL DAILY
Qty: 90 TABLET | Refills: 1 | Status: SHIPPED | OUTPATIENT
Start: 2023-01-23

## 2023-01-23 RX ORDER — LOSARTAN POTASSIUM 25 MG/1
25 TABLET ORAL DAILY
Qty: 90 TABLET | Refills: 1 | Status: SHIPPED | OUTPATIENT
Start: 2023-01-23

## 2023-02-05 DIAGNOSIS — E11.69 TYPE 2 DIABETES MELLITUS WITH OTHER SPECIFIED COMPLICATION, WITHOUT LONG-TERM CURRENT USE OF INSULIN: ICD-10-CM

## 2023-02-06 RX ORDER — SEMAGLUTIDE 1.34 MG/ML
INJECTION, SOLUTION SUBCUTANEOUS
Qty: 12 ML | Refills: 1 | Status: SHIPPED | OUTPATIENT
Start: 2023-02-06

## 2023-02-07 DIAGNOSIS — G43.701 CHRONIC MIGRAINE WITHOUT AURA WITH STATUS MIGRAINOSUS, NOT INTRACTABLE: Primary | ICD-10-CM

## 2023-02-26 DIAGNOSIS — E78.2 MIXED HYPERLIPIDEMIA: ICD-10-CM

## 2023-02-27 RX ORDER — ROSUVASTATIN CALCIUM 5 MG/1
5 TABLET, COATED ORAL NIGHTLY
Qty: 90 TABLET | Refills: 1 | Status: SHIPPED | OUTPATIENT
Start: 2023-02-27

## 2023-03-16 ENCOUNTER — TRANSCRIBE ORDERS (OUTPATIENT)
Dept: ADMINISTRATIVE | Facility: HOSPITAL | Age: 48
End: 2023-03-16
Payer: COMMERCIAL

## 2023-03-16 DIAGNOSIS — Z12.31 SCREENING MAMMOGRAM, ENCOUNTER FOR: Primary | ICD-10-CM

## 2023-03-25 DIAGNOSIS — E11.69 TYPE 2 DIABETES MELLITUS WITH OTHER SPECIFIED COMPLICATION, WITHOUT LONG-TERM CURRENT USE OF INSULIN: ICD-10-CM

## 2023-04-25 ENCOUNTER — OFFICE VISIT (OUTPATIENT)
Dept: ORTHOPEDIC SURGERY | Facility: CLINIC | Age: 48
End: 2023-04-25
Payer: COMMERCIAL

## 2023-04-25 VITALS — BODY MASS INDEX: 32.78 KG/M2 | HEIGHT: 66 IN | WEIGHT: 204 LBS

## 2023-04-25 DIAGNOSIS — M77.9 TENDONITIS: Primary | ICD-10-CM

## 2023-04-25 RX ORDER — METHYLPREDNISOLONE ACETATE 80 MG/ML
80 INJECTION, SUSPENSION INTRA-ARTICULAR; INTRALESIONAL; INTRAMUSCULAR; SOFT TISSUE
Status: COMPLETED | OUTPATIENT
Start: 2023-04-25 | End: 2023-04-25

## 2023-04-25 RX ORDER — LIDOCAINE HYDROCHLORIDE 10 MG/ML
1 INJECTION, SOLUTION EPIDURAL; INFILTRATION; INTRACAUDAL; PERINEURAL
Status: COMPLETED | OUTPATIENT
Start: 2023-04-25 | End: 2023-04-25

## 2023-04-25 RX ADMIN — METHYLPREDNISOLONE ACETATE 80 MG: 80 INJECTION, SUSPENSION INTRA-ARTICULAR; INTRALESIONAL; INTRAMUSCULAR; SOFT TISSUE at 08:41

## 2023-04-25 RX ADMIN — LIDOCAINE HYDROCHLORIDE 1 ML: 10 INJECTION, SOLUTION EPIDURAL; INFILTRATION; INTRACAUDAL; PERINEURAL at 08:41

## 2023-04-25 NOTE — PATIENT INSTRUCTIONS
Steroid injection given today in office for de Quervain's tendinitis.  She tolerated this procedure well.  Educated that she can receive these injections every 3 to 4 months as needed.  Home exercises for de Quervain's sent home with patient.  Encouraged to continue bracing.  She elects to follow-up as needed if symptoms arise again.

## 2023-04-25 NOTE — PROGRESS NOTES
"Chief Complaint  Follow-up of the Right Thumb    Subjective      Katy Denise presents to [facility] for de Quervain's tendinitis.  States that she has been struggling with this for quite some time.  She had a last steroid injection on 810/22 with Dr. Morales.  She states that this worked very well and she is just now feeling symptoms again in the last 2 weeks.  In the past she has tried braces and taking Tylenol as needed and this has not helped.  States that she cannot take NSAIDs.  She wishes to receive another steroid injection today.    Objective   Allergies   Allergen Reactions   • Tomato Other (See Comments)   • Latex Rash   • Penicillins Unknown - Low Severity     \"My mother was very allergic.\"  \"My mother was very allergic.\"         Vital Signs:   Ht 167.6 cm (66\")   Wt 92.5 kg (204 lb)   BMI 32.93 kg/m²       Physical Exam    Constitutional: Awake, alert. Well nourished appearance.    Integumentary: Warm, dry, intact. No obvious rashes.    HENT: Atraumatic, normocephalic.   Respiratory: Non labored respirations .   Cardiovascular: Intact peripheral pulses.    Psychiatric: Normal mood and affect. A&O X3    Ortho Exam  Right wrist: Patient has full range of motion of the right wrist flexion, extension, supination and pronation.  She has full sensation of her fingers and capillary refill time is brisk. Positive Finkelstein.  Tender to the touch at the base of the thumb.    Imaging Results (Most Recent)     None           Small Joint RIGHT THUMB  Consent given by: patient  Site marked: site marked  Timeout: Immediately prior to procedure a time out was called to verify the correct patient, procedure, equipment, support staff and site/side marked as required   Supporting Documentation  Indications: pain   Procedure Details  Location: thumb (RIGHT) -   Preparation: Patient was prepped and draped in the usual sterile fashion  Needle gauge: 23G.  Medications administered: 1 mL " lidocaine PF 1% 1 %; 80 mg methylPREDNISolone acetate 80 MG/ML  Patient tolerance: patient tolerated the procedure well with no immediate complications              Assessment and Plan   Problem List Items Addressed This Visit    None  Visit Diagnoses     Tendonitis- DeQuervaines    -  Primary          Follow Up   Return if symptoms worsen or fail to improve.    Patient is a former smoker, did not discuss options for smoking cessation.     Educated on risk of elevated BMI. Encouraged to follow up with PCP.    Social History     Socioeconomic History   • Marital status:    Tobacco Use   • Smoking status: Former     Packs/day: 0.25     Years: 15.00     Pack years: 3.75     Types: Cigarettes     Quit date:      Years since quittin.3   • Smokeless tobacco: Never   • Tobacco comments:     off and on for 30 years; .23ppd   Vaping Use   • Vaping Use: Never used   Substance and Sexual Activity   • Alcohol use: Yes     Comment: occ   • Drug use: Never   • Sexual activity: Defer       Patient Instructions   Steroid injection given today in office for de Quervain's tendinitis.  She tolerated this procedure well.  Educated that she can receive these injections every 3 to 4 months as needed.  Home exercises for de Quervain's sent home with patient.  Encouraged to continue bracing.  She elects to follow-up as needed if symptoms arise again.    Patient was given instructions and counseling regarding her condition or for health maintenance advice. Please see specific information pulled into the AVS if appropriate.

## 2023-04-26 PROBLEM — E11.9 TYPE 2 DIABETES MELLITUS WITHOUT COMPLICATION, WITH LONG-TERM CURRENT USE OF INSULIN: Status: ACTIVE | Noted: 2017-11-28

## 2023-04-26 PROBLEM — Z79.4 TYPE 2 DIABETES MELLITUS WITHOUT COMPLICATION, WITH LONG-TERM CURRENT USE OF INSULIN: Status: ACTIVE | Noted: 2017-11-28

## 2023-04-26 PROBLEM — G43.009 MIGRAINE WITHOUT AURA AND WITHOUT STATUS MIGRAINOSUS, NOT INTRACTABLE: Status: ACTIVE | Noted: 2017-11-28

## 2023-04-27 ENCOUNTER — OFFICE VISIT (OUTPATIENT)
Dept: FAMILY MEDICINE CLINIC | Facility: CLINIC | Age: 48
End: 2023-04-27
Payer: COMMERCIAL

## 2023-04-27 ENCOUNTER — LAB (OUTPATIENT)
Dept: LAB | Facility: HOSPITAL | Age: 48
End: 2023-04-27
Payer: COMMERCIAL

## 2023-04-27 VITALS
SYSTOLIC BLOOD PRESSURE: 132 MMHG | HEIGHT: 66 IN | WEIGHT: 193 LBS | DIASTOLIC BLOOD PRESSURE: 71 MMHG | OXYGEN SATURATION: 100 % | HEART RATE: 72 BPM | BODY MASS INDEX: 31.02 KG/M2

## 2023-04-27 DIAGNOSIS — F41.9 ANXIETY: ICD-10-CM

## 2023-04-27 DIAGNOSIS — I10 PRIMARY HYPERTENSION: ICD-10-CM

## 2023-04-27 DIAGNOSIS — Z00.00 ANNUAL PHYSICAL EXAM: ICD-10-CM

## 2023-04-27 DIAGNOSIS — E11.9 TYPE 2 DIABETES MELLITUS WITHOUT COMPLICATION, WITHOUT LONG-TERM CURRENT USE OF INSULIN: ICD-10-CM

## 2023-04-27 DIAGNOSIS — E78.5 HYPERLIPIDEMIA, UNSPECIFIED HYPERLIPIDEMIA TYPE: ICD-10-CM

## 2023-04-27 DIAGNOSIS — F32.A DEPRESSION, UNSPECIFIED DEPRESSION TYPE: ICD-10-CM

## 2023-04-27 DIAGNOSIS — G43.101 MIGRAINE WITH AURA AND WITH STATUS MIGRAINOSUS, NOT INTRACTABLE: ICD-10-CM

## 2023-04-27 DIAGNOSIS — Z13.29 SCREENING FOR THYROID DISORDER: ICD-10-CM

## 2023-04-27 DIAGNOSIS — R11.0 NAUSEA: ICD-10-CM

## 2023-04-27 DIAGNOSIS — Z00.00 ANNUAL PHYSICAL EXAM: Primary | ICD-10-CM

## 2023-04-27 LAB
ALBUMIN SERPL-MCNC: 4.6 G/DL (ref 3.5–5.2)
ALBUMIN UR-MCNC: 1.9 MG/DL
ALBUMIN/GLOB SERPL: 1.7 G/DL
ALP SERPL-CCNC: 42 U/L (ref 39–117)
ALT SERPL W P-5'-P-CCNC: 23 U/L (ref 1–33)
ANION GAP SERPL CALCULATED.3IONS-SCNC: 11 MMOL/L (ref 5–15)
AST SERPL-CCNC: 19 U/L (ref 1–32)
BASOPHILS # BLD AUTO: 0.05 10*3/MM3 (ref 0–0.2)
BASOPHILS NFR BLD AUTO: 0.8 % (ref 0–1.5)
BILIRUB SERPL-MCNC: 0.4 MG/DL (ref 0–1.2)
BUN SERPL-MCNC: 13 MG/DL (ref 6–20)
BUN/CREAT SERPL: 28.9 (ref 7–25)
CALCIUM SPEC-SCNC: 9 MG/DL (ref 8.6–10.5)
CHLORIDE SERPL-SCNC: 102 MMOL/L (ref 98–107)
CHOLEST SERPL-MCNC: 124 MG/DL (ref 0–200)
CO2 SERPL-SCNC: 26 MMOL/L (ref 22–29)
CREAT SERPL-MCNC: 0.45 MG/DL (ref 0.57–1)
DEPRECATED RDW RBC AUTO: 44.7 FL (ref 37–54)
EGFRCR SERPLBLD CKD-EPI 2021: 120.3 ML/MIN/1.73
EOSINOPHIL # BLD AUTO: 0.09 10*3/MM3 (ref 0–0.4)
EOSINOPHIL NFR BLD AUTO: 1.4 % (ref 0.3–6.2)
ERYTHROCYTE [DISTWIDTH] IN BLOOD BY AUTOMATED COUNT: 13.1 % (ref 12.3–15.4)
GLOBULIN UR ELPH-MCNC: 2.7 GM/DL
GLUCOSE SERPL-MCNC: 116 MG/DL (ref 65–99)
HBA1C MFR BLD: 6.2 % (ref 4.8–5.6)
HCT VFR BLD AUTO: 41.7 % (ref 34–46.6)
HDLC SERPL-MCNC: 65 MG/DL (ref 40–60)
HGB BLD-MCNC: 13.9 G/DL (ref 12–15.9)
IMM GRANULOCYTES # BLD AUTO: 0.02 10*3/MM3 (ref 0–0.05)
IMM GRANULOCYTES NFR BLD AUTO: 0.3 % (ref 0–0.5)
LDLC SERPL CALC-MCNC: 37 MG/DL (ref 0–100)
LDLC/HDLC SERPL: 0.51 {RATIO}
LYMPHOCYTES # BLD AUTO: 2.4 10*3/MM3 (ref 0.7–3.1)
LYMPHOCYTES NFR BLD AUTO: 37.1 % (ref 19.6–45.3)
MCH RBC QN AUTO: 31.2 PG (ref 26.6–33)
MCHC RBC AUTO-ENTMCNC: 33.3 G/DL (ref 31.5–35.7)
MCV RBC AUTO: 93.7 FL (ref 79–97)
MONOCYTES # BLD AUTO: 0.58 10*3/MM3 (ref 0.1–0.9)
MONOCYTES NFR BLD AUTO: 9 % (ref 5–12)
NEUTROPHILS NFR BLD AUTO: 3.33 10*3/MM3 (ref 1.7–7)
NEUTROPHILS NFR BLD AUTO: 51.4 % (ref 42.7–76)
NRBC BLD AUTO-RTO: 0 /100 WBC (ref 0–0.2)
PLATELET # BLD AUTO: 245 10*3/MM3 (ref 140–450)
PMV BLD AUTO: 11.3 FL (ref 6–12)
POTASSIUM SERPL-SCNC: 4 MMOL/L (ref 3.5–5.2)
PROT SERPL-MCNC: 7.3 G/DL (ref 6–8.5)
RBC # BLD AUTO: 4.45 10*6/MM3 (ref 3.77–5.28)
SODIUM SERPL-SCNC: 139 MMOL/L (ref 136–145)
T4 FREE SERPL-MCNC: 1.04 NG/DL (ref 0.93–1.7)
TRIGL SERPL-MCNC: 128 MG/DL (ref 0–150)
TSH SERPL DL<=0.05 MIU/L-ACNC: 2.29 UIU/ML (ref 0.27–4.2)
VLDLC SERPL-MCNC: 22 MG/DL (ref 5–40)
WBC NRBC COR # BLD: 6.47 10*3/MM3 (ref 3.4–10.8)

## 2023-04-27 PROCEDURE — 36415 COLL VENOUS BLD VENIPUNCTURE: CPT

## 2023-04-27 PROCEDURE — 82043 UR ALBUMIN QUANTITATIVE: CPT

## 2023-04-27 PROCEDURE — 80061 LIPID PANEL: CPT

## 2023-04-27 PROCEDURE — 80050 GENERAL HEALTH PANEL: CPT

## 2023-04-27 PROCEDURE — 84439 ASSAY OF FREE THYROXINE: CPT

## 2023-04-27 PROCEDURE — 83036 HEMOGLOBIN GLYCOSYLATED A1C: CPT

## 2023-04-27 RX ORDER — ONDANSETRON 4 MG/1
4 TABLET, FILM COATED ORAL EVERY 8 HOURS PRN
Qty: 24 TABLET | Refills: 1 | Status: SHIPPED | OUTPATIENT
Start: 2023-04-27

## 2023-04-27 NOTE — PROGRESS NOTES
"Chief Complaint  Annual Exam, Diabetes, Hyperlipidemia, Hypertension, Anxiety, and Migraine, depression    Subjective            Katy Denise presents to [facility]  History of Present Illness  Pt is an annual CPE for DM, migraines, HLD, HTN, and anxiety. No issues or concerns at this time. She requests a refill on Zofran for prn nausea.    Pt is due labs.    Pt is due DM foot exam, will perform today.    Pap: no longer, hysterectomy.    Colon: 22    Mammogram: scheduled for  23        Past Medical History:   Diagnosis Date   • Allergic rhinitis due to allergen    • Anxiety disorder    • Arthritis    • Diabetes mellitus, type 2    • GERD (gastroesophageal reflux disease)    • Hyperlipidemia    • Migraine        Allergies   Allergen Reactions   • Tomato Other (See Comments)   • Latex Rash   • Penicillins Unknown - Low Severity     \"My mother was very allergic.\"  \"My mother was very allergic.\"          Past Surgical History:   Procedure Laterality Date   • CARPAL TUNNEL RELEASE Bilateral    • COLONOSCOPY     • COLONOSCOPY N/A 2022    Procedure: COLONOSCOPY with cold snare polypectomy;  Surgeon: Keshawn Yates MD;  Location: [facility];  Service: General;  Laterality: N/A;  colon polyp   • ENDOSCOPY     • HYSTERECTOMY     • LUMBAR SPINE SURGERY      Microdiscectomy of thoracic or lumbar spine   • TUBAL ABDOMINAL LIGATION          Social History     Tobacco Use   • Smoking status: Former     Packs/day: 0.25     Years: 15.00     Pack years: 3.75     Types: Cigarettes     Quit date:      Years since quittin.3   • Smokeless tobacco: Never   • Tobacco comments:     off and on for 30 years; .23ppd   Substance Use Topics   • Alcohol use: Yes     Comment: occ       Family History   Problem Relation Age of Onset   • Stroke Other    • Heart disease Other    • Liver disease Other         cirrhosis   • Diabetes Other    • Malig Hyperthermia Neg Hx     " "    Current Outpatient Medications on File Prior to Visit   Medication Sig   • busPIRone (BUSPAR) 7.5 MG tablet TAKE 1 TABLET BY MOUTH TWICE DAILY AS NEEDED FOR ANXIETY   • diphenhydrAMINE (BENADRYL) 25 mg capsule Take 1 capsule by mouth Every 6 (Six) Hours As Needed.   • EPINEPHrine (EPIPEN) 0.3 MG/0.3ML solution auto-injector injection 0.3 mL.   • Erenumab-aooe (AIMOVIG) 140 MG/ML auto-injector Inject 1 mL under the skin into the appropriate area as directed Every 30 (Thirty) Days.   • escitalopram (LEXAPRO) 20 MG tablet TAKE 1 TABLET BY MOUTH DAILY   • hyoscyamine (ANASPAZ,LEVSIN) 0.125 MG tablet Take 1 tablet by mouth Every 6 (Six) Hours As Needed.   • losartan (COZAAR) 25 MG tablet TAKE 1 TABLET BY MOUTH DAILY   • metFORMIN (GLUCOPHAGE) 1000 MG tablet TAKE 1 TABLET BY MOUTH TWICE DAILY WITH MEALS   • Ozempic, 1 MG/DOSE, 4 MG/3ML solution pen-injector INJECT 1 MG UNDER THE SKIN INTO THE APPROPRIATE AREA ONCE PER WEEK   • rosuvastatin (CRESTOR) 5 MG tablet TAKE 1 TABLET BY MOUTH EVERY NIGHT   • [DISCONTINUED] ondansetron (Zofran) 4 MG tablet Take 1 tablet by mouth Every 8 (Eight) Hours As Needed for Nausea or Vomiting.   • [DISCONTINUED] propranolol LA (INDERAL LA) 80 MG 24 hr capsule Take 1 capsule by mouth Daily. (Patient not taking: Reported on 4/25/2023)   • [DISCONTINUED] Rimegepant Sulfate (Nurtec) 75 MG tablet dispersible tablet Take 1 tablet by mouth Daily As Needed (migraine). (Patient not taking: Reported on 4/25/2023)     No current facility-administered medications on file prior to visit.       Health Maintenance Due   Topic Date Due   • DIABETIC FOOT EXAM  08/02/2022   • ANNUAL PHYSICAL  11/02/2022       Objective     /71   Pulse 72   Ht 167.6 cm (66\")   Wt 87.5 kg (193 lb)   SpO2 100%   BMI 31.15 kg/m²       Physical Exam  Constitutional:       General: She is not in acute distress.     Appearance: Normal appearance. She is not ill-appearing.   HENT:      Head: Normocephalic and " atraumatic.      Right Ear: Tympanic membrane, ear canal and external ear normal.      Left Ear: Tympanic membrane, ear canal and external ear normal.      Nose: Nose normal.   Cardiovascular:      Rate and Rhythm: Normal rate and regular rhythm.      Pulses:           Dorsalis pedis pulses are 2+ on the right side and 2+ on the left side.      Heart sounds: Normal heart sounds. No murmur heard.  Pulmonary:      Effort: Pulmonary effort is normal. No respiratory distress.      Breath sounds: Normal breath sounds.   Chest:      Chest wall: No tenderness.   Abdominal:      General: Abdomen is flat. Bowel sounds are normal. There is no distension.      Palpations: Abdomen is soft. There is no mass.      Tenderness: There is no abdominal tenderness. There is no guarding.   Musculoskeletal:         General: No swelling or tenderness. Normal range of motion.      Cervical back: Normal range of motion and neck supple.   Feet:      Right foot:      Protective Sensation: 3 sites tested. 3 sites sensed.      Skin integrity: Skin integrity normal. No ulcer or blister.      Toenail Condition: Right toenails are normal.      Left foot:      Protective Sensation: 3 sites tested. 3 sites sensed.      Skin integrity: Skin integrity normal. No ulcer or blister.      Toenail Condition: Left toenails are normal.      Comments:      Skin:     General: Skin is warm and dry.      Findings: No rash.   Neurological:      General: No focal deficit present.      Mental Status: She is alert and oriented to person, place, and time. Mental status is at baseline.      Gait: Gait normal.   Psychiatric:         Attention and Perception: Attention normal.         Mood and Affect: Mood normal.         Speech: Speech normal.         Behavior: Behavior normal. Behavior is cooperative.         Thought Content: Thought content normal. Thought content does not include suicidal ideation.         Cognition and Memory: Cognition normal.         Judgment:  Judgment normal.       Monofilament Test Performed    Result Review :                           Assessment and Plan        Diagnoses and all orders for this visit:    1. Annual physical exam (Primary)  -     MicroAlbumin, Urine, Random - Urine, Clean Catch; Future  -     TSH; Future  -     T4, free; Future  -     CBC w AUTO Differential; Future  -     Comprehensive metabolic panel; Future  -     Lipid panel; Future    2. Hyperlipidemia, unspecified hyperlipidemia type  Comments:  stable on crestor 5mg, continue  Orders:  -     Comprehensive metabolic panel; Future  -     Lipid panel; Future    3. Screening for thyroid disorder    4. Nausea  -     ondansetron (Zofran) 4 MG tablet; Take 1 tablet by mouth Every 8 (Eight) Hours As Needed for Nausea or Vomiting.  Dispense: 24 tablet; Refill: 1    5. Type 2 diabetes mellitus without complication, without long-term current use of insulin  Comments:  stable on ozempic and metformin 1000mg, continue  Orders:  -     MicroAlbumin, Urine, Random - Urine, Clean Catch; Future  -     Hemoglobin A1c; Future    6. Primary hypertension  Comments:  stable on cozaaar 25mg, continue  Orders:  -     CBC w AUTO Differential; Future  -     Comprehensive metabolic panel; Future    7. Depression, unspecified depression type  Comments:  stable on Lexapro 20mg, continue    8. Anxiety  Comments:  stable on lexapro 20mg adn buspar 7.5mg, continue    9. Migraine with aura and with status migrainosus, not intractable  Comments:  stable on aimovic 140mg, continue    Preventative Counseling:  Healthy diet  Daily exercise  Advised monthly self breast exams          Follow Up     Return in about 6 months (around 10/27/2023).    Patient was given instructions and counseling regarding her condition or for health maintenance advice. Please see specific information pulled into the AVS if appropriate.     Katy Denise  reports that she quit smoking about 14 years ago. Her smoking use included  cigarettes. She has a 3.75 pack-year smoking history. She has never used smokeless tobacco..

## 2023-04-28 DIAGNOSIS — E11.9 TYPE 2 DIABETES MELLITUS WITHOUT COMPLICATION, WITHOUT LONG-TERM CURRENT USE OF INSULIN: Primary | ICD-10-CM

## 2023-07-23 DIAGNOSIS — I10 ESSENTIAL HYPERTENSION: ICD-10-CM

## 2023-07-23 DIAGNOSIS — F33.0 MILD EPISODE OF RECURRENT MAJOR DEPRESSIVE DISORDER: ICD-10-CM

## 2023-07-23 DIAGNOSIS — F41.9 ANXIETY DISORDER, UNSPECIFIED TYPE: ICD-10-CM

## 2023-07-24 ENCOUNTER — OFFICE VISIT (OUTPATIENT)
Dept: CARDIOLOGY | Facility: CLINIC | Age: 48
End: 2023-07-24
Payer: COMMERCIAL

## 2023-07-24 VITALS
BODY MASS INDEX: 33.27 KG/M2 | HEIGHT: 66 IN | SYSTOLIC BLOOD PRESSURE: 123 MMHG | HEART RATE: 89 BPM | DIASTOLIC BLOOD PRESSURE: 84 MMHG | WEIGHT: 207 LBS

## 2023-07-24 DIAGNOSIS — E78.2 MIXED DYSLIPIDEMIA: ICD-10-CM

## 2023-07-24 DIAGNOSIS — R07.89 CHEST DISCOMFORT: ICD-10-CM

## 2023-07-24 DIAGNOSIS — I10 ESSENTIAL HYPERTENSION: ICD-10-CM

## 2023-07-24 DIAGNOSIS — Z79.4 TYPE 2 DIABETES MELLITUS WITHOUT COMPLICATION, WITH LONG-TERM CURRENT USE OF INSULIN: ICD-10-CM

## 2023-07-24 DIAGNOSIS — R42 DIZZINESS: ICD-10-CM

## 2023-07-24 DIAGNOSIS — R00.2 PALPITATIONS: Primary | ICD-10-CM

## 2023-07-24 DIAGNOSIS — E11.9 TYPE 2 DIABETES MELLITUS WITHOUT COMPLICATION, WITH LONG-TERM CURRENT USE OF INSULIN: ICD-10-CM

## 2023-07-24 PROCEDURE — 99204 OFFICE O/P NEW MOD 45 MIN: CPT | Performed by: INTERNAL MEDICINE

## 2023-07-24 RX ORDER — CHLORHEXIDINE GLUCONATE 4 %
LIQUID (ML) TOPICAL
COMMUNITY

## 2023-07-24 RX ORDER — ESCITALOPRAM OXALATE 20 MG/1
20 TABLET ORAL DAILY
Qty: 90 TABLET | Refills: 1 | Status: SHIPPED | OUTPATIENT
Start: 2023-07-24

## 2023-07-24 RX ORDER — LOSARTAN POTASSIUM 25 MG/1
25 TABLET ORAL DAILY
Qty: 90 TABLET | Refills: 1 | Status: SHIPPED | OUTPATIENT
Start: 2023-07-24

## 2023-07-24 NOTE — TELEPHONE ENCOUNTER
SSRI Protocol [id number]/23/2023 06:23 AM   Protocol Details PHQ2 or PHQ9 on record in past 12 months    No active pregnancy on record    No positive pregnancy test in past 12 months    Blood pressure on record in past 15 months    Recent or future visit with authorizing provider        F/U 11/7/23

## 2023-07-24 NOTE — PROGRESS NOTES
Chief Complaint  Palpitations and Dizziness      History of Present Illness  Katy Denise presents to [facility]    This is a very pleasant 48-year-old lady with past medical history significant for hypertension, diabetes, dyslipidemia and family history of ischemic heart disease presents to clinic for cardiac evaluation.  She has been having intermittent episodes of anterior chest discomfort which feels like tightness.  It radiates to her upper back and between her shoulder blades.  It is more noticeable with physical activities although it happens at rest sometimes.  It is associated with nausea.  In addition, she reports intermittent palpitations which are more pronounced with standing.  They are associated with nausea and dizziness.  She denies presyncope or syncope.  She recently stopped caffeine products and did not notice an improvement of her symptoms.  She has no other complaints today.  She has no fever or chills    Past Medical History:   Diagnosis Date    Allergic rhinitis due to allergen     Anxiety disorder     Arthritis     Clotting disorder     Diabetes mellitus, type 2     GERD (gastroesophageal reflux disease)     Hyperlipidemia     Migraine          Current Outpatient Medications:     busPIRone (BUSPAR) 7.5 MG tablet, TAKE 1 TABLET BY MOUTH TWICE DAILY AS NEEDED FOR ANXIETY, Disp: 60 tablet, Rfl: 2    Cranberry 50 MG chewable tablet, Chew. 4200 mg 2 daily, Disp: , Rfl:     diphenhydrAMINE (BENADRYL) 25 mg capsule, Take 1 capsule by mouth Every 6 (Six) Hours As Needed., Disp: , Rfl:     EPINEPHrine (EPIPEN) 0.3 MG/0.3ML solution auto-injector injection, 0.3 mL., Disp: , Rfl:     Erenumab-aooe (AIMOVIG) 140 MG/ML auto-injector, Inject 1 mL under the skin into the appropriate area as directed Every 30 (Thirty) Days., Disp: 3 mL, Rfl: 1    escitalopram (LEXAPRO) 20 MG tablet, TAKE 1 TABLET BY MOUTH DAILY, Disp: 90 tablet, Rfl: 1    hyoscyamine (ANASPAZ,LEVSIN) 0.125 MG  "tablet, Take 1 tablet by mouth Every 6 (Six) Hours As Needed., Disp: , Rfl:     losartan (COZAAR) 25 MG tablet, TAKE 1 TABLET BY MOUTH DAILY, Disp: 90 tablet, Rfl: 1    Melatonin 12 MG tablet, Take  by mouth. 2 nightly, Disp: , Rfl:     metFORMIN (GLUCOPHAGE) 1000 MG tablet, TAKE 1 TABLET BY MOUTH TWICE DAILY WITH MEALS, Disp: 180 tablet, Rfl: 1    Multiple Vitamins-Minerals (Eye Health) capsule, Take  by mouth. 2 daily, Disp: , Rfl:     ondansetron (Zofran) 4 MG tablet, Take 1 tablet by mouth Every 8 (Eight) Hours As Needed for Nausea or Vomiting., Disp: 24 tablet, Rfl: 1    Ozempic, 1 MG/DOSE, 4 MG/3ML solution pen-injector, INJECT 1 MG UNDER THE SKIN INTO THE APPROPRIATE AREA ONCE PER WEEK, Disp: 12 mL, Rfl: 1    rosuvastatin (CRESTOR) 5 MG tablet, TAKE 1 TABLET BY MOUTH EVERY NIGHT, Disp: 90 tablet, Rfl: 1    There are no discontinued medications.  Allergies   Allergen Reactions    Tomato Other (See Comments)    Latex Rash    Penicillins Unknown - Low Severity     \"My mother was very allergic.\"  \"My mother was very allergic.\"          Social History     Tobacco Use    Smoking status: Former     Packs/day: 0.25     Years: 15.00     Pack years: 3.75     Types: Cigarettes     Quit date: 2009     Years since quittin.5    Smokeless tobacco: Never    Tobacco comments:     off and on for 30 years; .23ppd   Vaping Use    Vaping Use: Never used   Substance Use Topics    Alcohol use: Yes     Alcohol/week: 2.0 standard drinks     Types: 2 Drinks containing 0.5 oz of alcohol per week     Comment: occ    Drug use: Not Currently     Types: Marijuana       Family History   Problem Relation Age of Onset    Stroke Other     Heart disease Other     Liver disease Other         cirrhosis    Diabetes Other     Heart disease Mother     Arrhythmia Maternal Grandfather     Heart attack Maternal Grandfather     Heart disease Maternal Grandfather     Heart disease Maternal Grandmother     Malig Hyperthermia Neg Hx     " "    Objective     /84   Pulse 89   Ht 167.6 cm (65.98\")   Wt 93.9 kg (207 lb)   BMI 33.43 kg/m²       Physical Exam  Constitutional:       General: Awake. Not in acute distress.     Appearance: Normal appearance.   Neck:      Vascular: No carotid bruit, hepatojugular reflux or JVD.   Cardiovascular:      Rate and Rhythm: Normal rate and regular rhythm.      Chest Wall: PMI is not displaced.      Heart sounds: Normal heart sounds, S1 normal and S2 normal. No murmur heard.   No friction rub. No gallop. No S3 or S4 sounds.    Pulmonary:      Effort: Pulmonary effort is normal.      Breath sounds: Normal breath sounds. No wheezing, rhonchi or rales.   Ext.      Right lower leg: No edema.      Left lower leg: No edema.   Skin:     General: Skin is warm and dry.      Coloration: Skin is not cyanotic.      Findings: No petechiae or rash.   Neurological:      Mental Status: Alert and oriented x 3  Psychiatric:         Behavior: Behavior is cooperative.       Result Review :     No results found for: PROBNP  CMP          4/27/2023    07:34 7/18/2023    13:48   CMP   Glucose 116  245    BUN 13  12    Creatinine 0.45  0.80    EGFR 120.3  92.2    Sodium 139  139    Potassium 4.0  3.8    Chloride 102  99    Calcium 9.0  9.9    Total Protein 7.3  7.8    Albumin 4.6  4.7    Globulin 2.7  3.1    Total Bilirubin 0.4  0.6    Alkaline Phosphatase 42  60    AST (SGOT) 19  25    ALT (SGPT) 23  30    Albumin/Globulin Ratio 1.7  1.5    BUN/Creatinine Ratio 28.9  15.0    Anion Gap 11.0  16.0      CBC w/diff          4/27/2023    07:34 7/18/2023    13:48   CBC w/Diff   WBC 6.47  5.79    RBC 4.45  4.75    Hemoglobin 13.9  14.9    Hematocrit 41.7  43.6    MCV 93.7  91.8    MCH 31.2  31.4    MCHC 33.3  34.2    RDW 13.1  13.1    Platelets 245  240    Neutrophil Rel % 51.4  53.2    Immature Granulocyte Rel % 0.3  0.5    Lymphocyte Rel % 37.1  32.6    Monocyte Rel % 9.0  10.0    Eosinophil Rel % 1.4  2.8    Basophil Rel % 0.8  0.9     "   Lab Results   Component Value Date    TSH 0.832 07/18/2023      Lab Results   Component Value Date    FREET4 1.04 04/27/2023      No results found for: DDIMERQUANT  Magnesium   Date Value Ref Range Status   07/18/2023 1.6 1.6 - 2.6 mg/dL Final      No results found for: DIGOXIN   Lab Results   Component Value Date    TROPONINT <6 07/18/2023      No results found for: POCTROP(       Lipid Panel          4/27/2023    07:34   Lipid Panel   Total Cholesterol 124    Triglycerides 128    HDL Cholesterol 65    VLDL Cholesterol 22    LDL Cholesterol  37    LDL/HDL Ratio 0.51                No results found for this or any previous visit.                Diagnoses and all orders for this visit:    1. Palpitations (Primary)  -     Holter Monitor - 48 Hour; Future    2. Dizziness    3. Essential hypertension    4. Mixed dyslipidemia    5. Type 2 diabetes mellitus without complication, with long-term current use of insulin    6. Chest discomfort  -     Adult Transthoracic Echo Complete W/ Cont if Necessary Per Protocol; Future  -     Stress Test With Myocardial Perfusion One Day; Future      Assessment:    Palpitations: She has been having recurrent palpitations associated with dizziness as described in HPI.  They are more pronounced with standing.  Her exam is benign.  Recent ER evaluation was noted and was unremarkable.  ECG shows normal sinus rhythm.  48-hour Holter monitor will be done to assess for ectopy and tachyarrhythmias.  Cardiac stress test will be done to rule out exercise-induced arrhythmias.  Echo will be done for LVEF and valvular function.  Recent thyroid function test was unremarkable.  Adequate hydration was recommended.  Compression stockings were recommended.    Chest discomfort: She has recurrent episodes of anterior chest discomfort with typical and atypical features.  Her ECG was unremarkable.  She has intermediate pretest mobility for CAD.  She will be scheduled for exercise nuclear stress test to  assess for myocardial ischemia.  Echo will be done for LVEF and wall motion.  Continue current medical therapy.  Further recommendations to follow.    Essential hypertension: Stable on current regimen.    Mixed dyslipidemia: On rosuvastatin.  Recent lipid profile was noted and was unremarkable.    Type 2 diabetes: Goal hemoglobin A1c is less than 7%.  Continue management as per primary care provider.        Follow Up       No follow-ups on file.    Patient was given instructions and counseling regarding her condition or for health maintenance advice. Please see specific information pulled into the AVS if appropriate.

## 2023-08-08 ENCOUNTER — TELEPHONE (OUTPATIENT)
Dept: CARDIOLOGY | Facility: CLINIC | Age: 48
End: 2023-08-08

## 2023-08-08 NOTE — TELEPHONE ENCOUNTER
----- Message from DELIO Freeman sent at 8/8/2023 10:28 AM EDT -----  Notify patient the results of her  ultrasound of her  heart was normal.  The heart is functioning well and there were no structural or valvular abnormalities.

## 2023-08-11 DIAGNOSIS — F41.9 ANXIETY: ICD-10-CM

## 2023-08-11 RX ORDER — BUSPIRONE HYDROCHLORIDE 7.5 MG/1
TABLET ORAL
Qty: 60 TABLET | Refills: 2 | Status: SHIPPED | OUTPATIENT
Start: 2023-08-11

## 2023-08-15 ENCOUNTER — TELEPHONE (OUTPATIENT)
Dept: CARDIOLOGY | Facility: CLINIC | Age: 48
End: 2023-08-15
Payer: COMMERCIAL

## 2023-08-15 NOTE — TELEPHONE ENCOUNTER
JOSIAH patient. Patient currently at work. Experiencing fatigue, elevated heart rate, and when standing feeling pre-syncopal episodes. 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 Patient is wearing compression socks, and drinking almost a gallon of water a day. Her heart rate at rest has been between - when she stands goes up to 130's, then gets dizzy.     Scheduled f/u with DELIO Yadav tomorrow. Patient verbalized understanding and appreciation.

## 2023-08-16 ENCOUNTER — OFFICE VISIT (OUTPATIENT)
Dept: CARDIOLOGY | Facility: CLINIC | Age: 48
End: 2023-08-16
Payer: COMMERCIAL

## 2023-08-16 VITALS
BODY MASS INDEX: 33.37 KG/M2 | SYSTOLIC BLOOD PRESSURE: 130 MMHG | WEIGHT: 207.6 LBS | HEIGHT: 66 IN | HEART RATE: 84 BPM | DIASTOLIC BLOOD PRESSURE: 84 MMHG

## 2023-08-16 DIAGNOSIS — I10 ESSENTIAL HYPERTENSION: ICD-10-CM

## 2023-08-16 DIAGNOSIS — R00.2 PALPITATIONS: ICD-10-CM

## 2023-08-16 DIAGNOSIS — E78.2 MIXED DYSLIPIDEMIA: ICD-10-CM

## 2023-08-16 DIAGNOSIS — R42 DIZZINESS: Primary | ICD-10-CM

## 2023-08-16 PROCEDURE — 99214 OFFICE O/P EST MOD 30 MIN: CPT

## 2023-08-16 NOTE — PROGRESS NOTES
"Chief Complaint  Rapid Heart Rate (Having dizziness also.  Pt is shaking quite a bit and does this all the time. ), Hypertension, Hyperlipidemia, and Follow-up    Subjective        History of Present Illness  Katy Denise presents to [facility] for follow up.   Patient is a 48-year-old  female with past medical history outlined below, significant for type 2 diabetes, hyperlipidemia who presents for follow-up.  She continues to experience dizziness while standing and sometimes while going from lying to standing position.  She denies any further episodes of chest discomfort.  She does note shortness of breath that usually occurs with her dizziness and she feels may possibly be related to anxiety.  She does note occasional sporadic palpitations which only last a few seconds.  Otherwise she feels well.  She notes no edema or actual syncopal episodes.    Past Medical History:   Diagnosis Date    Allergic rhinitis due to allergen     Anxiety disorder     Arthritis     Clotting disorder     Diabetes mellitus, type 2     GERD (gastroesophageal reflux disease)     Hyperlipidemia     Migraine        ALLERGY  Allergies   Allergen Reactions    Tomato Other (See Comments)    Latex Rash    Penicillins Unknown - Low Severity     \"My mother was very allergic.\"  \"My mother was very allergic.\"          Past Surgical History:   Procedure Laterality Date    CARPAL TUNNEL RELEASE Bilateral     COLONOSCOPY      COLONOSCOPY N/A 09/08/2022    Procedure: COLONOSCOPY with cold snare polypectomy;  Surgeon: Keshawn Yates MD;  Location: [facility] ENDOSCOPY;  Service: General;  Laterality: N/A;  colon polyp    ENDOSCOPY      HYSTERECTOMY      LUMBAR SPINE SURGERY      Microdiscectomy of thoracic or lumbar spine    TUBAL ABDOMINAL LIGATION          Social History     Socioeconomic History    Marital status:    Tobacco Use    Smoking status: Former     Packs/day: 0.25     Years: 15.00     Pack " years: 3.75     Types: Cigarettes     Quit date: 2009     Years since quittin.6    Smokeless tobacco: Never    Tobacco comments:     off and on for 30 years; .23ppd   Vaping Use    Vaping Use: Never used   Substance and Sexual Activity    Alcohol use: Yes     Alcohol/week: 2.0 standard drinks     Types: 2 Drinks containing 0.5 oz of alcohol per week     Comment: occ    Drug use: Not Currently     Types: Marijuana    Sexual activity: Yes     Partners: Male     Birth control/protection: Vasectomy, Hysterectomy       Family History   Problem Relation Age of Onset    Stroke Other     Heart disease Other     Liver disease Other         cirrhosis    Diabetes Other     Heart disease Mother     Arrhythmia Maternal Grandfather     Heart attack Maternal Grandfather     Heart disease Maternal Grandfather     Heart disease Maternal Grandmother     Malig Hyperthermia Neg Hx         Current Outpatient Medications on File Prior to Visit   Medication Sig    busPIRone (BUSPAR) 7.5 MG tablet TAKE 1 TABLET BY MOUTH TWICE DAILY AS NEEDED FOR ANXIETY    Cranberry 50 MG chewable tablet Chew. 4200 mg 2 daily    diphenhydrAMINE (BENADRYL) 25 mg capsule Take 1 capsule by mouth Every 6 (Six) Hours As Needed.    EPINEPHrine (EPIPEN) 0.3 MG/0.3ML solution auto-injector injection 0.3 mL.    Erenumab-aooe (AIMOVIG) 140 MG/ML auto-injector Inject 1 mL under the skin into the appropriate area as directed Every 30 (Thirty) Days.    escitalopram (LEXAPRO) 20 MG tablet TAKE 1 TABLET BY MOUTH DAILY    hyoscyamine (ANASPAZ,LEVSIN) 0.125 MG tablet Take 1 tablet by mouth Every 6 (Six) Hours As Needed.    losartan (COZAAR) 25 MG tablet TAKE 1 TABLET BY MOUTH DAILY    Melatonin 12 MG tablet Take  by mouth. 2 nightly    metFORMIN (GLUCOPHAGE) 1000 MG tablet TAKE 1 TABLET BY MOUTH TWICE DAILY WITH MEALS    Multiple Vitamins-Minerals (Eye Health) capsule Take  by mouth. 2 daily    ondansetron (Zofran) 4 MG tablet Take 1 tablet by mouth Every 8  "(Eight) Hours As Needed for Nausea or Vomiting.    Ozempic, 1 MG/DOSE, 4 MG/3ML solution pen-injector INJECT 1 MG UNDER THE SKIN INTO THE APPROPRIATE AREA ONCE PER WEEK    rosuvastatin (CRESTOR) 5 MG tablet TAKE 1 TABLET BY MOUTH EVERY NIGHT     No current facility-administered medications on file prior to visit.       Objective   Vitals:    08/16/23 1025   BP: 130/84   Pulse: 84   Weight: 94.2 kg (207 lb 9.6 oz)   Height: 167.6 cm (66\")       Physical Exam  Constitutional:       General: She is awake. She is not in acute distress.     Appearance: Normal appearance.   HENT:      Head: Normocephalic.      Nose: Nose normal. No congestion.   Eyes:      Extraocular Movements: Extraocular movements intact.      Conjunctiva/sclera: Conjunctivae normal.      Pupils: Pupils are equal, round, and reactive to light.   Neck:      Thyroid: No thyromegaly.      Vascular: No JVD.   Cardiovascular:      Rate and Rhythm: Normal rate and regular rhythm.      Chest Wall: PMI is not displaced.      Pulses: Normal pulses.      Heart sounds: Normal heart sounds, S1 normal and S2 normal. No murmur heard.    No friction rub. No gallop. No S3 or S4 sounds.   Pulmonary:      Effort: Pulmonary effort is normal.      Breath sounds: Normal breath sounds. No wheezing, rhonchi or rales.   Abdominal:      General: Bowel sounds are normal.      Palpations: Abdomen is soft.      Tenderness: There is no abdominal tenderness.   Musculoskeletal:      Cervical back: No tenderness.      Right lower leg: No edema.      Left lower leg: No edema.   Lymphadenopathy:      Cervical: No cervical adenopathy.   Skin:     General: Skin is warm and dry.      Capillary Refill: Capillary refill takes less than 2 seconds.      Coloration: Skin is not cyanotic.      Findings: No petechiae or rash.      Nails: There is no clubbing.   Neurological:      Mental Status: She is alert.   Psychiatric:         Mood and Affect: Mood normal.         Behavior: Behavior is " cooperative.         Result Review     The following data was reviewed by DELIO Mcwilliams on 08/16/23.    No results found for: PROBNP  CMP          4/27/2023    07:34 7/18/2023    13:48   CMP   Glucose 116  245    BUN 13  12    Creatinine 0.45  0.80    EGFR 120.3  92.2    Sodium 139  139    Potassium 4.0  3.8    Chloride 102  99    Calcium 9.0  9.9    Total Protein 7.3  7.8    Albumin 4.6  4.7    Globulin 2.7  3.1    Total Bilirubin 0.4  0.6    Alkaline Phosphatase 42  60    AST (SGOT) 19  25    ALT (SGPT) 23  30    Albumin/Globulin Ratio 1.7  1.5    BUN/Creatinine Ratio 28.9  15.0    Anion Gap 11.0  16.0      CBC w/diff          4/27/2023    07:34 7/18/2023    13:48   CBC w/Diff   WBC 6.47  5.79    RBC 4.45  4.75    Hemoglobin 13.9  14.9    Hematocrit 41.7  43.6    MCV 93.7  91.8    MCH 31.2  31.4    MCHC 33.3  34.2    RDW 13.1  13.1    Platelets 245  240    Neutrophil Rel % 51.4  53.2    Immature Granulocyte Rel % 0.3  0.5    Lymphocyte Rel % 37.1  32.6    Monocyte Rel % 9.0  10.0    Eosinophil Rel % 1.4  2.8    Basophil Rel % 0.8  0.9       Lipid Panel          4/27/2023    07:34   Lipid Panel   Total Cholesterol 124    Triglycerides 128    HDL Cholesterol 65    VLDL Cholesterol 22    LDL Cholesterol  37    LDL/HDL Ratio 0.51        Results for orders placed in visit on 08/08/23    Adult Transthoracic Echo Complete W/ Cont if Necessary Per Protocol    Interpretation Summary    Left ventricular systolic function is normal. Calculated left ventricular EF = 61.5%    Left ventricular diastolic function was normal.    No hemodynamically significant valvular pathology.           Procedures    Assessment & Plan  Diagnoses and all orders for this visit:    1. Dizziness (Primary)    2. Palpitations    3. Essential hypertension    4. Mixed dyslipidemia    1.  Orthostatic blood pressure was checked in the office.  While her blood pressure remained stable her heart rate did go from the 80s to low 100s with position  changes.  She was advised to significantly increase her water intake.  Wear compression stockings.  Practice core strengthening exercises.  2.  Her palpitations are self-limiting.  Recent Holter monitor was unremarkable.  Recent echocardiogram was normal.  Patient was reassured.  She is scheduled for stress testing and was advised to keep this appointment.  3.  Blood pressure is well controlled.  Continue losartan 25 mg daily.  4.  Continue rosuvastatin 5 mg daily.  Post recent LDL was 37.  Total cholesterol was 124.  These are under excellent control.    Follow Up   Return in about 6 weeks (around 9/27/2023) for After testing complete, With .    Patient was given instructions and counseling regarding her condition or for health maintenance advice. Please see specific information pulled into the AVS if appropriate.     Vicenta Altman, DELIO  08/16/23  11:12 EDT    Dictated Utilizing Dragon Dictation

## 2023-08-28 ENCOUNTER — HOSPITAL ENCOUNTER (OUTPATIENT)
Dept: NUCLEAR MEDICINE | Facility: HOSPITAL | Age: 48
Discharge: HOME | End: 2023-08-28
Payer: COMMERCIAL

## 2023-08-28 DIAGNOSIS — R07.89 CHEST DISCOMFORT: ICD-10-CM

## 2023-08-28 PROCEDURE — A9502 TC99M TETROFOSMIN: HCPCS | Performed by: INTERNAL MEDICINE

## 2023-08-28 PROCEDURE — 0 TECHNETIUM TETROFOSMIN KIT: Performed by: INTERNAL MEDICINE

## 2023-08-28 PROCEDURE — 78452 HT MUSCLE IMAGE SPECT MULT: CPT

## 2023-08-28 PROCEDURE — 93017 CV STRESS TEST TRACING ONLY: CPT

## 2023-08-28 RX ADMIN — TETROFOSMIN 1 DOSE: 1.38 INJECTION, POWDER, LYOPHILIZED, FOR SOLUTION INTRAVENOUS at 08:09

## 2023-08-28 RX ADMIN — TETROFOSMIN 1 DOSE: 1.38 INJECTION, POWDER, LYOPHILIZED, FOR SOLUTION INTRAVENOUS at 07:10

## 2023-08-29 ENCOUNTER — TELEPHONE (OUTPATIENT)
Dept: CARDIOLOGY | Facility: CLINIC | Age: 48
End: 2023-08-29
Payer: COMMERCIAL

## 2023-08-29 ENCOUNTER — PREP FOR SURGERY (OUTPATIENT)
Dept: OTHER | Facility: HOSPITAL | Age: 48
End: 2023-08-29
Payer: COMMERCIAL

## 2023-08-29 ENCOUNTER — OFFICE VISIT (OUTPATIENT)
Dept: CARDIOLOGY | Facility: CLINIC | Age: 48
End: 2023-08-29
Payer: COMMERCIAL

## 2023-08-29 VITALS
BODY MASS INDEX: 32.95 KG/M2 | WEIGHT: 205 LBS | DIASTOLIC BLOOD PRESSURE: 82 MMHG | HEIGHT: 66 IN | SYSTOLIC BLOOD PRESSURE: 137 MMHG | HEART RATE: 101 BPM

## 2023-08-29 DIAGNOSIS — R00.2 PALPITATIONS: ICD-10-CM

## 2023-08-29 DIAGNOSIS — R42 DIZZINESS: ICD-10-CM

## 2023-08-29 DIAGNOSIS — I47.20 V-TACH: Primary | ICD-10-CM

## 2023-08-29 DIAGNOSIS — I10 ESSENTIAL HYPERTENSION: ICD-10-CM

## 2023-08-29 DIAGNOSIS — E78.2 MIXED DYSLIPIDEMIA: ICD-10-CM

## 2023-08-29 LAB
BH CV IMMEDIATE POST TECH DATA BLOOD PRESSURE: NORMAL MMHG
BH CV IMMEDIATE POST TECH DATA HEART RATE: 124 BPM
BH CV IMMEDIATE POST TECH DATA OXYGEN SATS: 97 %
BH CV NINE MINUTE RECOVERY TECH DATA BLOOD PRESSURE: NORMAL MMHG
BH CV NINE MINUTE RECOVERY TECH DATA HEART RATE: 95 BPM
BH CV NINE MINUTE RECOVERY TECH DATA OXYGEN SATURATION: 97 %
BH CV REST NUCLEAR ISOTOPE DOSE: 9.4 MCI
BH CV SIX MINUTE RECOVERY TECH DATA BLOOD PRESSURE: NORMAL
BH CV SIX MINUTE RECOVERY TECH DATA HEART RATE: 99 BPM
BH CV SIX MINUTE RECOVERY TECH DATA OXYGEN SATURATION: 97 %
BH CV STRESS BP STAGE 1: NORMAL
BH CV STRESS BP STAGE 2: NORMAL
BH CV STRESS BP STAGE 3: NORMAL
BH CV STRESS DURATION MIN STAGE 1: 3
BH CV STRESS DURATION MIN STAGE 2: 3
BH CV STRESS DURATION MIN STAGE 3: 3
BH CV STRESS DURATION SEC STAGE 1: 0
BH CV STRESS DURATION SEC STAGE 2: 0
BH CV STRESS DURATION SEC STAGE 3: 0
BH CV STRESS GRADE STAGE 1: 10
BH CV STRESS GRADE STAGE 2: 12
BH CV STRESS GRADE STAGE 3: 14
BH CV STRESS HR STAGE 1: 130
BH CV STRESS HR STAGE 2: 152
BH CV STRESS HR STAGE 3: 166
BH CV STRESS METS STAGE 1: 5
BH CV STRESS METS STAGE 2: 7.5
BH CV STRESS METS STAGE 3: 10
BH CV STRESS NUCLEAR ISOTOPE DOSE: 36.1 MCI
BH CV STRESS O2 STAGE 1: 96
BH CV STRESS O2 STAGE 2: 96
BH CV STRESS O2 STAGE 3: 96
BH CV STRESS PROTOCOL 1: NORMAL
BH CV STRESS RECOVERY BP: NORMAL MMHG
BH CV STRESS RECOVERY HR: 95 BPM
BH CV STRESS RECOVERY O2: 97 %
BH CV STRESS SPEED STAGE 1: 1.7
BH CV STRESS SPEED STAGE 2: 2.5
BH CV STRESS SPEED STAGE 3: 3.4
BH CV STRESS STAGE 1: 1
BH CV STRESS STAGE 2: 2
BH CV STRESS STAGE 3: 3
BH CV THREE MINUTE POST TECH DATA BLOOD PRESSURE: NORMAL MMHG
BH CV THREE MINUTE POST TECH DATA HEART RATE: 104 BPM
BH CV THREE MINUTE POST TECH DATA OXYGEN SATURATION: 97 %
LV EF NUC BP: 71 %
MAXIMAL PREDICTED HEART RATE: 174 BPM
PERCENT MAX PREDICTED HR: 95.4 %
STRESS BASELINE BP: NORMAL MMHG
STRESS BASELINE HR: 81 BPM
STRESS O2 SAT REST: 93 %
STRESS PERCENT HR: 112 %
STRESS POST ESTIMATED WORKLOAD: 8.9 METS
STRESS POST EXERCISE DUR MIN: 7 MIN
STRESS POST EXERCISE DUR SEC: 14 SEC
STRESS POST O2 SAT PEAK: 94 %
STRESS POST PEAK BP: 193.53 MMHG
STRESS POST PEAK HR: 166 BPM
STRESS TARGET HR: 148 BPM

## 2023-08-29 RX ORDER — SODIUM CHLORIDE 0.9 % (FLUSH) 0.9 %
10 SYRINGE (ML) INJECTION AS NEEDED
Status: CANCELLED | OUTPATIENT
Start: 2023-08-29

## 2023-08-29 RX ORDER — SODIUM CHLORIDE 9 MG/ML
40 INJECTION, SOLUTION INTRAVENOUS AS NEEDED
Status: CANCELLED | OUTPATIENT
Start: 2023-08-29

## 2023-08-29 RX ORDER — SODIUM CHLORIDE 0.9 % (FLUSH) 0.9 %
10 SYRINGE (ML) INJECTION EVERY 12 HOURS SCHEDULED
Status: CANCELLED | OUTPATIENT
Start: 2023-08-29

## 2023-08-29 RX ORDER — METOPROLOL SUCCINATE 25 MG/1
25 TABLET, EXTENDED RELEASE ORAL DAILY
Qty: 90 TABLET | Refills: 3 | Status: SHIPPED | OUTPATIENT
Start: 2023-08-29

## 2023-08-29 NOTE — TELEPHONE ENCOUNTER
I spoke to patient and gave an arrival time of 10:30 on 08/30/23 for [facility]. Patient was instructed to have a  for the day of the procedure and to arrive at the main entrance/registration area. Patient was educated about stent placement and informed that in the event of stent placement, the patient will be required to stay overnight for observation. Patient was instructed to hold Metformin for 24 hours prior to procedure. Patient was instructed to continue all other medications as usual. Patient was instructed to be NPO after midnight with sips of water as needed. Patient is agreeable with no other questions or concerns.

## 2023-08-29 NOTE — H&P (VIEW-ONLY)
"Chief Complaint  Hypertension, Hyperlipidemia, and Follow-up (F/U per your request for testing results)    Subjective        History of Present Illness  Katy Denise presents to [facility] for follow up. Patient is a 48-year-old  female with past medical history outlined below, significant for type 2 diabetes, hyperlipidemia who presents for follow-up.  She continues to experience dizziness while standing and sometimes going from lying to standing position.  She notes associated shortness of breath that occurs with her dizziness.  He also has occasional sporadic palpitations which only last a few seconds.  Otherwise she feels well.  She notes no edema or actual syncopal episodes.  She is following up on a stress test that was done yesterday.      Past Medical History:   Diagnosis Date    Allergic rhinitis due to allergen     Anxiety disorder     Arthritis     Clotting disorder     Diabetes mellitus, type 2     GERD (gastroesophageal reflux disease)     Hyperlipidemia     Migraine        ALLERGY  Allergies   Allergen Reactions    Tomato Other (See Comments)    Latex Rash    Penicillins Unknown - Low Severity     \"My mother was very allergic.\"  \"My mother was very allergic.\"          Past Surgical History:   Procedure Laterality Date    CARPAL TUNNEL RELEASE Bilateral     COLONOSCOPY      COLONOSCOPY N/A 2022    Procedure: COLONOSCOPY with cold snare polypectomy;  Surgeon: Keshawn Yates MD;  Location: [facility] ENDOSCOPY;  Service: General;  Laterality: N/A;  colon polyp    ENDOSCOPY      HYSTERECTOMY      LUMBAR SPINE SURGERY      Microdiscectomy of thoracic or lumbar spine    TUBAL ABDOMINAL LIGATION          Social History     Socioeconomic History    Marital status:    Tobacco Use    Smoking status: Former     Packs/day: 0.25     Years: 15.00     Pack years: 3.75     Types: Cigarettes     Quit date: 2009     Years since quittin.6    Smokeless " tobacco: Never    Tobacco comments:     off and on for 30 years; .23ppd   Vaping Use    Vaping Use: Never used   Substance and Sexual Activity    Alcohol use: Yes     Alcohol/week: 2.0 standard drinks     Types: 2 Drinks containing 0.5 oz of alcohol per week     Comment: occ    Drug use: Not Currently     Types: Marijuana    Sexual activity: Yes     Partners: Male     Birth control/protection: Vasectomy, Hysterectomy       Family History   Problem Relation Age of Onset    Stroke Other     Heart disease Other     Liver disease Other         cirrhosis    Diabetes Other     Heart disease Mother     Arrhythmia Maternal Grandfather     Heart attack Maternal Grandfather     Heart disease Maternal Grandfather     Heart disease Maternal Grandmother     Malig Hyperthermia Neg Hx         Current Outpatient Medications on File Prior to Visit   Medication Sig    busPIRone (BUSPAR) 7.5 MG tablet TAKE 1 TABLET BY MOUTH TWICE DAILY AS NEEDED FOR ANXIETY    Cranberry 50 MG chewable tablet Chew. 4200 mg 2 daily    diphenhydrAMINE (BENADRYL) 25 mg capsule Take 1 capsule by mouth Every 6 (Six) Hours As Needed.    EPINEPHrine (EPIPEN) 0.3 MG/0.3ML solution auto-injector injection 0.3 mL.    Erenumab-aooe (AIMOVIG) 140 MG/ML auto-injector Inject 1 mL under the skin into the appropriate area as directed Every 30 (Thirty) Days.    escitalopram (LEXAPRO) 20 MG tablet TAKE 1 TABLET BY MOUTH DAILY    hyoscyamine (ANASPAZ,LEVSIN) 0.125 MG tablet Take 1 tablet by mouth Every 6 (Six) Hours As Needed.    losartan (COZAAR) 25 MG tablet TAKE 1 TABLET BY MOUTH DAILY    Melatonin 12 MG tablet Take  by mouth. 2 nightly    metFORMIN (GLUCOPHAGE) 1000 MG tablet TAKE 1 TABLET BY MOUTH TWICE DAILY WITH MEALS    Multiple Vitamins-Minerals (Eye Health) capsule Take  by mouth. 2 daily    ondansetron (Zofran) 4 MG tablet Take 1 tablet by mouth Every 8 (Eight) Hours As Needed for Nausea or Vomiting.    Ozempic, 1 MG/DOSE, 4 MG/3ML solution pen-injector INJECT  "1 MG UNDER THE SKIN INTO THE APPROPRIATE AREA ONCE PER WEEK    rosuvastatin (CRESTOR) 5 MG tablet TAKE 1 TABLET BY MOUTH EVERY NIGHT     No current facility-administered medications on file prior to visit.       Objective   Vitals:    08/29/23 0823   BP: 137/82   Pulse: 101   Weight: 93 kg (205 lb)   Height: 167.6 cm (66\")       Physical Exam  Constitutional:       General: She is awake. She is not in acute distress.     Appearance: Normal appearance.   HENT:      Head: Normocephalic.      Nose: Nose normal. No congestion.   Eyes:      Extraocular Movements: Extraocular movements intact.      Conjunctiva/sclera: Conjunctivae normal.      Pupils: Pupils are equal, round, and reactive to light.   Neck:      Thyroid: No thyromegaly.      Vascular: No JVD.   Cardiovascular:      Rate and Rhythm: Normal rate and regular rhythm.      Chest Wall: PMI is not displaced.      Pulses: Normal pulses.      Heart sounds: Normal heart sounds, S1 normal and S2 normal. No murmur heard.    No friction rub. No gallop. No S3 or S4 sounds.   Pulmonary:      Effort: Pulmonary effort is normal.      Breath sounds: Normal breath sounds. No wheezing, rhonchi or rales.   Abdominal:      General: Bowel sounds are normal.      Palpations: Abdomen is soft.      Tenderness: There is no abdominal tenderness.   Musculoskeletal:      Cervical back: No tenderness.      Right lower leg: No edema.      Left lower leg: No edema.   Lymphadenopathy:      Cervical: No cervical adenopathy.   Skin:     General: Skin is warm and dry.      Capillary Refill: Capillary refill takes less than 2 seconds.      Coloration: Skin is not cyanotic.      Findings: No petechiae or rash.      Nails: There is no clubbing.   Neurological:      Mental Status: She is alert.   Psychiatric:         Mood and Affect: Mood normal.         Behavior: Behavior is cooperative.         Result Review     The following data was reviewed by DELIO Mcwilliams on 08/29/23.    No " results found for: PROBNP  CMP          4/27/2023    07:34 7/18/2023    13:48   CMP   Glucose 116  245    BUN 13  12    Creatinine 0.45  0.80    EGFR 120.3  92.2    Sodium 139  139    Potassium 4.0  3.8    Chloride 102  99    Calcium 9.0  9.9    Total Protein 7.3  7.8    Albumin 4.6  4.7    Globulin 2.7  3.1    Total Bilirubin 0.4  0.6    Alkaline Phosphatase 42  60    AST (SGOT) 19  25    ALT (SGPT) 23  30    Albumin/Globulin Ratio 1.7  1.5    BUN/Creatinine Ratio 28.9  15.0    Anion Gap 11.0  16.0      CBC w/diff          4/27/2023    07:34 7/18/2023    13:48   CBC w/Diff   WBC 6.47  5.79    RBC 4.45  4.75    Hemoglobin 13.9  14.9    Hematocrit 41.7  43.6    MCV 93.7  91.8    MCH 31.2  31.4    MCHC 33.3  34.2    RDW 13.1  13.1    Platelets 245  240    Neutrophil Rel % 51.4  53.2    Immature Granulocyte Rel % 0.3  0.5    Lymphocyte Rel % 37.1  32.6    Monocyte Rel % 9.0  10.0    Eosinophil Rel % 1.4  2.8    Basophil Rel % 0.8  0.9       Lipid Panel          4/27/2023    07:34   Lipid Panel   Total Cholesterol 124    Triglycerides 128    HDL Cholesterol 65    VLDL Cholesterol 22    LDL Cholesterol  37    LDL/HDL Ratio 0.51        Results for orders placed in visit on 08/08/23    Adult Transthoracic Echo Complete W/ Cont if Necessary Per Protocol    Interpretation Summary    Left ventricular systolic function is normal. Calculated left ventricular EF = 61.5%    Left ventricular diastolic function was normal.    No hemodynamically significant valvular pathology.           Procedures    Assessment & Plan  Diagnoses and all orders for this visit:    1. V-tach (Primary)    2. Dizziness    3. Palpitations    4. Essential hypertension    5. Mixed dyslipidemia    Other orders  -     metoprolol succinate XL (TOPROL-XL) 25 MG 24 hr tablet; Take 1 tablet by mouth Daily.  Dispense: 90 tablet; Refill: 3      1.  Prolonged episode of V. tach seen during stress testing.  Associated with shortness of breath.  Patient will be  scheduled for left heart catheterization with possible PCI for further evaluation.  In the meantime she will be started on metoprolol 25 mg daily.  She may need referral to EP for consideration of ablation if her cardiac cath does not demonstrate any significant coronary artery disease.  2.  Dizziness may be related to EKG changes.  Further recommendations pending results of left heart catheterization.  3.  metoprolol will be started as above.  4.  Blood pressure is well controlled.  Continue current antihypertensive regimen with the addition of metoprolol as above.  5.  Continue rosuvastatin 5 mg daily.  Recent LDL was 37.  Total cholesterol was 124.  These are under excellent control.    Follow Up   No follow-ups on file.    Patient was given instructions and counseling regarding her condition or for health maintenance advice. Please see specific information pulled into the AVS if appropriate.     Vicenta Altman, DELIO  08/29/23  08:40 EDT    Dictated Utilizing Dragon Dictation

## 2023-08-29 NOTE — PROGRESS NOTES
"Chief Complaint  Hypertension, Hyperlipidemia, and Follow-up (F/U per your request for testing results)    Subjective        History of Present Illness  Katy Denise presents to [facility] CARDIOLOGY for follow up. Patient is a 48-year-old  female with past medical history outlined below, significant for type 2 diabetes, hyperlipidemia who presents for follow-up.  She continues to experience dizziness while standing and sometimes going from lying to standing position.  She notes associated shortness of breath that occurs with her dizziness.  He also has occasional sporadic palpitations which only last a few seconds.  Otherwise she feels well.  She notes no edema or actual syncopal episodes.  She is following up on a stress test that was done yesterday.      Past Medical History:   Diagnosis Date    Allergic rhinitis due to allergen     Anxiety disorder     Arthritis     Clotting disorder     Diabetes mellitus, type 2     GERD (gastroesophageal reflux disease)     Hyperlipidemia     Migraine        ALLERGY  Allergies   Allergen Reactions    Tomato Other (See Comments)    Latex Rash    Penicillins Unknown - Low Severity     \"My mother was very allergic.\"  \"My mother was very allergic.\"          Past Surgical History:   Procedure Laterality Date    CARPAL TUNNEL RELEASE Bilateral     COLONOSCOPY      COLONOSCOPY N/A 2022    Procedure: COLONOSCOPY with cold snare polypectomy;  Surgeon: Keshawn Yates MD;  Location: [facility] ENDOSCOPY;  Service: General;  Laterality: N/A;  colon polyp    ENDOSCOPY      HYSTERECTOMY      LUMBAR SPINE SURGERY      Microdiscectomy of thoracic or lumbar spine    TUBAL ABDOMINAL LIGATION          Social History     Socioeconomic History    Marital status:    Tobacco Use    Smoking status: Former     Packs/day: 0.25     Years: 15.00     Pack years: 3.75     Types: Cigarettes     Quit date: 2009     Years since quittin.6    Smokeless " tobacco: Never    Tobacco comments:     off and on for 30 years; .23ppd   Vaping Use    Vaping Use: Never used   Substance and Sexual Activity    Alcohol use: Yes     Alcohol/week: 2.0 standard drinks     Types: 2 Drinks containing 0.5 oz of alcohol per week     Comment: occ    Drug use: Not Currently     Types: Marijuana    Sexual activity: Yes     Partners: Male     Birth control/protection: Vasectomy, Hysterectomy       Family History   Problem Relation Age of Onset    Stroke Other     Heart disease Other     Liver disease Other         cirrhosis    Diabetes Other     Heart disease Mother     Arrhythmia Maternal Grandfather     Heart attack Maternal Grandfather     Heart disease Maternal Grandfather     Heart disease Maternal Grandmother     Malig Hyperthermia Neg Hx         Current Outpatient Medications on File Prior to Visit   Medication Sig    busPIRone (BUSPAR) 7.5 MG tablet TAKE 1 TABLET BY MOUTH TWICE DAILY AS NEEDED FOR ANXIETY    Cranberry 50 MG chewable tablet Chew. 4200 mg 2 daily    diphenhydrAMINE (BENADRYL) 25 mg capsule Take 1 capsule by mouth Every 6 (Six) Hours As Needed.    EPINEPHrine (EPIPEN) 0.3 MG/0.3ML solution auto-injector injection 0.3 mL.    Erenumab-aooe (AIMOVIG) 140 MG/ML auto-injector Inject 1 mL under the skin into the appropriate area as directed Every 30 (Thirty) Days.    escitalopram (LEXAPRO) 20 MG tablet TAKE 1 TABLET BY MOUTH DAILY    hyoscyamine (ANASPAZ,LEVSIN) 0.125 MG tablet Take 1 tablet by mouth Every 6 (Six) Hours As Needed.    losartan (COZAAR) 25 MG tablet TAKE 1 TABLET BY MOUTH DAILY    Melatonin 12 MG tablet Take  by mouth. 2 nightly    metFORMIN (GLUCOPHAGE) 1000 MG tablet TAKE 1 TABLET BY MOUTH TWICE DAILY WITH MEALS    Multiple Vitamins-Minerals (Eye Health) capsule Take  by mouth. 2 daily    ondansetron (Zofran) 4 MG tablet Take 1 tablet by mouth Every 8 (Eight) Hours As Needed for Nausea or Vomiting.    Ozempic, 1 MG/DOSE, 4 MG/3ML solution pen-injector INJECT  "1 MG UNDER THE SKIN INTO THE APPROPRIATE AREA ONCE PER WEEK    rosuvastatin (CRESTOR) 5 MG tablet TAKE 1 TABLET BY MOUTH EVERY NIGHT     No current facility-administered medications on file prior to visit.       Objective   Vitals:    08/29/23 0823   BP: 137/82   Pulse: 101   Weight: 93 kg (205 lb)   Height: 167.6 cm (66\")       Physical Exam  Constitutional:       General: She is awake. She is not in acute distress.     Appearance: Normal appearance.   HENT:      Head: Normocephalic.      Nose: Nose normal. No congestion.   Eyes:      Extraocular Movements: Extraocular movements intact.      Conjunctiva/sclera: Conjunctivae normal.      Pupils: Pupils are equal, round, and reactive to light.   Neck:      Thyroid: No thyromegaly.      Vascular: No JVD.   Cardiovascular:      Rate and Rhythm: Normal rate and regular rhythm.      Chest Wall: PMI is not displaced.      Pulses: Normal pulses.      Heart sounds: Normal heart sounds, S1 normal and S2 normal. No murmur heard.    No friction rub. No gallop. No S3 or S4 sounds.   Pulmonary:      Effort: Pulmonary effort is normal.      Breath sounds: Normal breath sounds. No wheezing, rhonchi or rales.   Abdominal:      General: Bowel sounds are normal.      Palpations: Abdomen is soft.      Tenderness: There is no abdominal tenderness.   Musculoskeletal:      Cervical back: No tenderness.      Right lower leg: No edema.      Left lower leg: No edema.   Lymphadenopathy:      Cervical: No cervical adenopathy.   Skin:     General: Skin is warm and dry.      Capillary Refill: Capillary refill takes less than 2 seconds.      Coloration: Skin is not cyanotic.      Findings: No petechiae or rash.      Nails: There is no clubbing.   Neurological:      Mental Status: She is alert.   Psychiatric:         Mood and Affect: Mood normal.         Behavior: Behavior is cooperative.         Result Review     The following data was reviewed by DELIO Mcwilliams on 08/29/23.    No " results found for: PROBNP  CMP          4/27/2023    07:34 7/18/2023    13:48   CMP   Glucose 116  245    BUN 13  12    Creatinine 0.45  0.80    EGFR 120.3  92.2    Sodium 139  139    Potassium 4.0  3.8    Chloride 102  99    Calcium 9.0  9.9    Total Protein 7.3  7.8    Albumin 4.6  4.7    Globulin 2.7  3.1    Total Bilirubin 0.4  0.6    Alkaline Phosphatase 42  60    AST (SGOT) 19  25    ALT (SGPT) 23  30    Albumin/Globulin Ratio 1.7  1.5    BUN/Creatinine Ratio 28.9  15.0    Anion Gap 11.0  16.0      CBC w/diff          4/27/2023    07:34 7/18/2023    13:48   CBC w/Diff   WBC 6.47  5.79    RBC 4.45  4.75    Hemoglobin 13.9  14.9    Hematocrit 41.7  43.6    MCV 93.7  91.8    MCH 31.2  31.4    MCHC 33.3  34.2    RDW 13.1  13.1    Platelets 245  240    Neutrophil Rel % 51.4  53.2    Immature Granulocyte Rel % 0.3  0.5    Lymphocyte Rel % 37.1  32.6    Monocyte Rel % 9.0  10.0    Eosinophil Rel % 1.4  2.8    Basophil Rel % 0.8  0.9       Lipid Panel          4/27/2023    07:34   Lipid Panel   Total Cholesterol 124    Triglycerides 128    HDL Cholesterol 65    VLDL Cholesterol 22    LDL Cholesterol  37    LDL/HDL Ratio 0.51        Results for orders placed in visit on 08/08/23    Adult Transthoracic Echo Complete W/ Cont if Necessary Per Protocol    Interpretation Summary    Left ventricular systolic function is normal. Calculated left ventricular EF = 61.5%    Left ventricular diastolic function was normal.    No hemodynamically significant valvular pathology.           Procedures    Assessment & Plan  Diagnoses and all orders for this visit:    1. V-tach (Primary)    2. Dizziness    3. Palpitations    4. Essential hypertension    5. Mixed dyslipidemia    Other orders  -     metoprolol succinate XL (TOPROL-XL) 25 MG 24 hr tablet; Take 1 tablet by mouth Daily.  Dispense: 90 tablet; Refill: 3      1.  Prolonged episode of V. tach seen during stress testing.  Associated with shortness of breath.  Patient will be  scheduled for left heart catheterization with possible PCI for further evaluation.  In the meantime she will be started on metoprolol 25 mg daily.  She may need referral to EP for consideration of ablation if her cardiac cath does not demonstrate any significant coronary artery disease.  2.  Dizziness may be related to EKG changes.  Further recommendations pending results of left heart catheterization.  3.  metoprolol will be started as above.  4.  Blood pressure is well controlled.  Continue current antihypertensive regimen with the addition of metoprolol as above.  5.  Continue rosuvastatin 5 mg daily.  Recent LDL was 37.  Total cholesterol was 124.  These are under excellent control.    Follow Up   No follow-ups on file.    Patient was given instructions and counseling regarding her condition or for health maintenance advice. Please see specific information pulled into the AVS if appropriate.     Vicenta Altman, DELIO  08/29/23  08:40 EDT    Dictated Utilizing Dragon Dictation

## 2023-08-29 NOTE — PRE-PROCEDURE INSTRUCTIONS
PATIENT INSTRUCTED TO BE:    - NPO AFTER MIDNIGHT EXCEPT CAN HAVE SIPS OF WATER WITH HIS MEDICATION PRIOR TO PROCEDURE    -  INSTRUCTED NO LOTIONS, JEWELRY, PIERCINGS, OR DEODORANT DAY OF THE PROCEDURE    - INSTRUCTED TO TAKE THE FOLLOWING MEDICATIONS THE DAY OF SURGERY:     AS DIRECTED PER CARDIOLOGIST OFFICE.     LAST DOSE METFORMIN 8/29/23 AM, TO HOLD PRE PROCEDURE      - INSTRUCTED PT TO FOLLOW ANY INSTRUCTIONS GIVEN BY HIS CARDIOLOGIST.    - INFORMED PT THEY ATTEMPT RADIAL APPROACH FIRST IF UNABLE TO PERFORM CATH WITH THAT APPROACH THEY WILL DO A FEMORAL APPROACH.  ALSO, INFORMED PT THEY WILL BE ON BEDREST POSTOP.  IF THE SURGEON FEELS  AN INTERVENTION OR STENTS NEEDS TO BE PLACED, HE/SHE WILL STAY OVER NIGHT FOR OBSERVATION AND MONITORING.     - INFORMED THE PATIENT HE CAN HAVE TWO VISITORS WITH HIM THE DAY OF THE PROCEDURE    - INSTRUCTED PT TO PARK IN THE PARKING GARAGE, ENTER THE HOSPITAL THRU ENTRANCE A AND  CHECK IN AT THE MAIN REGISTRATION DESK, AFTER REGISTRATION PT WILL BE TAKEN THE THE PREOP AREA FOR HIS PROCEDURE.    -ARRIVAL TIME GIVEN BY CARDIOLOGIST OFFICE, INFORMED PT IF ARRIVAL TIME CHANGES OR ADJUSTMENTS NEED TO BE MADE IN THEIR ARRIVAL TIME, HE/SHE WOULD RECEIVE A CALL.    -INSTRUCTED PT TO COME TO [facility] TO GET THEIR LABS/ EKG DONE PRIOR TO PROCEDURE      - PATIENT VERBALIZED UNDERSTANDING

## 2023-08-30 ENCOUNTER — HOSPITAL ENCOUNTER (OUTPATIENT)
Facility: HOSPITAL | Age: 48
Setting detail: HOSPITAL OUTPATIENT SURGERY
Discharge: HOME | End: 2023-08-30
Attending: INTERNAL MEDICINE | Admitting: INTERNAL MEDICINE
Payer: COMMERCIAL

## 2023-08-30 VITALS
SYSTOLIC BLOOD PRESSURE: 111 MMHG | OXYGEN SATURATION: 96 % | RESPIRATION RATE: 14 BRPM | BODY MASS INDEX: 32.95 KG/M2 | WEIGHT: 205 LBS | HEART RATE: 82 BPM | DIASTOLIC BLOOD PRESSURE: 70 MMHG | TEMPERATURE: 97.9 F | HEIGHT: 66 IN

## 2023-08-30 DIAGNOSIS — I47.20 V-TACH: ICD-10-CM

## 2023-08-30 LAB
ANION GAP SERPL CALCULATED.3IONS-SCNC: 13.9 MMOL/L (ref 5–15)
BUN SERPL-MCNC: 11 MG/DL (ref 6–20)
BUN/CREAT SERPL: 19.3 (ref 7–25)
CALCIUM SPEC-SCNC: 9.8 MG/DL (ref 8.6–10.5)
CHLORIDE SERPL-SCNC: 102 MMOL/L (ref 98–107)
CO2 SERPL-SCNC: 21.1 MMOL/L (ref 22–29)
CREAT SERPL-MCNC: 0.57 MG/DL (ref 0.57–1)
DEPRECATED RDW RBC AUTO: 40.7 FL (ref 37–54)
EGFRCR SERPLBLD CKD-EPI 2021: 113.7 ML/MIN/1.73
ERYTHROCYTE [DISTWIDTH] IN BLOOD BY AUTOMATED COUNT: 12.4 % (ref 12.3–15.4)
GLUCOSE SERPL-MCNC: 130 MG/DL (ref 65–99)
HCT VFR BLD AUTO: 41.6 % (ref 34–46.6)
HGB BLD-MCNC: 14.7 G/DL (ref 12–15.9)
INR PPP: 0.9 (ref 0.86–1.15)
MCH RBC QN AUTO: 32.1 PG (ref 26.6–33)
MCHC RBC AUTO-ENTMCNC: 35.3 G/DL (ref 31.5–35.7)
MCV RBC AUTO: 90.8 FL (ref 79–97)
PLATELET # BLD AUTO: 258 10*3/MM3 (ref 140–450)
PMV BLD AUTO: 10.1 FL (ref 6–12)
POTASSIUM SERPL-SCNC: 4.2 MMOL/L (ref 3.5–5.2)
PROTHROMBIN TIME: 12.2 SECONDS (ref 11.8–14.9)
RBC # BLD AUTO: 4.58 10*6/MM3 (ref 3.77–5.28)
SODIUM SERPL-SCNC: 137 MMOL/L (ref 136–145)
WBC NRBC COR # BLD: 6.4 10*3/MM3 (ref 3.4–10.8)

## 2023-08-30 PROCEDURE — 93458 L HRT ARTERY/VENTRICLE ANGIO: CPT | Performed by: INTERNAL MEDICINE

## 2023-08-30 PROCEDURE — 80048 BASIC METABOLIC PNL TOTAL CA: CPT

## 2023-08-30 PROCEDURE — 94799 UNLISTED PULMONARY SVC/PX: CPT

## 2023-08-30 PROCEDURE — 25010000002 MIDAZOLAM PER 1MG: Performed by: INTERNAL MEDICINE

## 2023-08-30 PROCEDURE — 85027 COMPLETE CBC AUTOMATED: CPT

## 2023-08-30 PROCEDURE — C1769 GUIDE WIRE: HCPCS | Performed by: INTERNAL MEDICINE

## 2023-08-30 PROCEDURE — 25510000001 IOPAMIDOL PER 1 ML: Performed by: INTERNAL MEDICINE

## 2023-08-30 PROCEDURE — 25010000002 HEPARIN (PORCINE) PER 1000 UNITS: Performed by: INTERNAL MEDICINE

## 2023-08-30 PROCEDURE — C1894 INTRO/SHEATH, NON-LASER: HCPCS | Performed by: INTERNAL MEDICINE

## 2023-08-30 PROCEDURE — C1887 CATHETER, GUIDING: HCPCS | Performed by: INTERNAL MEDICINE

## 2023-08-30 PROCEDURE — 25010000002 FENTANYL CITRATE (PF) 100 MCG/2ML SOLUTION: Performed by: INTERNAL MEDICINE

## 2023-08-30 PROCEDURE — 94761 N-INVAS EAR/PLS OXIMETRY MLT: CPT

## 2023-08-30 PROCEDURE — 99152 MOD SED SAME PHYS/QHP 5/>YRS: CPT | Performed by: INTERNAL MEDICINE

## 2023-08-30 PROCEDURE — 85610 PROTHROMBIN TIME: CPT

## 2023-08-30 RX ORDER — LIDOCAINE HYDROCHLORIDE 20 MG/ML
INJECTION, SOLUTION INFILTRATION; PERINEURAL
Status: DISCONTINUED | OUTPATIENT
Start: 2023-08-30 | End: 2023-08-30 | Stop reason: HOSPADM

## 2023-08-30 RX ORDER — MIDAZOLAM HYDROCHLORIDE 2 MG/2ML
INJECTION, SOLUTION INTRAMUSCULAR; INTRAVENOUS
Status: DISCONTINUED | OUTPATIENT
Start: 2023-08-30 | End: 2023-08-30 | Stop reason: HOSPADM

## 2023-08-30 RX ORDER — SODIUM CHLORIDE 0.9 % (FLUSH) 0.9 %
10 SYRINGE (ML) INJECTION AS NEEDED
Status: DISCONTINUED | OUTPATIENT
Start: 2023-08-30 | End: 2023-08-30 | Stop reason: HOSPADM

## 2023-08-30 RX ORDER — FENTANYL CITRATE 50 UG/ML
INJECTION, SOLUTION INTRAMUSCULAR; INTRAVENOUS
Status: DISCONTINUED | OUTPATIENT
Start: 2023-08-30 | End: 2023-08-30 | Stop reason: HOSPADM

## 2023-08-30 RX ORDER — HEPARIN SODIUM 1000 [USP'U]/ML
INJECTION, SOLUTION INTRAVENOUS; SUBCUTANEOUS
Status: DISCONTINUED | OUTPATIENT
Start: 2023-08-30 | End: 2023-08-30 | Stop reason: HOSPADM

## 2023-08-30 RX ORDER — SODIUM CHLORIDE 0.9 % (FLUSH) 0.9 %
10 SYRINGE (ML) INJECTION EVERY 12 HOURS SCHEDULED
Status: DISCONTINUED | OUTPATIENT
Start: 2023-08-30 | End: 2023-08-30 | Stop reason: HOSPADM

## 2023-08-30 RX ORDER — SODIUM CHLORIDE 9 MG/ML
40 INJECTION, SOLUTION INTRAVENOUS AS NEEDED
Status: DISCONTINUED | OUTPATIENT
Start: 2023-08-30 | End: 2023-08-30 | Stop reason: HOSPADM

## 2023-08-30 RX ORDER — NITROGLYCERIN 0.4 MG/1
0.4 TABLET SUBLINGUAL
Status: DISCONTINUED | OUTPATIENT
Start: 2023-08-30 | End: 2023-08-30 | Stop reason: HOSPADM

## 2023-08-30 RX ORDER — VERAPAMIL HYDROCHLORIDE 2.5 MG/ML
INJECTION, SOLUTION INTRAVENOUS
Status: DISCONTINUED | OUTPATIENT
Start: 2023-08-30 | End: 2023-08-30 | Stop reason: HOSPADM

## 2023-08-30 RX ORDER — ACETAMINOPHEN 325 MG/1
650 TABLET ORAL EVERY 4 HOURS PRN
Status: DISCONTINUED | OUTPATIENT
Start: 2023-08-30 | End: 2023-08-30 | Stop reason: HOSPADM

## 2023-08-30 NOTE — Clinical Note
A 6 fr sheath was  inserted with ultrasound guidance into the right radial artery. Post-Care Instructions: Patient instructed to apply ice to reduce swelling.

## 2023-08-30 NOTE — NURSING NOTE
Patient arrived to [facility] for a heart cath. Patient adequate for discharge per MD order. Patient's VSS. Patient's right radial site clean, dry, intact. Patient verbalized understanding of AVS and medication changes (informed pt to hold metformin after receiving contrast dye). Patient able to tolerate oral intake. Patient's IV removed intact. Patient being driven home by family. Patient instructed to contact Dr. Buckner office with questions.

## 2023-08-31 ENCOUNTER — TELEPHONE (OUTPATIENT)
Dept: CARDIOLOGY | Facility: CLINIC | Age: 48
End: 2023-08-31
Payer: COMMERCIAL

## 2023-08-31 NOTE — TELEPHONE ENCOUNTER
----- Message from DELIO Freeman sent at 8/31/2023 11:49 AM EDT -----  Regarding: FW: Normal heart cath  Contact: [phone]        ----- Message -----  From: Clara Hoffmann MA  Sent: 8/31/2023  10:36 AM EDT  To: DELIO Freeman  Subject: FW: Normal heart cath                            Vicenta, should this pt keep the appt on 9/5/2023?  She is inquiring about what the next step should be.  Please advise, thank you.   ----- Message -----  From: Katy Denise  Sent: 8/31/2023   9:58 AM EDT  To: [facility] [place] [facility] [place]  Subject: Normal heart cath                                Since my cath was normal I was wondering what the next step is. Do I make another appt with you or before the one on the 5th of September? Thank you!

## 2023-09-05 ENCOUNTER — OFFICE VISIT (OUTPATIENT)
Dept: CARDIOLOGY | Facility: CLINIC | Age: 48
End: 2023-09-05
Payer: COMMERCIAL

## 2023-09-05 VITALS
WEIGHT: 204.8 LBS | HEART RATE: 89 BPM | BODY MASS INDEX: 32.92 KG/M2 | HEIGHT: 66 IN | SYSTOLIC BLOOD PRESSURE: 112 MMHG | DIASTOLIC BLOOD PRESSURE: 89 MMHG

## 2023-09-05 DIAGNOSIS — R42 DIZZINESS: Primary | ICD-10-CM

## 2023-09-05 DIAGNOSIS — R00.2 PALPITATIONS: ICD-10-CM

## 2023-09-05 PROCEDURE — 99214 OFFICE O/P EST MOD 30 MIN: CPT

## 2023-09-05 NOTE — PROGRESS NOTES
"Chief Complaint  V-tach, Hypertension, Hyperlipidemia, and Follow-up    Subjective        History of Present Illness  Katy Denise presents to [facility] CARDIOLOGY for follow up. Patient is a 48-year-old  female with past medical history outlined below, significant for type 2 diabetes, hyperlipidemia who presents for follow-up.  She continues to experience dizziness while standing and sometimes going from lying to standing position.  She notes associated shortness of breath that occurs with her dizziness.  He also has occasional sporadic palpitations which only last a few seconds.  Otherwise she feels well.  She notes no edema or actual syncopal episodes.       Past Medical History:   Diagnosis Date    Allergic rhinitis due to allergen     Anxiety disorder     Arthritis     Chest pain     NONE CURRENT, OCC SOAE    Diabetes mellitus, type 2     GERD (gastroesophageal reflux disease)     Hyperlipidemia     Migraine        ALLERGY  Allergies   Allergen Reactions    Tomato Anaphylaxis    Latex Rash    Penicillins Unknown - Low Severity     \"My mother was very allergic.\"  \"My mother was very allergic.\"          Past Surgical History:   Procedure Laterality Date    CARDIAC CATHETERIZATION Left 8/30/2023    Procedure: Left Heart Cath with possible coronary angioplasty;  Surgeon: Mariam Buckner MD;  Location: [facility] CATH INVASIVE LOCATION;  Service: Cardiology;  Laterality: Left;    CARPAL TUNNEL RELEASE Bilateral     COLONOSCOPY      COLONOSCOPY N/A 09/08/2022    Procedure: COLONOSCOPY with cold snare polypectomy;  Surgeon: Keshawn Yates MD;  Location: [facility] ENDOSCOPY;  Service: General;  Laterality: N/A;  colon polyp    ENDOSCOPY      HYSTERECTOMY      LUMBAR SPINE SURGERY      Microdiscectomy of thoracic or lumbar spine    TUBAL ABDOMINAL LIGATION          Social History     Socioeconomic History    Marital status:    Tobacco Use    Smoking status: Former     Packs/day: " 0.25     Years: 15.00     Pack years: 3.75     Types: Cigarettes     Quit date: 2009     Years since quittin.6    Smokeless tobacco: Never    Tobacco comments:     off and on for 30 years; .23ppd   Vaping Use    Vaping Use: Never used   Substance and Sexual Activity    Alcohol use: Yes     Alcohol/week: 2.0 standard drinks     Types: 2 Drinks containing 0.5 oz of alcohol per week     Comment: occ    Drug use: Not Currently     Types: Marijuana    Sexual activity: Defer     Partners: Male     Birth control/protection: Vasectomy, Hysterectomy       Family History   Problem Relation Age of Onset    Stroke Other     Heart disease Other     Liver disease Other         cirrhosis    Diabetes Other     Heart disease Mother     Arrhythmia Maternal Grandfather     Heart attack Maternal Grandfather     Heart disease Maternal Grandfather     Heart disease Maternal Grandmother     Malig Hyperthermia Neg Hx         Current Outpatient Medications on File Prior to Visit   Medication Sig    busPIRone (BUSPAR) 7.5 MG tablet TAKE 1 TABLET BY MOUTH TWICE DAILY AS NEEDED FOR ANXIETY    diphenhydrAMINE (BENADRYL) 25 mg capsule Take 1 capsule by mouth Every 6 (Six) Hours As Needed.    EPINEPHrine (EPIPEN) 0.3 MG/0.3ML solution auto-injector injection 0.3 mL.    Erenumab-aooe (AIMOVIG) 140 MG/ML auto-injector Inject 1 mL under the skin into the appropriate area as directed Every 30 (Thirty) Days.    escitalopram (LEXAPRO) 20 MG tablet TAKE 1 TABLET BY MOUTH DAILY    hyoscyamine (ANASPAZ,LEVSIN) 0.125 MG tablet Take 1 tablet by mouth Every 6 (Six) Hours As Needed.    losartan (COZAAR) 25 MG tablet TAKE 1 TABLET BY MOUTH DAILY    Melatonin 12 MG tablet Take  by mouth. 2 nightly    metFORMIN (GLUCOPHAGE) 1000 MG tablet TAKE 1 TABLET BY MOUTH TWICE DAILY WITH MEALS (Patient taking differently: Take 1 tablet by mouth 2 (Two) Times a Day With Meals. LAST DOSE 23 AM)    metoprolol succinate XL (TOPROL-XL) 25 MG 24 hr tablet Take 1  "tablet by mouth Daily.    Multiple Vitamins-Minerals (Eye Health) capsule Take  by mouth. 2 daily    ondansetron (Zofran) 4 MG tablet Take 1 tablet by mouth Every 8 (Eight) Hours As Needed for Nausea or Vomiting.    Ozempic, 1 MG/DOSE, 4 MG/3ML solution pen-injector INJECT 1 MG UNDER THE SKIN INTO THE APPROPRIATE AREA ONCE PER WEEK (Patient taking differently: TAKES ON SUNDAY NIGHT)    rosuvastatin (CRESTOR) 5 MG tablet TAKE 1 TABLET BY MOUTH EVERY NIGHT     No current facility-administered medications on file prior to visit.       Objective   Vitals:    09/05/23 1449   BP: 112/89   Pulse: 89   Weight: 92.9 kg (204 lb 12.8 oz)   Height: 167.6 cm (66\")       Physical Exam  Constitutional:       General: She is awake. She is not in acute distress.     Appearance: Normal appearance.   HENT:      Head: Normocephalic.      Nose: Nose normal. No congestion.   Eyes:      Extraocular Movements: Extraocular movements intact.      Conjunctiva/sclera: Conjunctivae normal.      Pupils: Pupils are equal, round, and reactive to light.   Neck:      Thyroid: No thyromegaly.      Vascular: No JVD.   Cardiovascular:      Rate and Rhythm: Normal rate and regular rhythm.      Chest Wall: PMI is not displaced.      Pulses: Normal pulses.      Heart sounds: Normal heart sounds, S1 normal and S2 normal. No murmur heard.    No friction rub. No gallop. No S3 or S4 sounds.   Pulmonary:      Effort: Pulmonary effort is normal.      Breath sounds: Normal breath sounds. No wheezing, rhonchi or rales.   Abdominal:      General: Bowel sounds are normal.      Palpations: Abdomen is soft.      Tenderness: There is no abdominal tenderness.   Musculoskeletal:      Cervical back: No tenderness.      Right lower leg: No edema.      Left lower leg: No edema.   Lymphadenopathy:      Cervical: No cervical adenopathy.   Skin:     General: Skin is warm and dry.      Capillary Refill: Capillary refill takes less than 2 seconds.      Coloration: Skin is not " cyanotic.      Findings: No petechiae or rash.      Nails: There is no clubbing.   Neurological:      Mental Status: She is alert.   Psychiatric:         Mood and Affect: Mood normal.         Behavior: Behavior is cooperative.         Result Review     The following data was reviewed by DELIO Mcwilliams on 09/05/23.    No results found for: PROBNP  CMP          4/27/2023    07:34 7/18/2023    13:48 8/30/2023    10:58   CMP   Glucose 116  245  130    BUN 13  12  11    Creatinine 0.45  0.80  0.57    EGFR 120.3  92.2  113.7    Sodium 139  139  137    Potassium 4.0  3.8  4.2    Chloride 102  99  102    Calcium 9.0  9.9  9.8    Total Protein 7.3  7.8     Albumin 4.6  4.7     Globulin 2.7  3.1     Total Bilirubin 0.4  0.6     Alkaline Phosphatase 42  60     AST (SGOT) 19  25     ALT (SGPT) 23  30     Albumin/Globulin Ratio 1.7  1.5     BUN/Creatinine Ratio 28.9  15.0  19.3    Anion Gap 11.0  16.0  13.9      CBC w/diff          4/27/2023    07:34 7/18/2023    13:48 8/30/2023    10:58   CBC w/Diff   WBC 6.47  5.79  6.40    RBC 4.45  4.75  4.58    Hemoglobin 13.9  14.9  14.7    Hematocrit 41.7  43.6  41.6    MCV 93.7  91.8  90.8    MCH 31.2  31.4  32.1    MCHC 33.3  34.2  35.3    RDW 13.1  13.1  12.4    Platelets 245  240  258    Neutrophil Rel % 51.4  53.2     Immature Granulocyte Rel % 0.3  0.5     Lymphocyte Rel % 37.1  32.6     Monocyte Rel % 9.0  10.0     Eosinophil Rel % 1.4  2.8     Basophil Rel % 0.8  0.9        Lipid Panel          4/27/2023    07:34   Lipid Panel   Total Cholesterol 124    Triglycerides 128    HDL Cholesterol 65    VLDL Cholesterol 22    LDL Cholesterol  37    LDL/HDL Ratio 0.51        Results for orders placed in visit on 08/08/23    Adult Transthoracic Echo Complete W/ Cont if Necessary Per Protocol    Interpretation Summary    Left ventricular systolic function is normal. Calculated left ventricular EF = 61.5%    Left ventricular diastolic function was normal.    No hemodynamically  significant valvular pathology.    Results for orders placed during the hospital encounter of 08/28/23    Stress Test With Myocardial Perfusion One Day    Interpretation Summary  Adequate aerobic functional capacity.  Patient reported chest discomfort and shortness of breath during the test, mostly during stage III of the study.  No significant arrhythmias were noted.  Normal blood pressure and heart rate response to exercise.  Baseline ECG showed normal sinus rhythm.  At peak stress, wide-complex tachycardia was noted, more likely rate related left bundle branch block.  Gated and SPECT images were reviewed.  Image quality is adequate.  Attenuation artifact is present.  There is a small area of mild perfusion defect in the distal anterior wall without significant reversibility on resting images, more likely related to attenuation artifact.  Medium area of moderate perfusion defect in the septal segment was noted without significant reversibility, more likely related to attenuation artifact.  The left ventricle was normal in size with a calculated ejection fraction exceeding 70%.  No wall motion abnormalities were noted.  Correlate these findings with the clinical picture.         Procedures    Assessment & Plan  Diagnoses and all orders for this visit:    1. Dizziness (Primary)  -     Ambulatory Referral to Cardiac Electrophysiology  -     Holter Monitor - 72 Hour Up To 15 Days; Future    2. Palpitations  -     Ambulatory Referral to Cardiac Electrophysiology  -     Holter Monitor - 72 Hour Up To 15 Days; Future    1.  She continues with episodes of dizziness which can occur just standing still.  She had a recent stress test which showed exercise-induced wide-complex tachycardia.  She has been referred to electrophysiology for possible ablation.  In the meantime we will place a 7-day Holter monitor to try to catch any other arrhythmias which may be contributing to her dizziness.  She was encouraged to increase her  water intake significantly continue metoprolol 25 mg daily.  Of note recent left heart catheterization demonstrated normal coronary arteries.  2.  Holter monitor and EP referral as above.    Follow Up   Return in about 6 weeks (around 10/17/2023) for With .    Patient was given instructions and counseling regarding her condition or for health maintenance advice. Please see specific information pulled into the AVS if appropriate.     Vicenta Altman, DELIO  09/05/23  15:02 EDT    Dictated Utilizing Dragon Dictation

## 2023-09-08 ENCOUNTER — TELEPHONE (OUTPATIENT)
Dept: FAMILY MEDICINE CLINIC | Facility: CLINIC | Age: 48
End: 2023-09-08
Payer: COMMERCIAL

## 2023-09-08 DIAGNOSIS — E11.9 TYPE 2 DIABETES MELLITUS WITHOUT COMPLICATION, WITHOUT LONG-TERM CURRENT USE OF INSULIN: Primary | ICD-10-CM

## 2023-09-08 NOTE — TELEPHONE ENCOUNTER
----- Message from Katy Denise sent at 9/8/2023 12:33 PM EDT -----  Regarding: A1C  Contact: [phone]  Can you please put in the order for my A1C check. I went to the lab and they said there wasn't one. Thanks!

## 2023-09-15 ENCOUNTER — LAB (OUTPATIENT)
Dept: LAB | Facility: HOSPITAL | Age: 48
End: 2023-09-15
Payer: COMMERCIAL

## 2023-09-15 DIAGNOSIS — E11.9 TYPE 2 DIABETES MELLITUS WITHOUT COMPLICATION, WITHOUT LONG-TERM CURRENT USE OF INSULIN: ICD-10-CM

## 2023-09-15 LAB — HBA1C MFR BLD: 6.7 % (ref 4.8–5.6)

## 2023-09-15 PROCEDURE — 36415 COLL VENOUS BLD VENIPUNCTURE: CPT

## 2023-09-15 PROCEDURE — 83036 HEMOGLOBIN GLYCOSYLATED A1C: CPT

## 2023-09-17 DIAGNOSIS — E11.65 TYPE 2 DIABETES MELLITUS WITH HYPERGLYCEMIA, WITHOUT LONG-TERM CURRENT USE OF INSULIN: Primary | ICD-10-CM

## 2023-10-04 DIAGNOSIS — R11.0 NAUSEA: ICD-10-CM

## 2023-10-05 RX ORDER — ONDANSETRON 4 MG/1
TABLET, FILM COATED ORAL
Qty: 24 TABLET | Refills: 1 | Status: SHIPPED | OUTPATIENT
Start: 2023-10-05

## 2023-10-10 ENCOUNTER — OFFICE VISIT (OUTPATIENT)
Dept: CARDIOLOGY | Facility: CLINIC | Age: 48
End: 2023-10-10
Payer: COMMERCIAL

## 2023-10-10 VITALS
BODY MASS INDEX: 32.62 KG/M2 | DIASTOLIC BLOOD PRESSURE: 68 MMHG | HEIGHT: 66 IN | WEIGHT: 203 LBS | OXYGEN SATURATION: 97 % | SYSTOLIC BLOOD PRESSURE: 110 MMHG | HEART RATE: 85 BPM

## 2023-10-10 DIAGNOSIS — R00.0 WIDE-COMPLEX TACHYCARDIA: Primary | ICD-10-CM

## 2023-10-10 DIAGNOSIS — R42 POSTURAL DIZZINESS WITH PRESYNCOPE: ICD-10-CM

## 2023-10-10 DIAGNOSIS — R55 POSTURAL DIZZINESS WITH PRESYNCOPE: ICD-10-CM

## 2023-10-10 NOTE — PROGRESS NOTES
Electrophysiology Clinic Consult     Katy Denise  [id number]  1975    Referring Provider: Vicenta Altman*   PCP: Nataliya Obrien, APRN  [address] / [place] [address]    Date of Service: 10/10/23    Chief Complaint   Patient presents with    V-tach    Dizziness     Problem List  WCT  Exercise induced on stress test  48 hr holter: min 60 /avg 82/max 135 bpm, rare PACs, pt correlated events were sinus tach  Echocardiogram 08/08/2023: EF 61.5%, trace IA  Nuclear GXT 8/28/23: EF 70%, perfusion defects noted most likely to attenuation artifact, WCT more likely rate related LBBB  LHC 8/30/2023:normal cors; BH Prasad  7 day event monitor: min 75/avg 100/max 154 bpm, no significant findings, pt triggered events correlating with SR/ST  Dizziness  HTN  DM II  HLD  Obesity  Migraine  Anxiety  GERD  Carpal tunnel  Surgical hx  Hysterectomy  Carpal tunnel release (bilateral)  Lumbar spine    History of Present Illness  Katy Denise is a 48 y.o. female who presents to my electrophysiology clinic for evaluation of syncope and wide-complex tachycardia.  Patient has been having presyncopal episodes when she is outside working with her kids-usually in warm environment.  She also noticed some presyncopal episode when she changes her position from supine to standing.  She underwent stress testing which showed wide-complex tachycardia.  It also appears that it initiates with Dawna phenomenon. TTE and LHC have been unremarkable.     Review of Systems   Constitutional:  Negative for activity change, fatigue and fever.   Respiratory:  Negative for chest tightness and shortness of breath.    Cardiovascular:  Negative for chest pain, palpitations and leg swelling.   Gastrointestinal:  Negative for constipation and diarrhea.   Genitourinary:  Negative for decreased urine volume and difficulty urinating.   Skin:  Negative for wound.   Neurological:  Negative for dizziness, syncope, weakness and  "light-headedness.   Psychiatric/Behavioral:  Negative for suicidal ideas.        Outpatient Medications Marked as Taking for the 10/10/23 encounter (Office Visit) with Valentín Mcneil MD   Medication Sig Dispense Refill    busPIRone (BUSPAR) 7.5 MG tablet TAKE 1 TABLET BY MOUTH TWICE DAILY AS NEEDED FOR ANXIETY 60 tablet 2    diphenhydrAMINE (BENADRYL) 25 mg capsule Take 1 capsule by mouth Every 6 (Six) Hours As Needed.      EPINEPHrine (EPIPEN) 0.3 MG/0.3ML solution auto-injector injection 0.3 mL.      Erenumab-aooe (AIMOVIG) 140 MG/ML auto-injector Inject 1 mL under the skin into the appropriate area as directed Every 30 (Thirty) Days. 3 mL 1    escitalopram (LEXAPRO) 20 MG tablet TAKE 1 TABLET BY MOUTH DAILY 90 tablet 1    hyoscyamine (ANASPAZ,LEVSIN) 0.125 MG tablet Take 1 tablet by mouth Every 6 (Six) Hours As Needed.      losartan (COZAAR) 25 MG tablet TAKE 1 TABLET BY MOUTH DAILY 90 tablet 1    Melatonin 12 MG tablet Take  by mouth. 2 nightly      metFORMIN (GLUCOPHAGE) 1000 MG tablet TAKE 1 TABLET BY MOUTH TWICE DAILY WITH MEALS (Patient taking differently: Take 1 tablet by mouth 2 (Two) Times a Day With Meals. LAST DOSE 8/29/23 AM) 180 tablet 1    metoprolol succinate XL (TOPROL-XL) 25 MG 24 hr tablet Take 1 tablet by mouth Daily. 90 tablet 3    Multiple Vitamins-Minerals (Eye Health) capsule Take  by mouth. 2 daily      ondansetron (ZOFRAN) 4 MG tablet TAKE 1 TABLET BY MOUTH EVERY 8 HOURS AS NEEDED FOR NAUSEA OR VOMITING 24 tablet 1    Ozempic, 1 MG/DOSE, 4 MG/3ML solution pen-injector INJECT 1 MG UNDER THE SKIN INTO THE APPROPRIATE AREA ONCE PER WEEK (Patient taking differently: TAKES ON SUNDAY NIGHT) 12 mL 1    rosuvastatin (CRESTOR) 5 MG tablet TAKE 1 TABLET BY MOUTH EVERY NIGHT 90 tablet 1       Physical Exam  Vitals:    10/10/23 1029   BP: 110/68   BP Location: Left arm   Patient Position: Sitting   Pulse: 85   SpO2: 97%   Weight: 92.1 kg (203 lb)   Height: 167.6 cm (66\")     GENERAL: Well-developed, " well-nourished patient in no acute distress.  HEENT: NC, AC, PERRLA. MMM  NECK: No JVD. No carotid bruits auscultated.  LUNGS: Clear to auscultation bilaterally.  CARDIOVASCULAR: RRR No murmurs, gallops or rubs noted.   ABDOMEN: Soft, nontender. Positive bowel sounds.  MUSCULOSKELETAL: No gross deformities. No clubbing, cyanosis  EXT: pulses intact, No edema  SKIN: Pink, warm  Neuro: Nonfocal exam. Gait intact    Diagnostic Data    ECG 12 Lead    Date/Time: 10/10/2023 11:02 AM  Performed by: Valentín Mcneil MD    Authorized by: Valentín Mcneil MD  Comparison: not compared with previous ECG   Rhythm: sinus rhythm  Rate: normal  Conduction: conduction normal  QRS axis: normal  Other: no other findings    Clinical impression: normal ECG          Lab Results   Component Value Date    GLUCOSE 130 (H) 08/30/2023    CALCIUM 9.8 08/30/2023     08/30/2023    K 4.2 08/30/2023    CO2 21.1 (L) 08/30/2023     08/30/2023    BUN 11 08/30/2023    CREATININE 0.57 08/30/2023    BCR 19.3 08/30/2023    ANIONGAP 13.9 08/30/2023     Lab Results   Component Value Date    WBC 6.40 08/30/2023    HGB 14.7 08/30/2023    HCT 41.6 08/30/2023    MCV 90.8 08/30/2023     08/30/2023     Lab Results   Component Value Date    INR 0.90 08/30/2023    PROTIME 12.2 08/30/2023     Lab Results   Component Value Date    TSH 0.832 07/18/2023       Cardiac Testing:  TTE 7/2023: EF 62%    I personally viewed and interpreted the patient's EKG/Telemetry/lab data      Assessment and Plan   Diagnoses and all orders for this visit:    1. Wide-complex tachycardia (Primary)  Assessment & Plan:  WCT during exercise MPI. Upon further review, it appears to be rate related bundle branch block; at the end of the tracing there was a PVC that cause narrowing of QRS which signifies a 'peelback phenomenon'. Initiation also appears to have started with Dawna's phenomenon (Long Short sequence). These are all physiologic  - TTE and [facility] unremarkable  - Event monitor  correlates her symptoms with ST  - no need for ICD or invasive testing      2. Postural dizziness with presyncope  Assessment & Plan:  Appears to be orthostatic/vasovagal  - discussed lifestyle modification including compression stockings, changing positions slowly, and aggressive hydration (liquid with electrolytes/salt)      Other orders  -     ECG 12 Lead      Body mass index is 32.77 kg/mý.        Follow Up  Return if symptoms worsen or fail to improve.    Thank you for allowing me to participate in the care of your patient. Please to not hesitate to contact me with additional questions or concerns.        Valentín Mcneil MD  Cardiac Electrophysiologist  [facility] / [facility]

## 2023-10-10 NOTE — ASSESSMENT & PLAN NOTE
WCT during exercise MPI. Upon further review, it appears to be rate related bundle branch block; at the end of the tracing there was a PVC that cause narrowing of QRS which signifies a 'peelback phenomenon'. Initiation also appears to have started with Dawna's phenomenon (Long Short sequence).   - TTE and LHC unremarkable  - Event monitor correlates her symptoms with ST  - no need for ICD or invasive testing

## 2023-10-10 NOTE — ASSESSMENT & PLAN NOTE
Appears to be orthostatic/vasovagal  - discussed lifestyle modification including compression stockings, changing positions slowly, and aggressive hydration (liquid with electrolytes/salt)

## 2023-10-13 ENCOUNTER — OFFICE VISIT (OUTPATIENT)
Dept: CARDIOLOGY | Facility: CLINIC | Age: 48
End: 2023-10-13
Payer: COMMERCIAL

## 2023-10-13 VITALS
HEART RATE: 98 BPM | DIASTOLIC BLOOD PRESSURE: 74 MMHG | HEIGHT: 66 IN | WEIGHT: 212 LBS | BODY MASS INDEX: 34.07 KG/M2 | SYSTOLIC BLOOD PRESSURE: 126 MMHG

## 2023-10-13 DIAGNOSIS — R29.818 SUSPECTED SLEEP APNEA: ICD-10-CM

## 2023-10-13 DIAGNOSIS — I10 ESSENTIAL HYPERTENSION: ICD-10-CM

## 2023-10-13 DIAGNOSIS — R00.0 WIDE-COMPLEX TACHYCARDIA: Primary | ICD-10-CM

## 2023-10-13 DIAGNOSIS — E78.2 MIXED DYSLIPIDEMIA: ICD-10-CM

## 2023-10-13 PROCEDURE — 99214 OFFICE O/P EST MOD 30 MIN: CPT | Performed by: INTERNAL MEDICINE

## 2023-10-13 RX ORDER — METOPROLOL SUCCINATE 50 MG/1
50 TABLET, EXTENDED RELEASE ORAL DAILY
Qty: 90 TABLET | Refills: 3 | Status: SHIPPED | OUTPATIENT
Start: 2023-10-13

## 2023-10-13 NOTE — PROGRESS NOTES
Chief Complaint  Palpitations, Shortness of Breath, and Excessive Sweating    Subjective      Patient is here for follow-up on palpitations.  She has wide-complex tachycardia that was noted during cardiac stress test and was thought to be caused by rate related bundle branch block per EP.  She had cardiac catheterization done which was unremarkable.  LVEF is within normal limits.  She continues to have occasional palpitations and tachycardia, mostly while standing.  She has sporadic dizziness.  She denies presyncope or syncope.  She has no angina, significant dyspnea or other complaints.    Past Medical History:   Diagnosis Date    Allergic rhinitis due to allergen     Anxiety disorder     Arthritis     Chest pain     NONE CURRENT, OCC SOAE    Diabetes mellitus, type 2     GERD (gastroesophageal reflux disease)     Hyperlipidemia     Migraine     Wide-complex tachycardia 10/10/2023         Current Outpatient Medications:     busPIRone (BUSPAR) 7.5 MG tablet, TAKE 1 TABLET BY MOUTH TWICE DAILY AS NEEDED FOR ANXIETY, Disp: 60 tablet, Rfl: 2    diphenhydrAMINE (BENADRYL) 25 mg capsule, Take 1 capsule by mouth Every 6 (Six) Hours As Needed., Disp: , Rfl:     EPINEPHrine (EPIPEN) 0.3 MG/0.3ML solution auto-injector injection, 0.3 mL., Disp: , Rfl:     Erenumab-aooe (AIMOVIG) 140 MG/ML auto-injector, Inject 1 mL under the skin into the appropriate area as directed Every 30 (Thirty) Days., Disp: 3 mL, Rfl: 1    escitalopram (LEXAPRO) 20 MG tablet, TAKE 1 TABLET BY MOUTH DAILY, Disp: 90 tablet, Rfl: 1    hyoscyamine (ANASPAZ,LEVSIN) 0.125 MG tablet, Take 1 tablet by mouth Every 6 (Six) Hours As Needed., Disp: , Rfl:     losartan (COZAAR) 25 MG tablet, TAKE 1 TABLET BY MOUTH DAILY, Disp: 90 tablet, Rfl: 1    Melatonin 12 MG tablet, Take  by mouth. 2 nightly, Disp: , Rfl:     metFORMIN (GLUCOPHAGE) 1000 MG tablet, TAKE 1 TABLET BY MOUTH TWICE DAILY WITH MEALS (Patient taking differently: Take 1 tablet by mouth 2 (Two) Times a  "Day With Meals. LAST DOSE 23 AM), Disp: 180 tablet, Rfl: 1    metoprolol succinate XL (TOPROL-XL) 50 MG 24 hr tablet, Take 1 tablet by mouth Daily., Disp: 90 tablet, Rfl: 3    Multiple Vitamins-Minerals (Eye Health) capsule, Take  by mouth. 2 daily, Disp: , Rfl:     ondansetron (ZOFRAN) 4 MG tablet, TAKE 1 TABLET BY MOUTH EVERY 8 HOURS AS NEEDED FOR NAUSEA OR VOMITING, Disp: 24 tablet, Rfl: 1    Ozempic, 1 MG/DOSE, 4 MG/3ML solution pen-injector, INJECT 1 MG UNDER THE SKIN INTO THE APPROPRIATE AREA ONCE PER WEEK (Patient taking differently: TAKES ON  NIGHT), Disp: 12 mL, Rfl: 1    rosuvastatin (CRESTOR) 5 MG tablet, TAKE 1 TABLET BY MOUTH EVERY NIGHT, Disp: 90 tablet, Rfl: 1    Medications Discontinued During This Encounter   Medication Reason    metoprolol succinate XL (TOPROL-XL) 25 MG 24 hr tablet Reorder     Allergies   Allergen Reactions    Tomato Anaphylaxis    Latex Rash    Penicillins Unknown - Low Severity     \"My mother was very allergic.\"  \"My mother was very allergic.\"          Social History     Tobacco Use    Smoking status: Former     Packs/day: 0.25     Years: 15.00     Additional pack years: 0.00     Total pack years: 3.75     Types: Cigarettes     Quit date: 2009     Years since quittin.7    Smokeless tobacco: Never    Tobacco comments:     off and on for 30 years; .23ppd   Vaping Use    Vaping Use: Never used   Substance Use Topics    Alcohol use: Yes     Alcohol/week: 2.0 standard drinks of alcohol     Types: 2 Drinks containing 0.5 oz of alcohol per week     Comment: occ    Drug use: Not Currently     Types: Marijuana       Family History   Problem Relation Age of Onset    Stroke Other     Heart disease Other     Liver disease Other         cirrhosis    Diabetes Other     Heart disease Mother     Arrhythmia Maternal Grandfather     Heart attack Maternal Grandfather     Heart disease Maternal Grandfather     Heart disease Maternal Grandmother     Malig Hyperthermia Neg Hx     " "    Objective     /74   Pulse 98   Ht 167.6 cm (65.98\")   Wt 96.2 kg (212 lb)   BMI 34.23 kg/mý       Physical Exam    General Appearance:   no acute distress  Alert and oriented x3  HENT:   lips not cyanotic  Atraumatic  Neck:  No jvd   supple  Respiratory:  no respiratory distress  normal breath sounds  no rales  Cardiovascular:  no S3, no S4   no murmur  no rub  Extremities  No cyanosis  lower extremity edema: none    Skin:   warm, dry  No rashes      Result Review :     No results found for: \"PROBNP\"  CMP          4/27/2023    07:34 7/18/2023    13:48 8/30/2023    10:58   CMP   Glucose 116  245  130    BUN 13  12  11    Creatinine 0.45  0.80  0.57    EGFR 120.3  92.2  113.7    Sodium 139  139  137    Potassium 4.0  3.8  4.2    Chloride 102  99  102    Calcium 9.0  9.9  9.8    Total Protein 7.3  7.8     Albumin 4.6  4.7     Globulin 2.7  3.1     Total Bilirubin 0.4  0.6     Alkaline Phosphatase 42  60     AST (SGOT) 19  25     ALT (SGPT) 23  30     Albumin/Globulin Ratio 1.7  1.5     BUN/Creatinine Ratio 28.9  15.0  19.3    Anion Gap 11.0  16.0  13.9      CBC w/diff          4/27/2023    07:34 7/18/2023    13:48 8/30/2023    10:58   CBC w/Diff   WBC 6.47  5.79  6.40    RBC 4.45  4.75  4.58    Hemoglobin 13.9  14.9  14.7    Hematocrit 41.7  43.6  41.6    MCV 93.7  91.8  90.8    MCH 31.2  31.4  32.1    MCHC 33.3  34.2  35.3    RDW 13.1  13.1  12.4    Platelets 245  240  258    Neutrophil Rel % 51.4  53.2     Immature Granulocyte Rel % 0.3  0.5     Lymphocyte Rel % 37.1  32.6     Monocyte Rel % 9.0  10.0     Eosinophil Rel % 1.4  2.8     Basophil Rel % 0.8  0.9        Lab Results   Component Value Date    TSH 0.832 07/18/2023      Lab Results   Component Value Date    FREET4 1.04 04/27/2023      No results found for: \"DDIMERQUANT\"  Magnesium   Date Value Ref Range Status   07/18/2023 1.6 1.6 - 2.6 mg/dL Final      No results found for: \"DIGOXIN\"   Lab Results   Component Value Date    TROPONINT <6 " "07/18/2023           Lipid Panel          4/27/2023    07:34   Lipid Panel   Total Cholesterol 124    Triglycerides 128    HDL Cholesterol 65    VLDL Cholesterol 22    LDL Cholesterol  37    LDL/HDL Ratio 0.51      No results found for: \"POCTROP\"    Results for orders placed in visit on 08/08/23    Adult Transthoracic Echo Complete W/ Cont if Necessary Per Protocol    Interpretation Summary    Left ventricular systolic function is normal. Calculated left ventricular EF = 61.5%    Left ventricular diastolic function was normal.    No hemodynamically significant valvular pathology.                 Diagnoses and all orders for this visit:    1. Wide-complex tachycardia (Primary)    2. Essential hypertension    3. Mixed dyslipidemia    4. Suspected sleep apnea  -     Ambulatory Referral to Sleep Medicine    Other orders  -     metoprolol succinate XL (TOPROL-XL) 50 MG 24 hr tablet; Take 1 tablet by mouth Daily.  Dispense: 90 tablet; Refill: 3      Assessment:    Wide-complex tachycardia: She was recently evaluated by EP who thought she has rate related bundle branch block.  LVEF is normal.  Cardiac catheterization was unremarkable.  Continue current medications and clinical monitoring.    Palpitations: She has rare palpitations, likely related to activity as documented by recent Holter monitor.  Metoprolol dose will be increased to 50 mg daily to help with her palpitations.  Adequate hydration and avoidance of excessive caffeine or other stimulants is recommended.    Essential hypertension: Stable on current regimen.    Mixed dyslipidemia: On rosuvastatin.  Continue same.    Suspected sleep apnea: She has multiple symptoms suggestive of sleep disordered breathing.  She will be referred to sleep medicine for further evaluation.      Follow Up     Return in about 3 months (around 1/13/2024) for With Vicenta KEBEDE.        Patient was given instructions and counseling regarding her condition or for health " maintenance advice. Please see specific information pulled into the AVS if appropriate.

## 2023-11-17 ENCOUNTER — OFFICE VISIT (OUTPATIENT)
Dept: SLEEP MEDICINE | Facility: HOSPITAL | Age: 48
End: 2023-11-17
Payer: COMMERCIAL

## 2023-11-17 ENCOUNTER — TELEPHONE (OUTPATIENT)
Dept: FAMILY MEDICINE CLINIC | Facility: CLINIC | Age: 48
End: 2023-11-17

## 2023-11-17 VITALS
HEIGHT: 66 IN | OXYGEN SATURATION: 96 % | HEART RATE: 81 BPM | SYSTOLIC BLOOD PRESSURE: 106 MMHG | DIASTOLIC BLOOD PRESSURE: 61 MMHG | BODY MASS INDEX: 32.3 KG/M2 | WEIGHT: 201 LBS

## 2023-11-17 DIAGNOSIS — R00.0 WIDE-COMPLEX TACHYCARDIA: ICD-10-CM

## 2023-11-17 DIAGNOSIS — G47.33 OBSTRUCTIVE SLEEP APNEA, ADULT: Primary | ICD-10-CM

## 2023-11-17 DIAGNOSIS — Z79.4 TYPE 2 DIABETES MELLITUS WITHOUT COMPLICATION, WITH LONG-TERM CURRENT USE OF INSULIN: ICD-10-CM

## 2023-11-17 DIAGNOSIS — E11.9 TYPE 2 DIABETES MELLITUS WITHOUT COMPLICATION, WITH LONG-TERM CURRENT USE OF INSULIN: ICD-10-CM

## 2023-11-17 DIAGNOSIS — G90.A POTS (POSTURAL ORTHOSTATIC TACHYCARDIA SYNDROME): ICD-10-CM

## 2023-11-17 PROCEDURE — G0463 HOSPITAL OUTPT CLINIC VISIT: HCPCS

## 2023-11-17 NOTE — TELEPHONE ENCOUNTER
Caller: Katy Denise    Relationship: Self    Best call back number: [phone]    What is the medical concern/diagnosis: NEUROLOGY    What specialty or service is being requested: POSSIBLE DEAN DIAGNOSIS    What is the provider, practice or medical service name: [facility], ANYONE BUT [facility]

## 2023-11-17 NOTE — PROGRESS NOTES
Reason for Consultation: Sleep apnea        Patient Care Team:  Nataliya Obrien APRN as PCP - General (Nurse Practitioner)  Marguerite Joshi APRN as Nurse Practitioner (Nurse Practitioner)  Uma Goldsmith MD, MPH as Consulting Physician (Sleep Medicine)      History of present illness:    Thank you for asking me to see your patient.  The patient is a 48 y.o. female is at the visit alone.  She is developed postural tachycardia without hypotension.  She is also developed according to her cardiologist wide-complex tachycardia    She tells me that she has fatigue that is constant and sense of lightheadedness and confusion.  She is diabetic and has had the above-noted pots and tachycardia.  She has not ever had evaluation for peripheral neuropathy or autonomic neuropathy.  She is never had a sleep study.  Her , when she was sleeping with him said she snores sometimes but she observes she is a very sound sleeper and goes to sleep very quickly.  Her history of present illness points to excessive daytime sleepiness and snort and coughing arousals.  She does sometimes have discomfort in her legs at night and does have some pain that causes her to feel that she has trouble sleeping.  She gets up and goes to the bathroom 2 times per night and has difficulty staying asleep during the night.  He is on medicine for hypercholesterolemia, diabetes and the tachycardia.  She did use tobacco from age [age] and has 1-2 alcoholic drinks per week.  Other symptoms and review of systems include frequent urination neck pain TMJ pain irregular heartbeat swollen ankles cough shortness of breath dizziness anxiety depression, frequent heartburn, abdominal bloating, excessive thirst and easy bruising.    Wide-complex tachycardia: She was recently evaluated by EP who thought she has rate related bundle branch block.  LVEF is normal.  Cardiac catheterization was unremarkable.       Lima: 16    Data Reviewed: Viewed her questionnaire  cardiology notes.      PMH:  Past Medical History:   Diagnosis Date    Allergic rhinitis due to allergen     Anxiety disorder     Arthritis     Chest pain     NONE CURRENT, OCC SOAE    Depression     Diabetes mellitus, type 2     Essential hypertension 06/22/2021    GERD (gastroesophageal reflux disease)     Hyperlipidemia     Migraine     Wide-complex tachycardia 10/10/2023          Allergies:  Tomato, Latex, and Penicillins     Medication Review:   Current Outpatient Medications on File Prior to Visit   Medication Sig Dispense Refill    busPIRone (BUSPAR) 7.5 MG tablet TAKE 1 TABLET BY MOUTH TWICE DAILY AS NEEDED FOR ANXIETY 60 tablet 2    diphenhydrAMINE (BENADRYL) 25 mg capsule Take 1 capsule by mouth Every 6 (Six) Hours As Needed.      Erenumab-aooe (AIMOVIG) 140 MG/ML auto-injector Inject 1 mL under the skin into the appropriate area as directed Every 30 (Thirty) Days. 3 mL 1    escitalopram (LEXAPRO) 20 MG tablet TAKE 1 TABLET BY MOUTH DAILY 90 tablet 1    hyoscyamine (ANASPAZ,LEVSIN) 0.125 MG tablet Take 1 tablet by mouth Every 6 (Six) Hours As Needed.      losartan (COZAAR) 25 MG tablet TAKE 1 TABLET BY MOUTH DAILY 90 tablet 1    Melatonin 12 MG tablet Take  by mouth. 2 nightly      metoprolol succinate XL (TOPROL-XL) 50 MG 24 hr tablet Take 1 tablet by mouth Daily. 90 tablet 3    ondansetron (ZOFRAN) 4 MG tablet TAKE 1 TABLET BY MOUTH EVERY 8 HOURS AS NEEDED FOR NAUSEA OR VOMITING 24 tablet 1    rosuvastatin (CRESTOR) 5 MG tablet TAKE 1 TABLET BY MOUTH EVERY NIGHT 90 tablet 1    EPINEPHrine (EPIPEN) 0.3 MG/0.3ML solution auto-injector injection 0.3 mL.      metFORMIN (GLUCOPHAGE) 1000 MG tablet TAKE 1 TABLET BY MOUTH TWICE DAILY WITH MEALS (Patient taking differently: Take 1 tablet by mouth 2 (Two) Times a Day With Meals. LAST DOSE 8/29/23 AM) 180 tablet 1    Multiple Vitamins-Minerals (Eye Health) capsule Take  by mouth. 2 daily (Patient not taking: Reported on 11/17/2023)      Ozempic, 1 MG/DOSE, 4  "MG/3ML solution pen-injector INJECT 1 MG UNDER THE SKIN INTO THE APPROPRIATE AREA ONCE PER WEEK (Patient taking differently: TAKES ON SUNDAY NIGHT) 12 mL 1     No current facility-administered medications on file prior to visit.         Vital Signs:    Vitals:    11/17/23 1300   BP: 106/61   Pulse: 81   SpO2: 96%   Weight: 91.2 kg (201 lb)   Height: 167.6 cm (65.98\")        Body mass index is 32.46 kg/m².  Neck Circumference: 13 inches      Physical Exam:    Constitutional:  Well developed 48 y.o. female that appears in no apparent distress.  Awake & oriented times 3.  Normal mood with normal recent and remote memory and normal judgement.  Eyes:  Conjunctivae normal.  Oropharynx: Moist mucous membranes without exudate and Mallampati 2  Neck: Trachea midline  Respiratory: Effort is not labored  Cardiovascular: Radial pulse regular  Musculoskeletal: Gait appears normal, no digital clubbing evident, no pre-tibial edema        Impression:   Encounter Diagnoses   Name Primary?    Obstructive sleep apnea, adult Yes    Wide-complex tachycardia     Type 2 diabetes mellitus without complication, with long-term current use of insulin     POTS (postural orthostatic tachycardia syndrome)      Obesity class I patient's BMI is Body mass index is 32.46 kg/m².    Patient has fatigue during the day and this might well be due to sleep apnea but POTS is also associated with the same symptoms.  I think it would be appropriate to do a home sleep apnea test.  We can at least determine whether she has sleep disordered breathing that might account for some of her fatigue.    Plan:  The patient should practice good sleep hygiene measures.      Weight loss might be beneficial in this patient who has a Body mass index is 32.46 kg/m².      Pathophysiology of MALACHI described to the patient.  Cardiovascular complications of untreated MALACHI also reviewed.      The patient was cautioned about the dangers of drowsy driving.    Lowell Goldsmith, " MD  Sleep Medicine  11/17/23  14:23 EST

## 2023-11-21 ENCOUNTER — OFFICE VISIT (OUTPATIENT)
Dept: FAMILY MEDICINE CLINIC | Facility: CLINIC | Age: 48
End: 2023-11-21
Payer: COMMERCIAL

## 2023-11-21 ENCOUNTER — LAB (OUTPATIENT)
Dept: LAB | Facility: HOSPITAL | Age: 48
End: 2023-11-21
Payer: COMMERCIAL

## 2023-11-21 VITALS
HEIGHT: 66 IN | DIASTOLIC BLOOD PRESSURE: 54 MMHG | OXYGEN SATURATION: 98 % | BODY MASS INDEX: 32.14 KG/M2 | WEIGHT: 200 LBS | SYSTOLIC BLOOD PRESSURE: 107 MMHG | HEART RATE: 72 BPM

## 2023-11-21 DIAGNOSIS — R42 DIZZINESS: ICD-10-CM

## 2023-11-21 DIAGNOSIS — F32.A DEPRESSION, UNSPECIFIED DEPRESSION TYPE: ICD-10-CM

## 2023-11-21 DIAGNOSIS — E78.5 HYPERLIPIDEMIA, UNSPECIFIED HYPERLIPIDEMIA TYPE: ICD-10-CM

## 2023-11-21 DIAGNOSIS — R05.1 ACUTE COUGH: ICD-10-CM

## 2023-11-21 DIAGNOSIS — I10 PRIMARY HYPERTENSION: ICD-10-CM

## 2023-11-21 DIAGNOSIS — J01.11 ACUTE RECURRENT FRONTAL SINUSITIS: ICD-10-CM

## 2023-11-21 DIAGNOSIS — E11.9 TYPE 2 DIABETES MELLITUS WITHOUT COMPLICATION, WITHOUT LONG-TERM CURRENT USE OF INSULIN: Primary | ICD-10-CM

## 2023-11-21 DIAGNOSIS — F41.9 ANXIETY: ICD-10-CM

## 2023-11-21 DIAGNOSIS — E11.9 TYPE 2 DIABETES MELLITUS WITHOUT COMPLICATION, WITHOUT LONG-TERM CURRENT USE OF INSULIN: ICD-10-CM

## 2023-11-21 LAB
ALBUMIN SERPL-MCNC: 4.4 G/DL (ref 3.5–5.2)
ALBUMIN UR-MCNC: 2.2 MG/DL
ALBUMIN/GLOB SERPL: 1.6 G/DL
ALP SERPL-CCNC: 48 U/L (ref 39–117)
ALT SERPL W P-5'-P-CCNC: 27 U/L (ref 1–33)
ANION GAP SERPL CALCULATED.3IONS-SCNC: 13.8 MMOL/L (ref 5–15)
AST SERPL-CCNC: 16 U/L (ref 1–32)
BILIRUB SERPL-MCNC: 0.5 MG/DL (ref 0–1.2)
BUN SERPL-MCNC: 11 MG/DL (ref 6–20)
BUN/CREAT SERPL: 15.7 (ref 7–25)
CALCIUM SPEC-SCNC: 9.5 MG/DL (ref 8.6–10.5)
CHLORIDE SERPL-SCNC: 103 MMOL/L (ref 98–107)
CHOLEST SERPL-MCNC: 106 MG/DL (ref 0–200)
CO2 SERPL-SCNC: 23.2 MMOL/L (ref 22–29)
CREAT SERPL-MCNC: 0.7 MG/DL (ref 0.57–1)
EGFRCR SERPLBLD CKD-EPI 2021: 108.2 ML/MIN/1.73
GLOBULIN UR ELPH-MCNC: 2.7 GM/DL
GLUCOSE SERPL-MCNC: 164 MG/DL (ref 65–99)
HDLC SERPL-MCNC: 33 MG/DL (ref 40–60)
LDLC SERPL CALC-MCNC: 23 MG/DL (ref 0–100)
LDLC/HDLC SERPL: 0.07 {RATIO}
POTASSIUM SERPL-SCNC: 3.8 MMOL/L (ref 3.5–5.2)
PROT SERPL-MCNC: 7.1 G/DL (ref 6–8.5)
SODIUM SERPL-SCNC: 140 MMOL/L (ref 136–145)
TRIGL SERPL-MCNC: 353 MG/DL (ref 0–150)
VLDLC SERPL-MCNC: 50 MG/DL (ref 5–40)

## 2023-11-21 PROCEDURE — 80053 COMPREHEN METABOLIC PANEL: CPT

## 2023-11-21 PROCEDURE — 82043 UR ALBUMIN QUANTITATIVE: CPT

## 2023-11-21 PROCEDURE — 36415 COLL VENOUS BLD VENIPUNCTURE: CPT

## 2023-11-21 PROCEDURE — 80061 LIPID PANEL: CPT

## 2023-11-21 RX ORDER — BENZONATATE 100 MG/1
100 CAPSULE ORAL 3 TIMES DAILY PRN
Qty: 30 CAPSULE | Refills: 0 | Status: SHIPPED | OUTPATIENT
Start: 2023-11-21

## 2023-11-21 RX ORDER — AZITHROMYCIN 250 MG/1
TABLET, FILM COATED ORAL
Qty: 6 TABLET | Refills: 0 | Status: SHIPPED | OUTPATIENT
Start: 2023-11-21

## 2023-11-21 NOTE — PROGRESS NOTES
"Chief Complaint  Anxiety, depression, HTN, HLD, DM2, possible Gage    Subjective            Katy Denise presents to [facility]  History of Present Illness  Pt here for f/u Anxiety, depression, HTN, HLD, DM2, possible Gage disease.  She requests a referral to Neurology for work up. Pt complains of intolerance to heat and cold, and lightheadedness. Pt was seen by a sleep specialist and was suggested to have tilt table test. Pt is requesting to go to Dr. Soelr.     Pt has c/o possible URI. Pt has had a productive cough and chest congestion for 2 wks. Pt states the mucus is green/yellow in color. Pt has been taking dayquil and nyquil with little relief. Pt declines any other symptoms.     She is followed by Cardiology.    She is due labs.    She is due diabetic eye exam, pt given paper to self schedule.    She is due Hep B and flu vaccine. Pt declines at this time due to not feeling well.             Past Medical History:   Diagnosis Date    Allergic rhinitis due to allergen     Anxiety disorder     Arthritis     Chest pain     NONE CURRENT, OCC SOAE    Depression     Diabetes mellitus, type 2     Essential hypertension 06/22/2021    GERD (gastroesophageal reflux disease)     Hyperlipidemia     Migraine     Wide-complex tachycardia 10/10/2023       Allergies   Allergen Reactions    Tomato Anaphylaxis    Latex Rash    Penicillins Unknown - Low Severity     \"My mother was very allergic.\"  \"My mother was very allergic.\"          Past Surgical History:   Procedure Laterality Date    CARDIAC CATHETERIZATION Left 08/30/2023    Procedure: Left Heart Cath with possible coronary angioplasty;  Surgeon: Mariam Buckner MD;  Location: [facility] CATH [facility]VASIVE LOC[facility];  Service: Cardiology;  Laterality: Left;    CARDIAC CATHETERIZATION  8/30/2023    CARPAL TUNNEL RELEASE Bilateral     COLONOSCOPY      COLONOSCOPY N/A 09/08/2022    Procedure: COLONOSCOPY with cold snare polypectomy;  Surgeon: " Keshawn Yates MD;  Location: [facility] ENDOSCOPY;  Service: General;  Laterality: N/A;  colon polyp    ENDOSCOPY      HYSTERECTOMY      LUMBAR SPINE SURGERY      Microdiscectomy of thoracic or lumbar spine    TUBAL ABDOMINAL LIGATION          Social History     Tobacco Use    Smoking status: Former     Packs/day: 0.25     Years: 15.00     Additional pack years: 0.00     Total pack years: 3.75     Types: Cigarettes     Quit date: 2009     Years since quittin.8    Smokeless tobacco: Never    Tobacco comments:     off and on for 30 years; .23ppd   Substance Use Topics    Alcohol use: Yes     Alcohol/week: 2.0 standard drinks of alcohol     Types: 2 Drinks containing 0.5 oz of alcohol per week     Comment: occ       Family History   Problem Relation Age of Onset    Heart disease Mother     Thyroid disease Mother     Restless legs syndrome Mother     Obesity Mother     Insomnia Mother     Sleep walking Mother     Obesity Brother     Heart disease Maternal Grandmother     Arrhythmia Maternal Grandfather     Heart attack Maternal Grandfather     Heart disease Maternal Grandfather     Stroke Other     Heart disease Other     Liver disease Other         cirrhosis    Diabetes Other     Malig Hyperthermia Neg Hx         Current Outpatient Medications on File Prior to Visit   Medication Sig    busPIRone (BUSPAR) 7.5 MG tablet TAKE 1 TABLET BY MOUTH TWICE DAILY AS NEEDED FOR ANXIETY    diphenhydrAMINE (BENADRYL) 25 mg capsule Take 1 capsule by mouth Every 6 (Six) Hours As Needed.    EPINEPHrine (EPIPEN) 0.3 MG/0.3ML solution auto-injector injection 0.3 mL.    Erenumab-aooe (AIMOVIG) 140 MG/ML auto-injector Inject 1 mL under the skin into the appropriate area as directed Every 30 (Thirty) Days.    escitalopram (LEXAPRO) 20 MG tablet TAKE 1 TABLET BY MOUTH DAILY    hyoscyamine (ANASPAZ,LEVSIN) 0.125 MG tablet Take 1 tablet by mouth Every 6 (Six) Hours As Needed.    losartan (COZAAR) 25 MG tablet TAKE 1 TABLET BY  "MOUTH DAILY    Melatonin 12 MG tablet Take  by mouth. 2 nightly    metFORMIN (GLUCOPHAGE) 1000 MG tablet TAKE 1 TABLET BY MOUTH TWICE DAILY WITH MEALS (Patient taking differently: Take 1 tablet by mouth 2 (Two) Times a Day With Meals. LAST DOSE 8/29/23 AM)    metoprolol succinate XL (TOPROL-XL) 50 MG 24 hr tablet Take 1 tablet by mouth Daily.    ondansetron (ZOFRAN) 4 MG tablet TAKE 1 TABLET BY MOUTH EVERY 8 HOURS AS NEEDED FOR NAUSEA OR VOMITING    Ozempic, 1 MG/DOSE, 4 MG/3ML solution pen-injector INJECT 1 MG UNDER THE SKIN INTO THE APPROPRIATE AREA ONCE PER WEEK (Patient taking differently: TAKES ON SUNDAY NIGHT)    rosuvastatin (CRESTOR) 5 MG tablet TAKE 1 TABLET BY MOUTH EVERY NIGHT    Multiple Vitamins-Minerals (Eye Health) capsule Take  by mouth. 2 daily (Patient not taking: Reported on 11/21/2023)     No current facility-administered medications on file prior to visit.       There are no preventive care reminders to display for this patient.      Objective     /54   Pulse 72   Ht 167.6 cm (66\")   Wt 90.7 kg (200 lb)   SpO2 98%   BMI 32.28 kg/m²       Physical Exam  Constitutional:       General: She is not in acute distress.     Appearance: Normal appearance. She is not ill-appearing.   HENT:      Head: Normocephalic and atraumatic.      Right Ear: Tympanic membrane, ear canal and external ear normal.      Left Ear: Tympanic membrane, ear canal and external ear normal.      Nose: Nose normal.   Cardiovascular:      Rate and Rhythm: Normal rate and regular rhythm.      Heart sounds: Normal heart sounds. No murmur heard.  Pulmonary:      Effort: Pulmonary effort is normal. No respiratory distress.      Breath sounds: Normal breath sounds.   Chest:      Chest wall: No tenderness.   Abdominal:      General: There is no distension.      Palpations: There is no mass.      Tenderness: There is no abdominal tenderness. There is no guarding.   Musculoskeletal:         General: No swelling or tenderness. " Normal range of motion.      Cervical back: Normal range of motion and neck supple.   Skin:     General: Skin is warm and dry.      Findings: No rash.   Neurological:      General: No focal deficit present.      Mental Status: She is alert and oriented to person, place, and time. Mental status is at baseline.      Gait: Gait normal.   Psychiatric:         Attention and Perception: Attention normal.         Mood and Affect: Mood and affect normal.         Speech: Speech normal.         Behavior: Behavior normal. Behavior is cooperative.         Thought Content: Thought content normal. Thought content does not include suicidal ideation.         Judgment: Judgment normal.           Result Review :                           Assessment and Plan        Diagnoses and all orders for this visit:    1. Type 2 diabetes mellitus without complication, without long-term current use of insulin (Primary)  Comments:  stable on glucophage 1000mg, continue  Orders:  -     MicroAlbumin, Urine, Random - Urine, Clean Catch; Future    2. Anxiety  Comments:  stableon Lexapro 20mg and buspar 7.5mg prn, continue    3. Depression, unspecified depression type  Comments:  stable on Lexapro 20mg, continue    4. Primary hypertension  Comments:  stableon cozaar 25mg and toprol 50mg, continue, continue f/u with Cardiology for mgmt    5. Hyperlipidemia, unspecified hyperlipidemia type  Comments:  stable on crestor 5mg, continue  Orders:  -     Comprehensive metabolic panel; Future  -     Lipid panel; Future    6. Dizziness  -     Ambulatory Referral to Neurology    7. Acute recurrent frontal sinusitis  -     azithromycin (Zithromax Z-Regino) 250 MG tablet; Take 2 tablets by mouth on day 1, then 1 tablet daily on days 2-5  Dispense: 6 tablet; Refill: 0  -     benzonatate (Tessalon Perles) 100 MG capsule; Take 1 capsule by mouth 3 (Three) Times a Day As Needed for Cough.  Dispense: 30 capsule; Refill: 0    8. Acute cough  -     benzonatate (Tessalon  Perles) 100 MG capsule; Take 1 capsule by mouth 3 (Three) Times a Day As Needed for Cough.  Dispense: 30 capsule; Refill: 0              Follow Up     Return in about 6 months (around 5/21/2024).    Patient was given instructions and counseling regarding her condition or for health maintenance advice. Please see specific information pulled into the AVS if appropriate.     Katy Denise  reports that she quit smoking about 14 years ago. Her smoking use included cigarettes. She has a 3.75 pack-year smoking history. She has never used smokeless tobacco..     DELIO Gutierrez

## 2023-12-11 ENCOUNTER — TELEPHONE (OUTPATIENT)
Dept: FAMILY MEDICINE CLINIC | Facility: CLINIC | Age: 48
End: 2023-12-11
Payer: COMMERCIAL

## 2023-12-11 NOTE — TELEPHONE ENCOUNTER
Caller: Katy Denise    Relationship: Self    Best call back number:    [phone]       What form or medical record are you requesting: PAPERWORK STATING PATIENTS MOTHER WAS ADMITTED TO NURSING HOME AND SHE CAN RETURN TO WORK     Who is requesting this form or medical record from you: WORK    How would you like to receive the form or medical records (pick-up, mail, fax): PICK-UP

## 2023-12-11 NOTE — TELEPHONE ENCOUNTER
Pt was on medical leave from work due care of mother. Mother is now in nursing home. Pt's employer is requiring a note stating pt can come back to work .     Per Nataliya, okay to give letter.

## 2023-12-14 DIAGNOSIS — E78.2 MIXED HYPERLIPIDEMIA: ICD-10-CM

## 2023-12-14 DIAGNOSIS — F41.9 ANXIETY: ICD-10-CM

## 2023-12-14 RX ORDER — BUSPIRONE HYDROCHLORIDE 7.5 MG/1
TABLET ORAL
Qty: 60 TABLET | Refills: 2 | Status: SHIPPED | OUTPATIENT
Start: 2023-12-14

## 2023-12-14 RX ORDER — ROSUVASTATIN CALCIUM 5 MG/1
5 TABLET, COATED ORAL NIGHTLY
Qty: 90 TABLET | Refills: 1 | Status: SHIPPED | OUTPATIENT
Start: 2023-12-14

## 2023-12-15 NOTE — TELEPHONE ENCOUNTER
HUB TO RELAY & SCHEDULE     2nd attempt - LMTCB to schedule 6 mo f/u from last visit - schedule for May 2024.

## 2024-01-05 ENCOUNTER — OFFICE VISIT (OUTPATIENT)
Dept: SLEEP MEDICINE | Facility: HOSPITAL | Age: 49
End: 2024-01-05
Payer: COMMERCIAL

## 2024-01-05 VITALS
WEIGHT: 209.3 LBS | OXYGEN SATURATION: 95 % | HEIGHT: 66 IN | BODY MASS INDEX: 33.64 KG/M2 | HEART RATE: 86 BPM | SYSTOLIC BLOOD PRESSURE: 121 MMHG | DIASTOLIC BLOOD PRESSURE: 66 MMHG

## 2024-01-05 DIAGNOSIS — G47.33 OBSTRUCTIVE SLEEP APNEA, ADULT: Primary | ICD-10-CM

## 2024-01-05 PROCEDURE — G0463 HOSPITAL OUTPT CLINIC VISIT: HCPCS

## 2024-01-05 NOTE — PROGRESS NOTES
"Sleep Study Follow-Up [facility] Note     Chief Complaint: Concern for sleep apnea      Primary Care Physician: Nataliya Obrien APRN    Interval History:   The patient is a 48 y.o. female  who I last saw on 11/17/2023 and that note was reviewed.  She returns to follow-up on her home sleep apnea test and she told me that she put it on 11 PM but the study only recorded from 5 AM to 7 AM.  She is at she had on much longer than that and there is concerns that this study is not valid.  Her overall AHI/TAVON was 1.9 but there were only a little over 2 hours recorded and she said should have recorded much more than that.  She is interested in repeat study.      Data Reviewed: Home sleep apnea test dated 12/6/2023      Allergies:  Tomato, Latex, and Penicillins     Medication Review:  Reviewed.      Vital Signs:    Vitals:    01/05/24 1300   BP: 121/66   Pulse: 86   SpO2: 95%   Weight: 94.9 kg (209 lb 4.8 oz)   Height: 167.6 cm (65.98\")     Body mass index is 33.8 kg/m².    Physical Exam:    Constitutional:  Well developed 48 y.o. female that appears in no apparent distress.  Awake & oriented times 3.  Normal mood with normal recent and remote memory and normal judgement.  Eyes:  Conjunctivae normal.      Self-administered [place] Sleepiness Scale test results: 13  0-5 Lower normal daytime sleepiness  6-10 Higher normal daytime sleepiness  11-12 Mild, 13-15 Moderate, & 16-24 Severe excessive daytime sleepiness     This patient had a sleep apnea test sleep study.    This test does not appear to have been valid since did not record most of her sleep.      Impression:   Encounter Diagnoses   Name Primary?    Obstructive sleep apnea, adult Yes       Patient had what is likely a technically invalid study even though she did have a recorded minimum 2 hours of sleep.    Plan:    Repeat home sleep apnea test.  Good sleep hygiene measures should be maintained.     The patient was cautioned about drowsy driving.      I " answered all of the patient's questions to the best of my ability.      Lowell Goldsmith MD  Sleep Medicine  01/05/24  14:54 EST

## 2024-01-13 DIAGNOSIS — F41.9 ANXIETY DISORDER, UNSPECIFIED TYPE: ICD-10-CM

## 2024-01-13 DIAGNOSIS — F33.0 MILD EPISODE OF RECURRENT MAJOR DEPRESSIVE DISORDER: ICD-10-CM

## 2024-01-13 DIAGNOSIS — E11.69 TYPE 2 DIABETES MELLITUS WITH OTHER SPECIFIED COMPLICATION, WITHOUT LONG-TERM CURRENT USE OF INSULIN: ICD-10-CM

## 2024-01-13 DIAGNOSIS — I10 ESSENTIAL HYPERTENSION: ICD-10-CM

## 2024-01-15 RX ORDER — ESCITALOPRAM OXALATE 20 MG/1
20 TABLET ORAL DAILY
Qty: 90 TABLET | Refills: 1 | Status: SHIPPED | OUTPATIENT
Start: 2024-01-15

## 2024-01-15 RX ORDER — LOSARTAN POTASSIUM 25 MG/1
25 TABLET ORAL DAILY
Qty: 90 TABLET | Refills: 1 | Status: SHIPPED | OUTPATIENT
Start: 2024-01-15

## 2024-01-15 NOTE — TELEPHONE ENCOUNTER
SSRI Protocol [id number]/13/2024 06:28 AM   Protocol Details PHQ2 or PHQ9 on record in past 12 months    No active pregnancy on record    No positive pregnancy test in past 12 months    Blood pressure on record in past 15 months    Recent or future visit with authorizing provider        F/U 5/8/24

## 2024-01-30 ENCOUNTER — HOSPITAL ENCOUNTER (OUTPATIENT)
Dept: SLEEP MEDICINE | Facility: HOSPITAL | Age: 49
Discharge: HOME | End: 2024-01-30
Admitting: PSYCHIATRY & NEUROLOGY
Payer: COMMERCIAL

## 2024-01-30 DIAGNOSIS — G47.33 OBSTRUCTIVE SLEEP APNEA, ADULT: ICD-10-CM

## 2024-01-30 PROCEDURE — 95806 SLEEP STUDY UNATT&RESP EFFT: CPT

## 2024-02-28 ENCOUNTER — TELEPHONE (OUTPATIENT)
Dept: SLEEP MEDICINE | Facility: HOSPITAL | Age: 49
End: 2024-02-28
Payer: COMMERCIAL

## 2024-03-22 DIAGNOSIS — G43.701 CHRONIC MIGRAINE WITHOUT AURA WITH STATUS MIGRAINOSUS, NOT INTRACTABLE: ICD-10-CM

## 2024-03-22 DIAGNOSIS — E11.69 TYPE 2 DIABETES MELLITUS WITH OTHER SPECIFIED COMPLICATION, WITHOUT LONG-TERM CURRENT USE OF INSULIN: ICD-10-CM

## 2024-03-24 RX ORDER — ERENUMAB-AOOE 140 MG/ML
INJECTION, SOLUTION SUBCUTANEOUS
Qty: 3 ML | Refills: 1 | Status: SHIPPED | OUTPATIENT
Start: 2024-03-24

## 2024-03-24 RX ORDER — SEMAGLUTIDE 1.34 MG/ML
INJECTION, SOLUTION SUBCUTANEOUS
Qty: 12 ML | Refills: 1 | Status: SHIPPED | OUTPATIENT
Start: 2024-03-24

## 2024-04-02 RX ORDER — GALCANEZUMAB 120 MG/ML
120 INJECTION, SOLUTION SUBCUTANEOUS
Qty: 1.12 ML | Refills: 3 | Status: SHIPPED | OUTPATIENT
Start: 2024-04-02

## 2024-04-03 ENCOUNTER — TELEPHONE (OUTPATIENT)
Dept: FAMILY MEDICINE CLINIC | Facility: CLINIC | Age: 49
End: 2024-04-03
Payer: COMMERCIAL

## 2024-04-03 NOTE — TELEPHONE ENCOUNTER
----- Message from DELIO Gutierrez sent at 4/2/2024  7:45 PM EDT -----  Emgality sent to pharmacy  ----- Message -----  From: Rosalba Mercado  Sent: 4/2/2024   3:50 PM EDT  To: DELIO Gutierrez    Aimovig denied. Alternatives: Emgality, qulipta    Please advise

## 2024-04-16 ENCOUNTER — TELEPHONE (OUTPATIENT)
Dept: FAMILY MEDICINE CLINIC | Facility: CLINIC | Age: 49
End: 2024-04-16
Payer: COMMERCIAL

## 2024-05-07 ENCOUNTER — LAB (OUTPATIENT)
Dept: LAB | Facility: HOSPITAL | Age: 49
End: 2024-05-07
Payer: COMMERCIAL

## 2024-05-07 DIAGNOSIS — E11.65 TYPE 2 DIABETES MELLITUS WITH HYPERGLYCEMIA, WITHOUT LONG-TERM CURRENT USE OF INSULIN: ICD-10-CM

## 2024-05-07 LAB — HBA1C MFR BLD: 7.8 % (ref 4.8–5.6)

## 2024-05-07 PROCEDURE — 36415 COLL VENOUS BLD VENIPUNCTURE: CPT

## 2024-05-07 PROCEDURE — 83036 HEMOGLOBIN GLYCOSYLATED A1C: CPT

## 2024-05-08 ENCOUNTER — OFFICE VISIT (OUTPATIENT)
Dept: FAMILY MEDICINE CLINIC | Facility: CLINIC | Age: 49
End: 2024-05-08
Payer: COMMERCIAL

## 2024-05-08 VITALS
WEIGHT: 208 LBS | HEIGHT: 66 IN | DIASTOLIC BLOOD PRESSURE: 56 MMHG | HEART RATE: 68 BPM | SYSTOLIC BLOOD PRESSURE: 123 MMHG | OXYGEN SATURATION: 98 % | BODY MASS INDEX: 33.43 KG/M2

## 2024-05-08 DIAGNOSIS — F32.A DEPRESSION, UNSPECIFIED DEPRESSION TYPE: ICD-10-CM

## 2024-05-08 DIAGNOSIS — Z00.00 ANNUAL PHYSICAL EXAM: Primary | ICD-10-CM

## 2024-05-08 DIAGNOSIS — Z12.31 ENCOUNTER FOR SCREENING MAMMOGRAM FOR MALIGNANT NEOPLASM OF BREAST: ICD-10-CM

## 2024-05-08 DIAGNOSIS — E78.5 HYPERLIPIDEMIA, UNSPECIFIED HYPERLIPIDEMIA TYPE: ICD-10-CM

## 2024-05-08 DIAGNOSIS — I10 PRIMARY HYPERTENSION: ICD-10-CM

## 2024-05-08 DIAGNOSIS — Z13.29 SCREENING FOR THYROID DISORDER: ICD-10-CM

## 2024-05-08 DIAGNOSIS — F41.9 ANXIETY: ICD-10-CM

## 2024-05-08 DIAGNOSIS — E11.65 TYPE 2 DIABETES MELLITUS WITH HYPERGLYCEMIA, WITHOUT LONG-TERM CURRENT USE OF INSULIN: ICD-10-CM

## 2024-05-08 DIAGNOSIS — F51.01 PRIMARY INSOMNIA: ICD-10-CM

## 2024-05-08 PROCEDURE — 99213 OFFICE O/P EST LOW 20 MIN: CPT | Performed by: NURSE PRACTITIONER

## 2024-05-08 PROCEDURE — 99396 PREV VISIT EST AGE 40-64: CPT | Performed by: NURSE PRACTITIONER

## 2024-05-08 RX ORDER — TRAZODONE HYDROCHLORIDE 50 MG/1
50 TABLET ORAL NIGHTLY
Qty: 90 TABLET | Refills: 1 | Status: SHIPPED | OUTPATIENT
Start: 2024-05-08

## 2024-05-21 ENCOUNTER — OFFICE VISIT (OUTPATIENT)
Dept: FAMILY MEDICINE CLINIC | Facility: CLINIC | Age: 49
End: 2024-05-21
Payer: COMMERCIAL

## 2024-05-21 VITALS
HEIGHT: 66 IN | BODY MASS INDEX: 33.59 KG/M2 | HEART RATE: 80 BPM | DIASTOLIC BLOOD PRESSURE: 64 MMHG | SYSTOLIC BLOOD PRESSURE: 118 MMHG | WEIGHT: 209 LBS | OXYGEN SATURATION: 97 %

## 2024-05-21 DIAGNOSIS — M70.62 TROCHANTERIC BURSITIS OF LEFT HIP: ICD-10-CM

## 2024-05-21 DIAGNOSIS — M25.552 LEFT HIP PAIN: Primary | ICD-10-CM

## 2024-05-21 PROCEDURE — 99213 OFFICE O/P EST LOW 20 MIN: CPT | Performed by: NURSE PRACTITIONER

## 2024-05-21 PROCEDURE — 96372 THER/PROPH/DIAG INJ SC/IM: CPT | Performed by: NURSE PRACTITIONER

## 2024-05-21 RX ORDER — TRIAMCINOLONE ACETONIDE 40 MG/ML
60 INJECTION, SUSPENSION INTRA-ARTICULAR; INTRAMUSCULAR ONCE
Status: COMPLETED | OUTPATIENT
Start: 2024-05-21 | End: 2024-05-21

## 2024-05-21 RX ORDER — METHYLPREDNISOLONE 4 MG/1
TABLET ORAL
Qty: 21 EACH | Refills: 0 | Status: SHIPPED | OUTPATIENT
Start: 2024-05-21

## 2024-05-21 RX ADMIN — TRIAMCINOLONE ACETONIDE 60 MG: 40 INJECTION, SUSPENSION INTRA-ARTICULAR; INTRAMUSCULAR at 11:04

## 2024-05-21 NOTE — PROGRESS NOTES
"Chief Complaint  Hip Pain on the left    Adrianne Denise presents to [facility]  History of Present Illness  Pt has c/o (L) hip pain.  Pt reports the pain started Friday night. NKI. Pt has been using tens unit, icy hot, tylenol, and stretches with little relief. Pt reports there is some numbness down (L) leg.         Past Medical History:   Diagnosis Date    Allergic rhinitis due to allergen     Anxiety disorder     Arthritis     Chest pain     NONE CURRENT, OCC SOAE    Depression     Diabetes mellitus, type 2     Essential hypertension 06/22/2021    GERD (gastroesophageal reflux disease)     Hyperlipidemia     Migraine     Wide-complex tachycardia 10/10/2023       Allergies   Allergen Reactions    Tomato Anaphylaxis    Latex Rash    Penicillins Unknown - Low Severity     \"My mother was very allergic.\"  \"My mother was very allergic.\"          Past Surgical History:   Procedure Laterality Date    CARDIAC CATHETERIZATION Left 08/30/2023    Procedure: Left Heart Cath with possible coronary angioplasty;  Surgeon: Mariam Buckner MD;  Location: [facility] CATH INVASIVE LOCATION;  Service: Cardiology;  Laterality: Left;    CARDIAC CATHETERIZATION  8/30/2023    CARPAL TUNNEL RELEASE Bilateral     COLONOSCOPY      COLONOSCOPY N/A 09/08/2022    Procedure: COLONOSCOPY with cold snare polypectomy;  Surgeon: Keshawn Yates MD;  Location: [facility] ENDOSCOPY;  Service: General;  Laterality: N/A;  colon polyp    ENDOSCOPY      HYSTERECTOMY      LUMBAR SPINE SURGERY      Microdiscectomy of thoracic or lumbar spine    TUBAL ABDOMINAL LIGATION          Social History     Tobacco Use    Smoking status: Former     Current packs/day: 0.00     Average packs/day: 0.3 packs/day for 15.0 years (3.8 ttl pk-yrs)     Types: Cigarettes     Start date: 1/1/1994     Quit date: 1/1/2009     Years since quitting: 15.3    Smokeless tobacco: Never    Tobacco comments:     off and on for 30 " years; .23ppd   Substance Use Topics    Alcohol use: Yes     Alcohol/week: 2.0 standard drinks of alcohol     Types: 2 Drinks containing 0.5 oz of alcohol per week     Comment: occ       Family History   Problem Relation Age of Onset    Heart disease Mother     Thyroid disease Mother     Restless legs syndrome Mother     Obesity Mother     Insomnia Mother     Sleep walking Mother     Obesity Brother     Heart disease Maternal Grandmother     Arrhythmia Maternal Grandfather     Heart attack Maternal Grandfather     Heart disease Maternal Grandfather     Stroke Other     Heart disease Other     Liver disease Other         cirrhosis    Diabetes Other     Malig Hyperthermia Neg Hx         Current Outpatient Medications on File Prior to Visit   Medication Sig    busPIRone (BUSPAR) 7.5 MG tablet TAKE 1 TABLET BY MOUTH TWICE DAILY AS NEEDED FOR ANXIETY    EPINEPHrine (EPIPEN) 0.3 MG/0.3ML solution auto-injector injection 0.3 mL.    escitalopram (LEXAPRO) 20 MG tablet TAKE 1 TABLET BY MOUTH DAILY    galcanezumab-gnlm (Emgality) 120 MG/ML auto-injector pen Inject 1 mL under the skin into the appropriate area as directed Every 30 (Thirty) Days.    hyoscyamine (ANASPAZ,LEVSIN) 0.125 MG tablet Take 1 tablet by mouth Every 6 (Six) Hours As Needed.    losartan (COZAAR) 25 MG tablet TAKE 1 TABLET BY MOUTH DAILY    metFORMIN (GLUCOPHAGE) 1000 MG tablet TAKE 1 TABLET BY MOUTH TWICE DAILY WITH MEALS    metoprolol succinate XL (TOPROL-XL) 50 MG 24 hr tablet Take 1 tablet by mouth Daily.    ondansetron (ZOFRAN) 4 MG tablet TAKE 1 TABLET BY MOUTH EVERY 8 HOURS AS NEEDED FOR NAUSEA OR VOMITING    rosuvastatin (CRESTOR) 5 MG tablet TAKE 1 TABLET BY MOUTH EVERY NIGHT    Tirzepatide (Mounjaro) 2.5 MG/0.5ML solution pen-injector pen Inject 0.5 mL under the skin into the appropriate area as directed 1 (One) Time Per Week.    traZODone (DESYREL) 50 MG tablet Take 1 tablet by mouth Every Night.    [DISCONTINUED] Melatonin 12 MG tablet Take  by  "mouth. 2 nightly (Patient not taking: Reported on 5/21/2024)     No current facility-administered medications on file prior to visit.       Health Maintenance Due   Topic Date Due    DIABETIC EYE EXAM  06/03/2023    COVID-19 Vaccine (7 - 2023-24 season) 09/01/2023    BMI FOLLOWUP  09/22/2023       Objective     /64   Pulse 80   Ht 167.6 cm (66\")   Wt 94.8 kg (209 lb)   SpO2 97%   BMI 33.73 kg/m²       Physical Exam  Constitutional:       General: She is not in acute distress.     Appearance: Normal appearance. She is not ill-appearing.   HENT:      Head: Normocephalic and atraumatic.   Cardiovascular:      Rate and Rhythm: Normal rate and regular rhythm.      Heart sounds: Normal heart sounds. No murmur heard.  Pulmonary:      Effort: Pulmonary effort is normal. No respiratory distress.      Breath sounds: Normal breath sounds.   Chest:      Chest wall: No tenderness.   Abdominal:      Palpations: There is mass.   Musculoskeletal:         General: No swelling or tenderness. Normal range of motion.      Cervical back: Normal range of motion and neck supple.      Left hip: Tenderness present. No deformity or crepitus. Normal range of motion. Normal strength.      Comments: Pain noted upon palpation in the left over the greater trochanteric bursa   Skin:     General: Skin is warm and dry.      Findings: No rash.   Neurological:      General: No focal deficit present.      Mental Status: She is alert and oriented to person, place, and time. Mental status is at baseline.      Gait: Gait normal.   Psychiatric:         Mood and Affect: Mood normal.         Behavior: Behavior normal.         Thought Content: Thought content normal.         Judgment: Judgment normal.         BMI is >= 30 and <35. (Class 1 Obesity). The following options were offered after discussion;: exercise counseling/recommendations and nutrition counseling/recommendations        Result Review :                           Assessment and Plan  "       Diagnoses and all orders for this visit:    1. Left hip pain (Primary)  -     triamcinolone acetonide (KENALOG-40) injection 60 mg  -     methylPREDNISolone (MEDROL) 4 MG dose pack; Take as directed on package instructions.  Dispense: 21 each; Refill: 0    2. Trochanteric bursitis of left hip  -     triamcinolone acetonide (KENALOG-40) injection 60 mg  -     methylPREDNISolone (MEDROL) 4 MG dose pack; Take as directed on package instructions.  Dispense: 21 each; Refill: 0    Advised to start medrol pack tomorrow, f/u if pain increases or persists will consider imaging and/or PT          Follow Up     Return if symptoms worsen or fail to improve.    Patient was given instructions and counseling regarding her condition or for health maintenance advice. Please see specific information pulled into the AVS if appropriate.     Katy Denise  reports that she quit smoking about 15 years ago. Her smoking use included cigarettes. She started smoking about 30 years ago. She has a 3.8 pack-year smoking history. She has never used smokeless tobacco.

## 2024-05-24 ENCOUNTER — LAB (OUTPATIENT)
Dept: LAB | Facility: HOSPITAL | Age: 49
End: 2024-05-24
Payer: COMMERCIAL

## 2024-05-24 DIAGNOSIS — Z00.00 ANNUAL PHYSICAL EXAM: ICD-10-CM

## 2024-05-24 DIAGNOSIS — I10 PRIMARY HYPERTENSION: ICD-10-CM

## 2024-05-24 DIAGNOSIS — E78.5 HYPERLIPIDEMIA, UNSPECIFIED HYPERLIPIDEMIA TYPE: ICD-10-CM

## 2024-05-24 DIAGNOSIS — Z13.29 SCREENING FOR THYROID DISORDER: ICD-10-CM

## 2024-05-24 LAB
ALBUMIN SERPL-MCNC: 4.2 G/DL (ref 3.5–5.2)
ALBUMIN/GLOB SERPL: 1.5 G/DL
ALP SERPL-CCNC: 53 U/L (ref 39–117)
ALT SERPL W P-5'-P-CCNC: 37 U/L (ref 1–33)
ANION GAP SERPL CALCULATED.3IONS-SCNC: 12.2 MMOL/L (ref 5–15)
AST SERPL-CCNC: 30 U/L (ref 1–32)
BASOPHILS # BLD AUTO: 0.04 10*3/MM3 (ref 0–0.2)
BASOPHILS NFR BLD AUTO: 0.8 % (ref 0–1.5)
BILIRUB SERPL-MCNC: 0.5 MG/DL (ref 0–1.2)
BUN SERPL-MCNC: 10 MG/DL (ref 6–20)
BUN/CREAT SERPL: 16.9 (ref 7–25)
CALCIUM SPEC-SCNC: 9.4 MG/DL (ref 8.6–10.5)
CHLORIDE SERPL-SCNC: 104 MMOL/L (ref 98–107)
CHOLEST SERPL-MCNC: 189 MG/DL (ref 0–200)
CO2 SERPL-SCNC: 22.8 MMOL/L (ref 22–29)
CREAT SERPL-MCNC: 0.59 MG/DL (ref 0.57–1)
DEPRECATED RDW RBC AUTO: 45.5 FL (ref 37–54)
EGFRCR SERPLBLD CKD-EPI 2021: 112 ML/MIN/1.73
EOSINOPHIL # BLD AUTO: 0.12 10*3/MM3 (ref 0–0.4)
EOSINOPHIL NFR BLD AUTO: 2.3 % (ref 0.3–6.2)
ERYTHROCYTE [DISTWIDTH] IN BLOOD BY AUTOMATED COUNT: 13.2 % (ref 12.3–15.4)
GLOBULIN UR ELPH-MCNC: 2.8 GM/DL
GLUCOSE SERPL-MCNC: 205 MG/DL (ref 65–99)
HCT VFR BLD AUTO: 40.6 % (ref 34–46.6)
HDLC SERPL-MCNC: 45 MG/DL (ref 40–60)
HGB BLD-MCNC: 13.1 G/DL (ref 12–15.9)
IMM GRANULOCYTES # BLD AUTO: 0.01 10*3/MM3 (ref 0–0.05)
IMM GRANULOCYTES NFR BLD AUTO: 0.2 % (ref 0–0.5)
LDLC SERPL CALC-MCNC: 71 MG/DL (ref 0–100)
LDLC/HDLC SERPL: 1.1 {RATIO}
LYMPHOCYTES # BLD AUTO: 2.3 10*3/MM3 (ref 0.7–3.1)
LYMPHOCYTES NFR BLD AUTO: 43.9 % (ref 19.6–45.3)
MCH RBC QN AUTO: 30.4 PG (ref 26.6–33)
MCHC RBC AUTO-ENTMCNC: 32.3 G/DL (ref 31.5–35.7)
MCV RBC AUTO: 94.2 FL (ref 79–97)
MONOCYTES # BLD AUTO: 0.51 10*3/MM3 (ref 0.1–0.9)
MONOCYTES NFR BLD AUTO: 9.7 % (ref 5–12)
NEUTROPHILS NFR BLD AUTO: 2.26 10*3/MM3 (ref 1.7–7)
NEUTROPHILS NFR BLD AUTO: 43.1 % (ref 42.7–76)
NRBC BLD AUTO-RTO: 0 /100 WBC (ref 0–0.2)
PLATELET # BLD AUTO: 238 10*3/MM3 (ref 140–450)
PMV BLD AUTO: 10.9 FL (ref 6–12)
POTASSIUM SERPL-SCNC: 4 MMOL/L (ref 3.5–5.2)
PROT SERPL-MCNC: 7 G/DL (ref 6–8.5)
RBC # BLD AUTO: 4.31 10*6/MM3 (ref 3.77–5.28)
SODIUM SERPL-SCNC: 139 MMOL/L (ref 136–145)
TRIGL SERPL-MCNC: 472 MG/DL (ref 0–150)
TSH SERPL DL<=0.05 MIU/L-ACNC: 1.48 UIU/ML (ref 0.27–4.2)
VLDLC SERPL-MCNC: 73 MG/DL (ref 5–40)
WBC NRBC COR # BLD AUTO: 5.24 10*3/MM3 (ref 3.4–10.8)

## 2024-05-24 PROCEDURE — 80061 LIPID PANEL: CPT

## 2024-05-24 PROCEDURE — 36415 COLL VENOUS BLD VENIPUNCTURE: CPT

## 2024-05-24 PROCEDURE — 80050 GENERAL HEALTH PANEL: CPT

## 2024-06-11 DIAGNOSIS — E78.2 MIXED HYPERLIPIDEMIA: ICD-10-CM

## 2024-06-11 RX ORDER — ROSUVASTATIN CALCIUM 5 MG/1
5 TABLET, COATED ORAL NIGHTLY
Qty: 90 TABLET | Refills: 1 | Status: SHIPPED | OUTPATIENT
Start: 2024-06-11

## 2024-06-20 DIAGNOSIS — E11.65 TYPE 2 DIABETES MELLITUS WITH HYPERGLYCEMIA, WITHOUT LONG-TERM CURRENT USE OF INSULIN: ICD-10-CM

## 2024-06-20 RX ORDER — TIRZEPATIDE 2.5 MG/.5ML
INJECTION, SOLUTION SUBCUTANEOUS
Qty: 2 ML | Refills: 0 | Status: SHIPPED | OUTPATIENT
Start: 2024-06-20

## 2024-07-02 ENCOUNTER — OFFICE VISIT (OUTPATIENT)
Dept: DIABETES SERVICES | Facility: HOSPITAL | Age: 49
End: 2024-07-02
Payer: COMMERCIAL

## 2024-07-02 VITALS
WEIGHT: 205.4 LBS | SYSTOLIC BLOOD PRESSURE: 123 MMHG | BODY MASS INDEX: 33.01 KG/M2 | HEART RATE: 97 BPM | DIASTOLIC BLOOD PRESSURE: 68 MMHG | HEIGHT: 66 IN | OXYGEN SATURATION: 97 %

## 2024-07-02 DIAGNOSIS — E11.40 TYPE 2 DIABETES MELLITUS WITH DIABETIC NEUROPATHY, WITHOUT LONG-TERM CURRENT USE OF INSULIN: ICD-10-CM

## 2024-07-02 DIAGNOSIS — E11.65 UNCONTROLLED TYPE 2 DIABETES MELLITUS WITH HYPERGLYCEMIA: ICD-10-CM

## 2024-07-02 DIAGNOSIS — E11.65 TYPE 2 DIABETES MELLITUS WITH HYPERGLYCEMIA, WITHOUT LONG-TERM CURRENT USE OF INSULIN: Primary | ICD-10-CM

## 2024-07-02 DIAGNOSIS — E66.9 OBESITY (BMI 30-39.9): ICD-10-CM

## 2024-07-02 PROBLEM — I10 ESSENTIAL HYPERTENSION: Status: RESOLVED | Noted: 2021-06-22 | Resolved: 2024-07-02

## 2024-07-02 LAB
EXPIRATION DATE: ABNORMAL
GLUCOSE BLDC GLUCOMTR-MCNC: 129 MG/DL (ref 70–99)
HBA1C MFR BLD: 7.9 % (ref 4.5–5.7)
Lab: ABNORMAL

## 2024-07-02 PROCEDURE — G0463 HOSPITAL OUTPT CLINIC VISIT: HCPCS | Performed by: NURSE PRACTITIONER

## 2024-07-02 PROCEDURE — 99214 OFFICE O/P EST MOD 30 MIN: CPT | Performed by: NURSE PRACTITIONER

## 2024-07-02 PROCEDURE — 82948 REAGENT STRIP/BLOOD GLUCOSE: CPT | Performed by: NURSE PRACTITIONER

## 2024-07-02 PROCEDURE — 83036 HEMOGLOBIN GLYCOSYLATED A1C: CPT | Performed by: NURSE PRACTITIONER

## 2024-07-02 RX ORDER — LANCETS 30 GAUGE
EACH MISCELLANEOUS
Qty: 100 EACH | Refills: 5 | Status: SHIPPED | OUTPATIENT
Start: 2024-07-02

## 2024-07-02 RX ORDER — METFORMIN HYDROCHLORIDE 500 MG/1
1000 TABLET, EXTENDED RELEASE ORAL
Qty: 180 TABLET | Refills: 1 | Status: SHIPPED | OUTPATIENT
Start: 2024-07-02 | End: 2024-12-29

## 2024-07-02 NOTE — PATIENT INSTRUCTIONS
Increase your Mounjaro from 2.5 mg once weekly to 5 mg once weekly.  You can finish your current prescription for the 2.5 mg dose by taking 2 injections to equal 5 mg and to  your new prescription.    Your metformin was switched from regular metformin to extended release metformin.  This prescription was sent to [facility] for $4 prescription    A glucometer and supplies was sent to your pharmacy.  Make sure to keep a log to bring to your visit for review.

## 2024-07-02 NOTE — PROGRESS NOTES
Chief Complaint  Diabetes (New patient, Establish care. A1c eval, Does not check BS at home)    Referred By: DELIO Gutierrez    Subjective          Katy Denise presents to [facility] for diabetes medication management    History of Present Illness    Visit type:  to establish care  Diabetes type:  Type 2  Age at time of dx/Year of dx/Number of years: Reports she was diagnosed with type 2 diabetes around 2011- 2012  Family History of Diabetes: Brother  Current diabetes status/concerns/issues: Reports she was referred to our clinic by her primary care provider for management of her diabetes. States she exercises walking 1 mile per day. She can cut out carbs and sugars and her weight keeps going up. States she has not been checking her glucose levels.  States she would like to go off metformin due to chronic diarrhea.  States she was switched to extended release metformin which worked well but her insurance would not cover it.  Other current health concerns: states she was on steroids recently for hip bursitis.   Current Diabetes symptoms:    Polyuria: Yes waxes and wanes   Polydipsia: Yes waxes and wanes   Polyphagia: Yes waxes and wanes   Blurred vision: Yes occ   Excessive fatigue: Yes    Known Diabetes complications:  Neuropathy: Numbness and Tingling; Location: Feet  Renal: None  Eyes: No Retinopathy reported on current eye exam; Location: Bilateral; Last Eye Exam:  5/22/24 ; Location: [facility]  Amputation/Wounds:  slow to heal  GI: Diarrhea  Cardiovascular: Hyperlipidemia and Other: tachycardia-POTS  ED: None  Other: None  Hospitalizations/ED/911 secondary to DM?  No  Hypoglycemia:  None reported at this time  Hypoglycemia Symptoms:  No hypoglycemia at this time  Current Diabetes treatment:  Mounjaro 2.5mg once wk, metformin 1000mg bid  Prior diabetes treatments:  Ozempic, Trulicity, Levemir  Using ACEI or ARB: Yes, losartan 25mg qd, Managed by other provider  Using  Statin: Yes, crestor 5mg qd, Managed by other provider  Blood glucose device:  Not currently monitoring BG  Blood glucose monitoring frequency:  Not currently monitoring BG  Blood glucose range/average:  Not currently monitoring BG  Glucose Source: N/A  Dietary behavior:  Limits high carb/sweet foods, Avoids sugary drinks, Number of meals each day - 3; Number of snacks each day - 1  Activity/Exercise:   walks 1 mile daily, swim, eliptical  Last Foot Exam: None  Diabetes Education Hx: Comprehensive Diabetes Education and Diabetes Nutrition Counseling  Social Determinants of Health: None    Past Medical History:   Diagnosis Date    Allergic rhinitis due to allergen     Anxiety disorder     Arthritis     Chest pain     NONE CURRENT, OCC SOAE    Depression     Diabetes mellitus, type 2     Essential hypertension 06/22/2021    GERD (gastroesophageal reflux disease)     Hyperlipidemia     Migraine     Wide-complex tachycardia 10/10/2023     Past Surgical History:   Procedure Laterality Date    CARDIAC CATHETERIZATION Left 08/30/2023    Procedure: Left Heart Cath with possible coronary angioplasty;  Surgeon: Mariam Buckner MD;  Location: [facility] CATH INVASIVE LOCATION;  Service: Cardiology;  Laterality: Left;    CARDIAC CATHETERIZATION  8/30/2023    CARPAL TUNNEL RELEASE Bilateral     COLONOSCOPY      COLONOSCOPY N/A 09/08/2022    Procedure: COLONOSCOPY with cold snare polypectomy;  Surgeon: Keshawn Yates MD;  Location: [facility] ENDOSCOPY;  Service: General;  Laterality: N/A;  colon polyp    ENDOSCOPY      HYSTERECTOMY      LUMBAR SPINE SURGERY      Microdiscectomy of thoracic or lumbar spine    TUBAL ABDOMINAL LIGATION       Family History   Problem Relation Age of Onset    Heart disease Mother     Thyroid disease Mother     Restless legs syndrome Mother     Obesity Mother     Insomnia Mother     Sleep walking Mother     Diabetes Brother     Obesity Brother     Heart disease Maternal Grandmother     Arrhythmia  "Maternal Grandfather     Heart attack Maternal Grandfather     Heart disease Maternal Grandfather     Stroke Other     Heart disease Other     Liver disease Other         cirrhosis    Diabetes Other     Malig Hyperthermia Neg Hx      Social History     Socioeconomic History    Marital status:    Tobacco Use    Smoking status: Former     Current packs/day: 0.00     Average packs/day: 0.3 packs/day for 15.0 years (3.8 ttl pk-yrs)     Types: Cigarettes     Start date: 1/1/1994     Quit date: 1/1/2009     Years since quitting: 15.5    Smokeless tobacco: Never    Tobacco comments:     off and on for 30 years; .23ppd   Vaping Use    Vaping status: Never Used   Substance and Sexual Activity    Alcohol use: Yes     Alcohol/week: 2.0 standard drinks of alcohol     Types: 2 Drinks containing 0.5 oz of alcohol per week     Comment: occ    Drug use: Not Currently    Sexual activity: Defer     Partners: Male     Birth control/protection: Vasectomy, Hysterectomy     Allergies   Allergen Reactions    Tomato Anaphylaxis    Latex Rash    Penicillins Unknown - Low Severity     \"My mother was very allergic.\"  \"My mother was very allergic.\"         Current Outpatient Medications:     busPIRone (BUSPAR) 7.5 MG tablet, TAKE 1 TABLET BY MOUTH TWICE DAILY AS NEEDED FOR ANXIETY, Disp: 60 tablet, Rfl: 2    EPINEPHrine (EPIPEN) 0.3 MG/0.3ML solution auto-injector injection, 0.3 mL., Disp: , Rfl:     escitalopram (LEXAPRO) 20 MG tablet, TAKE 1 TABLET BY MOUTH DAILY, Disp: 90 tablet, Rfl: 1    galcanezumab-gnlm (Emgality) 120 MG/ML auto-injector pen, Inject 1 mL under the skin into the appropriate area as directed Every 30 (Thirty) Days., Disp: 1.12 mL, Rfl: 3    hyoscyamine (ANASPAZ,LEVSIN) 0.125 MG tablet, Take 1 tablet by mouth Every 6 (Six) Hours As Needed., Disp: , Rfl:     losartan (COZAAR) 25 MG tablet, TAKE 1 TABLET BY MOUTH DAILY, Disp: 90 tablet, Rfl: 1    metoprolol succinate XL (TOPROL-XL) 50 MG 24 hr tablet, Take 1 tablet by " "mouth Daily., Disp: 90 tablet, Rfl: 3    ondansetron (ZOFRAN) 4 MG tablet, TAKE 1 TABLET BY MOUTH EVERY 8 HOURS AS NEEDED FOR NAUSEA OR VOMITING, Disp: 24 tablet, Rfl: 1    rosuvastatin (CRESTOR) 5 MG tablet, TAKE 1 TABLET BY MOUTH EVERY NIGHT, Disp: 90 tablet, Rfl: 1    traZODone (DESYREL) 50 MG tablet, Take 1 tablet by mouth Every Night., Disp: 90 tablet, Rfl: 1    Blood Glucose Monitoring Suppl device, Use as directed for blood glucose monitoring, Disp: 1 each, Rfl: 0    glucose blood test strip, Test blood glucose 1 times each day, Disp: 100 each, Rfl: 5    Lancets misc, Test blood glucose 1 times each day, Disp: 100 each, Rfl: 5    metFORMIN ER (GLUCOPHAGE-XR) 500 MG 24 hr tablet, Take 2 tablets by mouth Daily With Breakfast for 180 days., Disp: 180 tablet, Rfl: 1    Tirzepatide (Mounjaro) 5 MG/0.5ML solution pen-injector pen, Inject 0.5 mL under the skin into the appropriate area as directed Every 7 (Seven) Days., Disp: 2 mL, Rfl: 0    Objective     Vitals:    07/02/24 1141   BP: 123/68   Pulse: 97   SpO2: 97%   Weight: 93.2 kg (205 lb 6.4 oz)   Height: 167.6 cm (66\")     Body mass index is 33.15 kg/m².    Physical Exam  Constitutional:       Appearance: Normal appearance. She is obese.      Comments: Obesity (BMI 30 - 39.9) Pt Current BMI = 33.15     HENT:      Head: Normocephalic and atraumatic.      Right Ear: External ear normal.      Left Ear: External ear normal.      Nose: Nose normal.   Eyes:      Extraocular Movements: Extraocular movements intact.      Conjunctiva/sclera: Conjunctivae normal.   Pulmonary:      Effort: Pulmonary effort is normal.   Musculoskeletal:         General: Normal range of motion.      Cervical back: Normal range of motion.   Skin:     General: Skin is warm and dry.   Neurological:      General: No focal deficit present.      Mental Status: She is alert and oriented to person, place, and time. Mental status is at baseline.   Psychiatric:         Mood and Affect: Mood normal.   " "      Behavior: Behavior normal.         Thought Content: Thought content normal.         Judgment: Judgment normal.             Result Review :   The following data was reviewed by: DELIO Segundo on 07/02/2024:    Most Recent A1C          7/2/2024    11:53   HGBA1C Most Recent   Hemoglobin A1C 7.9        A1C Last 3 Results          9/15/2023    16:04 5/7/2024    10:05 7/2/2024    11:53   HGBA1C Last 3 Results   Hemoglobin A1C 6.70  7.80  7.9      A1c collected in the office today is 7.9%, indicating Uncontrolled Type II diabetes.  This result is down from the prior result of 7.8% collected on 5/7/24     Glucose   Date Value Ref Range Status   07/02/2024 129 (H) 70 - 99 mg/dL Final     Comment:     Serial Number: [id number]:  [id number]     Point of care glucose in the office today is within normal limits for nonfasting glucose    Creatinine   Date Value Ref Range Status   05/24/2024 0.59 0.57 - 1.00 mg/dL Final   11/21/2023 0.70 0.57 - 1.00 mg/dL Final     eGFR   Date Value Ref Range Status   05/24/2024 112.0 >60.0 mL/min/1.73 Final   11/21/2023 108.2 >60.0 mL/min/1.73 Final     Labs collected on 5/24/24 show Normal values    Microalbumin, Urine   Date Value Ref Range Status   11/21/2023 2.2 mg/dL Final   04/27/2023 1.9 mg/dL Final     No results found for: \"CREATININEUR\"  Microalbumin/Creatinine Ratio   Date Value Ref Range Status   04/20/2021 17.5 0.0 - 35.0 mg/g[Cre] Final     Urine microalbuminuria collected on 11/21/23 is negative for microalbuminuria    Total Cholesterol   Date Value Ref Range Status   05/24/2024 189 0 - 200 mg/dL Final   11/21/2023 106 0 - 200 mg/dL Final     Triglycerides   Date Value Ref Range Status   05/24/2024 472 (H) 0 - 150 mg/dL Final   11/21/2023 353 (H) 0 - 150 mg/dL Final     HDL Cholesterol   Date Value Ref Range Status   05/24/2024 45 40 - 60 mg/dL Final   11/21/2023 33 (L) 40 - 60 mg/dL Final     LDL Cholesterol    Date Value Ref Range Status   05/24/2024 71 " 0 - 100 mg/dL Final   11/21/2023 23 0 - 100 mg/dL Final     Lipid panel collected on 5/24/24 shows Hypertriglyceridemia            Assessment: Patient was referred to our clinic by her primary care provider for management of her diabetes. States she is frustrated because she exercises walking 1 mile per day and she cut out carbs and sugars but her weight keeps going up. States she has not been checking her glucose levels and she currently does not have a meter.  States she would like to go off metformin due to chronic diarrhea.  States she was switched to extended release metformin which worked well but her insurance would not cover it.  Her A1c in the office today is 7.9% which is up from her previous A1c of 7.8% in May.      Diagnoses and all orders for this visit:    1. Type 2 diabetes mellitus with hyperglycemia, without long-term current use of insulin (Primary)  -     POC Glycosylated Hemoglobin (Hb A1C)  -     Blood Glucose Monitoring Suppl device; Use as directed for blood glucose monitoring  Dispense: 1 each; Refill: 0  -     glucose blood test strip; Test blood glucose 1 times each day  Dispense: 100 each; Refill: 5  -     Lancets misc; Test blood glucose 1 times each day  Dispense: 100 each; Refill: 5  -     metFORMIN ER (GLUCOPHAGE-XR) 500 MG 24 hr tablet; Take 2 tablets by mouth Daily With Breakfast for 180 days.  Dispense: 180 tablet; Refill: 1  -     Tirzepatide (Mounjaro) 5 MG/0.5ML solution pen-injector pen; Inject 0.5 mL under the skin into the appropriate area as directed Every 7 (Seven) Days.  Dispense: 2 mL; Refill: 0    2. Obesity (BMI 30-39.9)    3. Type 2 diabetes mellitus with diabetic neuropathy, without long-term current use of insulin    4. Uncontrolled type 2 diabetes mellitus with hyperglycemia    Other orders  -     POC Glucose        Plan: Increased her dose of Mounjaro from 2.5 mg once weekly to 5 mg once weekly.  Instructed patient she can finish her current prescription for the 2.5  mg dose by taking 2 injections to equal 5 mg until she gets her new prescription for 5 mg dose.  Switched her metformin from regular metformin to extended release metformin.  This prescription was sent to [facility] for a $4 prescription since she is intolerant to the regular metformin.  A glucometer and supplies sent to her pharmacy.  Instructed patient to keep a blood sugar log and bring it to her next visit for review.  Scheduled a 6-week follow-up.    The patient will monitor her blood glucose levels once daily.  If she develops problematic hyperglycemia or hypoglycemia or adverse drug reactions, she will contact the office for further instructions.        Follow Up     Return in about 6 weeks (around 8/13/2024) for CGM Follow Up, Medication Mgmt.    Patient was given instructions and counseling regarding her condition or for health maintenance advice. Please see specific information pulled into the AVS if appropriate.     Asuncion Barnard, DELIO  07/02/2024      Dictated Utilizing Dragon Dictation.  Please note that portions of this note were completed with a voice recognition program.  Part of this note may be an electronic transcription/translation of spoken language to printed text using the Dragon Dictation System.

## 2024-07-11 DIAGNOSIS — E11.69 TYPE 2 DIABETES MELLITUS WITH OTHER SPECIFIED COMPLICATION, WITHOUT LONG-TERM CURRENT USE OF INSULIN: ICD-10-CM

## 2024-07-11 DIAGNOSIS — I10 ESSENTIAL HYPERTENSION: ICD-10-CM

## 2024-07-11 RX ORDER — LOSARTAN POTASSIUM 25 MG/1
25 TABLET ORAL DAILY
Qty: 90 TABLET | Refills: 1 | Status: SHIPPED | OUTPATIENT
Start: 2024-07-11

## 2024-07-15 DIAGNOSIS — F41.9 ANXIETY DISORDER, UNSPECIFIED TYPE: ICD-10-CM

## 2024-07-15 DIAGNOSIS — F33.0 MILD EPISODE OF RECURRENT MAJOR DEPRESSIVE DISORDER: ICD-10-CM

## 2024-07-15 RX ORDER — ESCITALOPRAM OXALATE 20 MG/1
20 TABLET ORAL DAILY
Qty: 90 TABLET | Refills: 1 | Status: SHIPPED | OUTPATIENT
Start: 2024-07-15

## 2024-07-16 ENCOUNTER — HOSPITAL ENCOUNTER (OUTPATIENT)
Dept: MAMMOGRAPHY | Facility: HOSPITAL | Age: 49
Discharge: HOME | End: 2024-07-16
Admitting: NURSE PRACTITIONER
Payer: COMMERCIAL

## 2024-07-16 DIAGNOSIS — Z00.00 ANNUAL PHYSICAL EXAM: ICD-10-CM

## 2024-07-16 DIAGNOSIS — Z12.31 ENCOUNTER FOR SCREENING MAMMOGRAM FOR MALIGNANT NEOPLASM OF BREAST: ICD-10-CM

## 2024-07-16 PROCEDURE — 77063 BREAST TOMOSYNTHESIS BI: CPT

## 2024-07-16 PROCEDURE — 77067 SCR MAMMO BI INCL CAD: CPT

## 2024-07-29 ENCOUNTER — OFFICE VISIT (OUTPATIENT)
Dept: FAMILY MEDICINE CLINIC | Facility: CLINIC | Age: 49
End: 2024-07-29
Payer: COMMERCIAL

## 2024-07-29 VITALS
OXYGEN SATURATION: 96 % | WEIGHT: 200 LBS | SYSTOLIC BLOOD PRESSURE: 121 MMHG | HEART RATE: 72 BPM | HEIGHT: 66 IN | BODY MASS INDEX: 32.14 KG/M2 | DIASTOLIC BLOOD PRESSURE: 63 MMHG

## 2024-07-29 DIAGNOSIS — F41.9 ANXIETY: Primary | ICD-10-CM

## 2024-07-29 DIAGNOSIS — R11.0 NAUSEA: ICD-10-CM

## 2024-07-29 DIAGNOSIS — F51.01 PRIMARY INSOMNIA: ICD-10-CM

## 2024-07-29 DIAGNOSIS — E78.5 HYPERLIPIDEMIA, UNSPECIFIED HYPERLIPIDEMIA TYPE: ICD-10-CM

## 2024-07-29 DIAGNOSIS — T78.40XD ALLERGY, SUBSEQUENT ENCOUNTER: ICD-10-CM

## 2024-07-29 DIAGNOSIS — F32.A DEPRESSION, UNSPECIFIED DEPRESSION TYPE: ICD-10-CM

## 2024-07-29 DIAGNOSIS — I10 PRIMARY HYPERTENSION: ICD-10-CM

## 2024-07-29 DIAGNOSIS — E11.9 TYPE 2 DIABETES MELLITUS WITHOUT COMPLICATION, WITHOUT LONG-TERM CURRENT USE OF INSULIN: ICD-10-CM

## 2024-07-29 PROCEDURE — 99214 OFFICE O/P EST MOD 30 MIN: CPT | Performed by: NURSE PRACTITIONER

## 2024-07-29 RX ORDER — ONDANSETRON 4 MG/1
4 TABLET, FILM COATED ORAL EVERY 8 HOURS PRN
Qty: 24 TABLET | Refills: 1 | Status: SHIPPED | OUTPATIENT
Start: 2024-07-29

## 2024-07-29 RX ORDER — EPINEPHRINE 0.3 MG/.3ML
0.3 INJECTION SUBCUTANEOUS ONCE
Qty: 1 EACH | Refills: 0 | Status: SHIPPED | OUTPATIENT
Start: 2024-07-29 | End: 2024-07-29

## 2024-08-13 ENCOUNTER — OFFICE VISIT (OUTPATIENT)
Dept: DIABETES SERVICES | Facility: HOSPITAL | Age: 49
End: 2024-08-13
Payer: COMMERCIAL

## 2024-08-13 VITALS
BODY MASS INDEX: 32.43 KG/M2 | WEIGHT: 201.8 LBS | OXYGEN SATURATION: 96 % | SYSTOLIC BLOOD PRESSURE: 108 MMHG | HEIGHT: 66 IN | DIASTOLIC BLOOD PRESSURE: 57 MMHG | HEART RATE: 81 BPM

## 2024-08-13 DIAGNOSIS — E66.9 OBESITY (BMI 30-39.9): ICD-10-CM

## 2024-08-13 DIAGNOSIS — E11.40 TYPE 2 DIABETES MELLITUS WITH DIABETIC NEUROPATHY, WITHOUT LONG-TERM CURRENT USE OF INSULIN: ICD-10-CM

## 2024-08-13 DIAGNOSIS — E11.65 UNCONTROLLED TYPE 2 DIABETES MELLITUS WITH HYPERGLYCEMIA: ICD-10-CM

## 2024-08-13 DIAGNOSIS — E11.65 TYPE 2 DIABETES MELLITUS WITH HYPERGLYCEMIA, WITHOUT LONG-TERM CURRENT USE OF INSULIN: Primary | ICD-10-CM

## 2024-08-13 LAB — GLUCOSE BLDC GLUCOMTR-MCNC: 138 MG/DL (ref 70–99)

## 2024-08-13 PROCEDURE — G0463 HOSPITAL OUTPT CLINIC VISIT: HCPCS | Performed by: NURSE PRACTITIONER

## 2024-08-13 PROCEDURE — 82948 REAGENT STRIP/BLOOD GLUCOSE: CPT | Performed by: NURSE PRACTITIONER

## 2024-08-13 PROCEDURE — 99214 OFFICE O/P EST MOD 30 MIN: CPT | Performed by: NURSE PRACTITIONER

## 2024-08-13 NOTE — PROGRESS NOTES
Chief Complaint  Diabetes (Follow up, med mgt)    Referred By: DELIO Gutierrez presents to [facility] for diabetes medication management    History of Present Illness    Visit type:  follow-up  Diabetes type:  Type 2  Current diabetes status/concerns/issues:  Reports she is tolerating the increased dose of Mounjaro. She has lost 4lbs since her last visit. States her average glucose is 142mg/dl fasting. States switching from regular metformin to ER has resolved her GI issues.   Other health concerns: No new health concerns  Current Diabetes symptoms:    Polyuria: No   Polydipsia: No   Polyphagia: Yes     Blurred vision: No   Excessive fatigue: No  Known Diabetes complications:  Neuropathy: Numbness and Tingling; Location: Feet  Renal: None  Eyes: No Retinopathy reported on current eye exam; Location: Bilateral; Last Eye Exam:  5/22/24 ; Location: [facility]  Amputation/Wounds:  slow to heal  GI: Diarrhea  Cardiovascular: Hyperlipidemia and Other: tachycardia-POTS  ED: None  Other: None  Hypoglycemia:  None reported at this time  Hypoglycemia Symptoms:  No hypoglycemia at this time  Current diabetes treatment:    Mounjaro 5mg once wk, metformin er 1000mg bid   Blood glucose device:  Meter  Blood glucose monitoring frequency:  1  Blood glucose range/average:   average of 142mg/dl.   Glucose Source: Device Reviewed  Dietary behavior:  Limits high carb/sweet foods, Avoids sugary drinks, Number of meals each day - 3; Number of snacks each day - 1  Activity/Exercise:   walks 1 mile daily, swim, eliptical    Past Medical History:   Diagnosis Date    Allergic rhinitis due to allergen     Anxiety disorder     Arthritis     Chest pain     NONE CURRENT, OCC SOAE    Depression     Diabetes mellitus, type 2     Essential hypertension 06/22/2021    GERD (gastroesophageal reflux disease)     Hyperlipidemia     Migraine     Wide-complex tachycardia  10/10/2023     Past Surgical History:   Procedure Laterality Date    CARDIAC CATHETERIZATION Left 08/30/2023    Procedure: Left Heart Cath with possible coronary angioplasty;  Surgeon: Mariam Buckner MD;  Location: [facility] CATH INVASIVE LOCATION;  Service: Cardiology;  Laterality: Left;    CARDIAC CATHETERIZATION  8/30/2023    CARPAL TUNNEL RELEASE Bilateral     COLONOSCOPY      COLONOSCOPY N/A 09/08/2022    Procedure: COLONOSCOPY with cold snare polypectomy;  Surgeon: Keshawn Yates MD;  Location: [facility] ENDOSCOPY;  Service: General;  Laterality: N/A;  colon polyp    ENDOSCOPY      HYSTERECTOMY      LUMBAR SPINE SURGERY      Microdiscectomy of thoracic or lumbar spine    TUBAL ABDOMINAL LIGATION       Family History   Problem Relation Age of Onset    Heart disease Mother     Thyroid disease Mother     Restless legs syndrome Mother     Obesity Mother     Insomnia Mother     Sleep walking Mother     Diabetes Brother     Obesity Brother     Heart disease Maternal Grandmother     Arrhythmia Maternal Grandfather     Heart attack Maternal Grandfather     Heart disease Maternal Grandfather     Stroke Other     Heart disease Other     Liver disease Other         cirrhosis    Diabetes Other     Malig Hyperthermia Neg Hx      Social History     Socioeconomic History    Marital status:    Tobacco Use    Smoking status: Former     Current packs/day: 0.00     Average packs/day: 0.3 packs/day for 15.0 years (3.8 ttl pk-yrs)     Types: Cigarettes     Start date: 1/1/1994     Quit date: 1/1/2009     Years since quitting: 15.6    Smokeless tobacco: Never    Tobacco comments:     off and on for 30 years; .23ppd   Vaping Use    Vaping status: Never Used   Substance and Sexual Activity    Alcohol use: Yes     Alcohol/week: 2.0 standard drinks of alcohol     Types: 2 Drinks containing 0.5 oz of alcohol per week     Comment: occ    Drug use: Not Currently    Sexual activity: Defer     Partners: Male     Birth  "control/protection: Vasectomy, Hysterectomy     Allergies   Allergen Reactions    Tomato Anaphylaxis    Latex Rash    Penicillins Unknown - Low Severity     \"My mother was very allergic.\"  \"My mother was very allergic.\"         Current Outpatient Medications:     Blood Glucose Monitoring Suppl device, Use as directed for blood glucose monitoring, Disp: 1 each, Rfl: 0    busPIRone (BUSPAR) 7.5 MG tablet, TAKE 1 TABLET BY MOUTH TWICE DAILY AS NEEDED FOR ANXIETY, Disp: 60 tablet, Rfl: 2    escitalopram (LEXAPRO) 20 MG tablet, TAKE 1 TABLET BY MOUTH DAILY, Disp: 90 tablet, Rfl: 1    galcanezumab-gnlm (Emgality) 120 MG/ML auto-injector pen, Inject 1 mL under the skin into the appropriate area as directed Every 30 (Thirty) Days., Disp: 1.12 mL, Rfl: 3    glucose blood test strip, Test blood glucose 1 times each day, Disp: 100 each, Rfl: 5    hyoscyamine (ANASPAZ,LEVSIN) 0.125 MG tablet, Take 1 tablet by mouth Every 6 (Six) Hours As Needed., Disp: , Rfl:     Lancets misc, Test blood glucose 1 times each day, Disp: 100 each, Rfl: 5    losartan (COZAAR) 25 MG tablet, TAKE 1 TABLET BY MOUTH DAILY, Disp: 90 tablet, Rfl: 1    metFORMIN ER (GLUCOPHAGE-XR) 500 MG 24 hr tablet, Take 2 tablets by mouth Daily With Breakfast for 180 days., Disp: 180 tablet, Rfl: 1    metoprolol succinate XL (TOPROL-XL) 50 MG 24 hr tablet, Take 1 tablet by mouth Daily., Disp: 90 tablet, Rfl: 3    ondansetron (ZOFRAN) 4 MG tablet, Take 1 tablet by mouth Every 8 (Eight) Hours As Needed for Nausea or Vomiting., Disp: 24 tablet, Rfl: 1    rosuvastatin (CRESTOR) 5 MG tablet, TAKE 1 TABLET BY MOUTH EVERY NIGHT, Disp: 90 tablet, Rfl: 1    traZODone (DESYREL) 50 MG tablet, Take 1 tablet by mouth Every Night., Disp: 90 tablet, Rfl: 1    Tirzepatide (Mounjaro) 7.5 MG/0.5ML solution pen-injector pen, Inject 0.5 mL under the skin into the appropriate area as directed Every 7 (Seven) Days., Disp: 2 mL, Rfl: 0    Objective     Vitals:    08/13/24 1637   BP: 108/57 " "  Pulse: 81   SpO2: 96%   Weight: 91.5 kg (201 lb 12.8 oz)   Height: 167.6 cm (66\")   PainSc: 0-No pain     Body mass index is 32.57 kg/m².    Physical Exam  Constitutional:       Appearance: Normal appearance. She is obese.      Comments: Obesity (BMI 30 - 39.9) Pt Current BMI = 32.57     HENT:      Head: Normocephalic and atraumatic.      Right Ear: External ear normal.      Left Ear: External ear normal.      Nose: Nose normal.   Eyes:      Extraocular Movements: Extraocular movements intact.      Conjunctiva/sclera: Conjunctivae normal.   Pulmonary:      Effort: Pulmonary effort is normal.   Musculoskeletal:         General: Normal range of motion.      Cervical back: Normal range of motion.   Skin:     General: Skin is warm and dry.   Neurological:      General: No focal deficit present.      Mental Status: She is alert and oriented to person, place, and time. Mental status is at baseline.   Psychiatric:         Mood and Affect: Mood normal.         Behavior: Behavior normal.         Thought Content: Thought content normal.         Judgment: Judgment normal.             Result Review :   The following data was reviewed by: DELIO Segundo on 08/13/2024:    Most Recent A1C          7/2/2024    11:53   HGBA1C Most Recent   Hemoglobin A1C 7.9        A1C Last 3 Results          9/15/2023    16:04 5/7/2024    10:05 7/2/2024    11:53   HGBA1C Last 3 Results   Hemoglobin A1C 6.70  7.80  7.9      A1c collected on 7/2/24  is 7.9%, indicating Uncontrolled Type II diabetes.  This result is up from the prior result of 7.8% collected on 5/7/24     Glucose   Date Value Ref Range Status   08/13/2024 138 (H) 70 - 99 mg/dL Final     Comment:     Serial Number: [id number]:  [id number]     Point of care glucose in the office today is within normal limits for nonfasting glucose    Creatinine   Date Value Ref Range Status   05/24/2024 0.59 0.57 - 1.00 mg/dL Final   11/21/2023 0.70 0.57 - 1.00 mg/dL Final     eGFR "   Date Value Ref Range Status   05/24/2024 112.0 >60.0 mL/min/1.73 Final   11/21/2023 108.2 >60.0 mL/min/1.73 Final     Labs collected on 5/24/24 show Normal values        Total Cholesterol   Date Value Ref Range Status   05/24/2024 189 0 - 200 mg/dL Final   11/21/2023 106 0 - 200 mg/dL Final     Triglycerides   Date Value Ref Range Status   05/24/2024 472 (H) 0 - 150 mg/dL Final   11/21/2023 353 (H) 0 - 150 mg/dL Final     HDL Cholesterol   Date Value Ref Range Status   05/24/2024 45 40 - 60 mg/dL Final   11/21/2023 33 (L) 40 - 60 mg/dL Final     LDL Cholesterol    Date Value Ref Range Status   05/24/2024 71 0 - 100 mg/dL Final   11/21/2023 23 0 - 100 mg/dL Final     Lipid panel collected on 5/24/24 shows Hypertriglyceridemia            Assessment: Pt is here today for a 5 wk follow up on her diabetes. Last visit Mounjaro was increased to 5mg weekly. Reports she is tolerating the increased dose of Mounjaro w/o any side effects. She has lost 4lbs since her last visit. States her average glucose is 142mg/dl fasting. States switching from regular metformin to ER has resolved her GI issues. Her A1c on 7/2/24 was 7.9% which is up from her previous A1c of 7.8% in May.       Diagnoses and all orders for this visit:    1. Type 2 diabetes mellitus with hyperglycemia, without long-term current use of insulin (Primary)  -     Tirzepatide (Mounjaro) 7.5 MG/0.5ML solution pen-injector pen; Inject 0.5 mL under the skin into the appropriate area as directed Every 7 (Seven) Days.  Dispense: 2 mL; Refill: 0  -     glucose blood test strip; Test blood glucose 1 times each day  Dispense: 100 each; Refill: 5    2. Obesity (BMI 30-39.9)    3. Type 2 diabetes mellitus with diabetic neuropathy, without long-term current use of insulin    4. Uncontrolled type 2 diabetes mellitus with hyperglycemia    Other orders  -     POC Glucose        Plan: Increased her dose of Mounjaro to 7.5mg once wk. Instructed pt to call monthly to taper up on  her dose of Mounjaro. Discussed plan is to increase her dose of Mounjaro and taper off the Metformin. Scheduled a 2 month follow up and we will recheck her A1c at that time.     The patient will monitor her blood glucose levels per CGM.  If she develops problematic hyperglycemia or hypoglycemia or adverse drug reactions, she will contact the office for further instructions.        Follow Up     Return in about 2 months (around 10/13/2024) for Medication Mgmt, CGM Follow Up.    Patient was given instructions and counseling regarding her condition or for health maintenance advice. Please see specific information pulled into the AVS if appropriate.     Asuncion Barnard, APRN  08/13/2024      Dictated Utilizing Dragon Dictation.  Please note that portions of this note were completed with a voice recognition program.  Part of this note may be an electronic transcription/translation of spoken language to printed text using the Dragon Dictation System.

## 2024-08-13 NOTE — PATIENT INSTRUCTIONS
Increase Mounjaro to 7.5 mg once weekly.  Make sure to this injection on the same day of the week.

## 2024-08-28 ENCOUNTER — OFFICE VISIT (OUTPATIENT)
Dept: FAMILY MEDICINE CLINIC | Facility: CLINIC | Age: 49
End: 2024-08-28
Payer: COMMERCIAL

## 2024-08-28 ENCOUNTER — LAB (OUTPATIENT)
Dept: LAB | Facility: HOSPITAL | Age: 49
End: 2024-08-28
Payer: COMMERCIAL

## 2024-08-28 VITALS
WEIGHT: 195 LBS | DIASTOLIC BLOOD PRESSURE: 51 MMHG | SYSTOLIC BLOOD PRESSURE: 122 MMHG | BODY MASS INDEX: 31.34 KG/M2 | HEIGHT: 66 IN | HEART RATE: 68 BPM | OXYGEN SATURATION: 98 %

## 2024-08-28 DIAGNOSIS — R52 BODY ACHES: Primary | ICD-10-CM

## 2024-08-28 DIAGNOSIS — E11.9 TYPE 2 DIABETES MELLITUS WITHOUT COMPLICATION, WITHOUT LONG-TERM CURRENT USE OF INSULIN: ICD-10-CM

## 2024-08-28 DIAGNOSIS — F41.9 ANXIETY: ICD-10-CM

## 2024-08-28 DIAGNOSIS — G43.701 CHRONIC MIGRAINE WITHOUT AURA WITH STATUS MIGRAINOSUS, NOT INTRACTABLE: ICD-10-CM

## 2024-08-28 DIAGNOSIS — I10 PRIMARY HYPERTENSION: ICD-10-CM

## 2024-08-28 DIAGNOSIS — F32.A DEPRESSION, UNSPECIFIED DEPRESSION TYPE: ICD-10-CM

## 2024-08-28 DIAGNOSIS — F51.01 PRIMARY INSOMNIA: ICD-10-CM

## 2024-08-28 DIAGNOSIS — E78.5 HYPERLIPIDEMIA, UNSPECIFIED HYPERLIPIDEMIA TYPE: ICD-10-CM

## 2024-08-28 DIAGNOSIS — R52 BODY ACHES: ICD-10-CM

## 2024-08-28 LAB
ALBUMIN SERPL-MCNC: 4.5 G/DL (ref 3.5–5.2)
ALBUMIN/GLOB SERPL: 1.5 G/DL
ALP SERPL-CCNC: 47 U/L (ref 39–117)
ALT SERPL W P-5'-P-CCNC: 39 U/L (ref 1–33)
ANION GAP SERPL CALCULATED.3IONS-SCNC: 12 MMOL/L (ref 5–15)
AST SERPL-CCNC: 25 U/L (ref 1–32)
BASOPHILS # BLD AUTO: 0.02 10*3/MM3 (ref 0–0.2)
BASOPHILS NFR BLD AUTO: 0.3 % (ref 0–1.5)
BILIRUB SERPL-MCNC: 0.5 MG/DL (ref 0–1.2)
BUN SERPL-MCNC: 16 MG/DL (ref 6–20)
BUN/CREAT SERPL: 21.3 (ref 7–25)
CALCIUM SPEC-SCNC: 9.8 MG/DL (ref 8.6–10.5)
CHLORIDE SERPL-SCNC: 102 MMOL/L (ref 98–107)
CO2 SERPL-SCNC: 26 MMOL/L (ref 22–29)
CREAT SERPL-MCNC: 0.75 MG/DL (ref 0.57–1)
DEPRECATED RDW RBC AUTO: 43.8 FL (ref 37–54)
EGFRCR SERPLBLD CKD-EPI 2021: 99 ML/MIN/1.73
EOSINOPHIL # BLD AUTO: 0.03 10*3/MM3 (ref 0–0.4)
EOSINOPHIL NFR BLD AUTO: 0.4 % (ref 0.3–6.2)
ERYTHROCYTE [DISTWIDTH] IN BLOOD BY AUTOMATED COUNT: 13 % (ref 12.3–15.4)
GLOBULIN UR ELPH-MCNC: 3.1 GM/DL
GLUCOSE SERPL-MCNC: 164 MG/DL (ref 65–99)
HCT VFR BLD AUTO: 44.2 % (ref 34–46.6)
HGB BLD-MCNC: 14.5 G/DL (ref 12–15.9)
IMM GRANULOCYTES # BLD AUTO: 0.02 10*3/MM3 (ref 0–0.05)
IMM GRANULOCYTES NFR BLD AUTO: 0.3 % (ref 0–0.5)
LYMPHOCYTES # BLD AUTO: 2.85 10*3/MM3 (ref 0.7–3.1)
LYMPHOCYTES NFR BLD AUTO: 42.2 % (ref 19.6–45.3)
MCH RBC QN AUTO: 30.5 PG (ref 26.6–33)
MCHC RBC AUTO-ENTMCNC: 32.8 G/DL (ref 31.5–35.7)
MCV RBC AUTO: 92.9 FL (ref 79–97)
MONOCYTES # BLD AUTO: 0.61 10*3/MM3 (ref 0.1–0.9)
MONOCYTES NFR BLD AUTO: 9 % (ref 5–12)
NEUTROPHILS NFR BLD AUTO: 3.22 10*3/MM3 (ref 1.7–7)
NEUTROPHILS NFR BLD AUTO: 47.8 % (ref 42.7–76)
NRBC BLD AUTO-RTO: 0 /100 WBC (ref 0–0.2)
PLATELET # BLD AUTO: 265 10*3/MM3 (ref 140–450)
PMV BLD AUTO: 11.1 FL (ref 6–12)
POTASSIUM SERPL-SCNC: 4.1 MMOL/L (ref 3.5–5.2)
PROT SERPL-MCNC: 7.6 G/DL (ref 6–8.5)
RBC # BLD AUTO: 4.76 10*6/MM3 (ref 3.77–5.28)
SODIUM SERPL-SCNC: 140 MMOL/L (ref 136–145)
WBC NRBC COR # BLD AUTO: 6.75 10*3/MM3 (ref 3.4–10.8)

## 2024-08-28 PROCEDURE — 36415 COLL VENOUS BLD VENIPUNCTURE: CPT

## 2024-08-28 PROCEDURE — 86757 RICKETTSIA ANTIBODY: CPT

## 2024-08-28 PROCEDURE — 99214 OFFICE O/P EST MOD 30 MIN: CPT | Performed by: NURSE PRACTITIONER

## 2024-08-28 PROCEDURE — 86618 LYME DISEASE ANTIBODY: CPT

## 2024-08-28 PROCEDURE — 85025 COMPLETE CBC W/AUTO DIFF WBC: CPT

## 2024-08-28 PROCEDURE — 80053 COMPREHEN METABOLIC PANEL: CPT

## 2024-08-28 NOTE — PROGRESS NOTES
"Chief Complaint  Collar Bone Pain, Anxiety, Depression, Migraine, HTN, Diabetes 2, HLD, Insomnia    Subjective            Katy Denise presents to [facility]  History of Present Illness  PT here for f/u on Anxiety, Depression, Migraines, HTN, Diabetes 2, HLD, and Insomnia . PT went to  last week for clavicle pain/swelling and had a negative xray and was given steroids.  She states the pain and swelling has resolved.  She was advised to f/u with PCP.    Pt reports she has 2 days left of medrol pack. She reports she does have some generalized body aches but no other symptoms.  She denies fever or any insect/tick bites that she knows of.        Past Medical History:   Diagnosis Date    Allergic rhinitis due to allergen     Anxiety disorder     Arthritis     Chest pain     NONE CURRENT, OCC SOAE    Depression     Diabetes mellitus, type 2     Essential hypertension 06/22/2021    GERD (gastroesophageal reflux disease)     Hyperlipidemia     Migraine     Wide-complex tachycardia 10/10/2023       Allergies   Allergen Reactions    Tomato Anaphylaxis    Latex Rash    Penicillins Unknown - Low Severity     \"My mother was very allergic.\"  \"My mother was very allergic.\"          Past Surgical History:   Procedure Laterality Date    CARDIAC CATHETERIZATION Left 08/30/2023    Procedure: Left Heart Cath with possible coronary angioplasty;  Surgeon: Mariam Buckner MD;  Location: [facility] CATH INVASIVE LOCATION;  Service: Cardiology;  Laterality: Left;    CARDIAC CATHETERIZATION  8/30/2023    CARPAL TUNNEL RELEASE Bilateral     COLONOSCOPY      COLONOSCOPY N/A 09/08/2022    Procedure: COLONOSCOPY with cold snare polypectomy;  Surgeon: Keshawn Yates MD;  Location: [facility] ENDOSCOPY;  Service: General;  Laterality: N/A;  colon polyp    ENDOSCOPY      HYSTERECTOMY      LUMBAR SPINE SURGERY      Microdiscectomy of thoracic or lumbar spine    TUBAL ABDOMINAL LIGATION          Social " History     Tobacco Use    Smoking status: Former     Current packs/day: 0.00     Average packs/day: 0.3 packs/day for 15.0 years (3.8 ttl pk-yrs)     Types: Cigarettes     Start date: 1/1/1994     Quit date: 1/1/2009     Years since quitting: 15.6    Smokeless tobacco: Never    Tobacco comments:     off and on for 30 years; .23ppd   Substance Use Topics    Alcohol use: Yes     Alcohol/week: 2.0 standard drinks of alcohol     Types: 2 Drinks containing 0.5 oz of alcohol per week     Comment: occ       Family History   Problem Relation Age of Onset    Heart disease Mother     Thyroid disease Mother     Restless legs syndrome Mother     Obesity Mother     Insomnia Mother     Sleep walking Mother     Diabetes Brother     Obesity Brother     Heart disease Maternal Grandmother     Arrhythmia Maternal Grandfather     Heart attack Maternal Grandfather     Heart disease Maternal Grandfather     Stroke Other     Heart disease Other     Liver disease Other         cirrhosis    Diabetes Other     Malig Hyperthermia Neg Hx         Current Outpatient Medications on File Prior to Visit   Medication Sig    Blood Glucose Monitoring Suppl device Use as directed for blood glucose monitoring    busPIRone (BUSPAR) 7.5 MG tablet TAKE 1 TABLET BY MOUTH TWICE DAILY AS NEEDED FOR ANXIETY    escitalopram (LEXAPRO) 20 MG tablet TAKE 1 TABLET BY MOUTH DAILY    galcanezumab-gnlm (Emgality) 120 MG/ML auto-injector pen Inject 1 mL under the skin into the appropriate area as directed Every 30 (Thirty) Days.    glucose blood test strip Test blood glucose 1 times each day    Lancets misc Test blood glucose 1 times each day    losartan (COZAAR) 25 MG tablet TAKE 1 TABLET BY MOUTH DAILY    metFORMIN ER (GLUCOPHAGE-XR) 500 MG 24 hr tablet Take 2 tablets by mouth Daily With Breakfast for 180 days.    metoprolol succinate XL (TOPROL-XL) 50 MG 24 hr tablet Take 1 tablet by mouth Daily.    ondansetron (ZOFRAN) 4 MG tablet Take 1 tablet by mouth Every 8  "(Eight) Hours As Needed for Nausea or Vomiting.    predniSONE 5 MG (21) tablet therapy pack dose pack Take 1 tablet by mouth Take As Directed for 6 days. Take as directed on package instructions.    rosuvastatin (CRESTOR) 5 MG tablet TAKE 1 TABLET BY MOUTH EVERY NIGHT    Tirzepatide (Mounjaro) 7.5 MG/0.5ML solution pen-injector pen Inject 0.5 mL under the skin into the appropriate area as directed Every 7 (Seven) Days.    traZODone (DESYREL) 50 MG tablet Take 1 tablet by mouth Every Night.    [DISCONTINUED] hyoscyamine (ANASPAZ,LEVSIN) 0.125 MG tablet Take 1 tablet by mouth Every 6 (Six) Hours As Needed. (Patient not taking: Reported on 8/28/2024)     No current facility-administered medications on file prior to visit.       There are no preventive care reminders to display for this patient.      Objective     /51   Pulse 68   Ht 167.6 cm (66\")   Wt 88.5 kg (195 lb)   SpO2 98%   BMI 31.47 kg/m²       Physical Exam  Constitutional:       General: She is not in acute distress.     Appearance: Normal appearance. She is not ill-appearing.   HENT:      Head: Normocephalic and atraumatic.      Right Ear: Tympanic membrane, ear canal and external ear normal.      Left Ear: Tympanic membrane, ear canal and external ear normal.      Nose: Nose normal.   Cardiovascular:      Rate and Rhythm: Normal rate and regular rhythm.      Pulses: Normal pulses.      Heart sounds: Normal heart sounds. No murmur heard.  Pulmonary:      Effort: Pulmonary effort is normal. No respiratory distress.      Breath sounds: Normal breath sounds.   Chest:      Chest wall: No tenderness.   Musculoskeletal:         General: No swelling or tenderness. Normal range of motion.      Cervical back: Normal range of motion and neck supple.   Lymphadenopathy:      Head:      Right side of head: No submental, submandibular, tonsillar, preauricular, posterior auricular or occipital adenopathy.      Left side of head: No submental, submandibular, " tonsillar, preauricular, posterior auricular or occipital adenopathy.      Cervical: No cervical adenopathy.      Right cervical: No superficial, deep or posterior cervical adenopathy.     Left cervical: No superficial, deep or posterior cervical adenopathy.      Upper Body:      Right upper body: No supraclavicular adenopathy.      Left upper body: No supraclavicular adenopathy.   Skin:     General: Skin is warm and dry.      Findings: No rash.   Neurological:      General: No focal deficit present.      Mental Status: She is alert and oriented to person, place, and time. Mental status is at baseline.      Gait: Gait normal.   Psychiatric:         Mood and Affect: Mood normal.         Behavior: Behavior normal.         Thought Content: Thought content normal.         Judgment: Judgment normal.           Result Review :                           Assessment and Plan        Diagnoses and all orders for this visit:    1. Body aches (Primary)  -     CBC w AUTO Differential; Future  -     Comprehensive metabolic panel; Future  -     Lyme Disease Total Antibody With Reflex to Immunoassay; Future  -     Spotted Fever Group AB, IgG/IgM; Future    2. Anxiety  Comments:  stableon Lexapro 20mg and Busapr 7.5mg, continue    3. Depression, unspecified depression type  Comments:  stable on Lexapro 20mg, continue    4. Chronic migraine without aura with status migrainosus, not intractable  Comments:  stable on Emgality, continue    5. Primary hypertension  Comments:  stable on Toprol 50mg and Cozaar 25mg, continue    6. Type 2 diabetes mellitus without complication, without long-term current use of insulin  Comments:  stable on Metfomrin 500mg, continue    7. Hyperlipidemia, unspecified hyperlipidemia type  Comments:  stable on Crestor 5mg, continue    8. Primary insomnia  Comments:  stable on Trazodone 50mg, continue              Follow Up     Return in about 6 months (around 2/28/2025), or if symptoms worsen or fail to  improve.    Patient was given instructions and counseling regarding her condition or for health maintenance advice. Please see specific information pulled into the AVS if appropriate.     Katy Denise  reports that she quit smoking about 15 years ago. Her smoking use included cigarettes. She started smoking about 30 years ago. She has a 3.8 pack-year smoking history. She has never used smokeless tobacco.

## 2024-08-29 LAB — B BURGDOR IGG+IGM SER QL IA: NEGATIVE

## 2024-09-09 LAB
RESULT COMMENT:: NORMAL
RICK SF IGG TITR SER: NORMAL {TITER}
RICK SF IGM TITR SER: NORMAL {TITER}

## 2024-09-23 DIAGNOSIS — E11.65 TYPE 2 DIABETES MELLITUS WITH HYPERGLYCEMIA, WITHOUT LONG-TERM CURRENT USE OF INSULIN: ICD-10-CM

## 2024-09-23 RX ORDER — TIRZEPATIDE 7.5 MG/.5ML
INJECTION, SOLUTION SUBCUTANEOUS
Qty: 2 ML | Refills: 0 | Status: SHIPPED | OUTPATIENT
Start: 2024-09-23

## 2024-09-24 DIAGNOSIS — G43.701 CHRONIC MIGRAINE WITHOUT AURA WITH STATUS MIGRAINOSUS, NOT INTRACTABLE: Primary | ICD-10-CM

## 2024-09-25 DIAGNOSIS — F51.01 PRIMARY INSOMNIA: ICD-10-CM

## 2024-09-25 RX ORDER — GALCANEZUMAB 120 MG/ML
INJECTION, SOLUTION SUBCUTANEOUS
Qty: 3 ML | Refills: 1 | Status: SHIPPED | OUTPATIENT
Start: 2024-09-25

## 2024-09-25 RX ORDER — TRAZODONE HYDROCHLORIDE 50 MG/1
50 TABLET, FILM COATED ORAL NIGHTLY
Qty: 90 TABLET | Refills: 1 | Status: SHIPPED | OUTPATIENT
Start: 2024-09-25

## 2024-10-15 DIAGNOSIS — E11.65 TYPE 2 DIABETES MELLITUS WITH HYPERGLYCEMIA, WITHOUT LONG-TERM CURRENT USE OF INSULIN: ICD-10-CM

## 2024-10-16 RX ORDER — TIRZEPATIDE 7.5 MG/.5ML
INJECTION, SOLUTION SUBCUTANEOUS
Qty: 2 ML | Refills: 0 | Status: SHIPPED | OUTPATIENT
Start: 2024-10-16

## 2024-10-21 NOTE — PROGRESS NOTES
Chief Complaint  Diabetes (Follow up, med mgt, A1c eval)    Referred By: DELIO Gutierrez    Subjective          Katy Denise presents to [facility] for diabetes medication management    History of Present Illness    Visit type:  follow-up  Diabetes type:  Type 2  Current diabetes status/concerns/issues:  Reports she is tolerating the increased dose of Mounjaro w/o any side effects. States her fbs is running in the 120s. States her appetite in not curbed on the Mounjaro.  Other health concerns:  states she had a left shoulder sprain 2 months ago that required steroid use. States her blood sugar increased [id number].   Current Diabetes symptoms:    Polyuria: No   Polydipsia: Yes waxes and wanes   Polyphagia: Yes     Blurred vision: No   Excessive fatigue: Yes    Known Diabetes complications:  Neuropathy: Numbness and Tingling; Location: Feet  Renal: None  Eyes: No Retinopathy reported on current eye exam; Location: Bilateral; Last Eye Exam:  5/22/24 ; Location: [facility]  Amputation/Wounds:  slow to heal  GI: Diarrhea  Cardiovascular: Hyperlipidemia and Other: tachycardia-POTS  ED: None  Other: None  Hypoglycemia:  None reported at this time  Hypoglycemia Symptoms:  No hypoglycemia at this time  Current diabetes treatment:    Mounjaro 7.5mg once wk, metformin er 1000mg qd  Blood glucose device:  Meter  Blood glucose monitoring frequency:  1  Blood glucose range/average:   120's  Glucose Source: Patient Reported  Dietary behavior:  Limits high carb/sweet foods, Avoids sugary drinks, Number of meals each day - 3; Number of snacks each day - 1  Activity/Exercise:   walks 1 mile daily, swim, eliptical    Past Medical History:   Diagnosis Date    Allergic rhinitis due to allergen     Anxiety disorder     Arthritis     Chest pain     NONE CURRENT, OCC SOAE    Depression     Diabetes mellitus, type 2     Essential hypertension 06/22/2021    GERD (gastroesophageal reflux  disease)     Hyperlipidemia     Migraine     Wide-complex tachycardia 10/10/2023     Past Surgical History:   Procedure Laterality Date    CARDIAC CATHETERIZATION Left 08/30/2023    Procedure: Left Heart Cath with possible coronary angioplasty;  Surgeon: Mariam Buckner MD;  Location: [facility] CATH INVASIVE LOCATION;  Service: Cardiology;  Laterality: Left;    CARDIAC CATHETERIZATION  8/30/2023    CARPAL TUNNEL RELEASE Bilateral     COLONOSCOPY      COLONOSCOPY N/A 09/08/2022    Procedure: COLONOSCOPY with cold snare polypectomy;  Surgeon: Keshawn Yates MD;  Location: [facility] ENDOSCOPY;  Service: General;  Laterality: N/A;  colon polyp    ENDOSCOPY      HYSTERECTOMY      LUMBAR SPINE SURGERY      Microdiscectomy of thoracic or lumbar spine    TUBAL ABDOMINAL LIGATION       Family History   Problem Relation Age of Onset    Heart disease Mother     Thyroid disease Mother     Restless legs syndrome Mother     Obesity Mother     Insomnia Mother     Sleep walking Mother     Diabetes Brother     Obesity Brother     Heart disease Maternal Grandmother     Arrhythmia Maternal Grandfather     Heart attack Maternal Grandfather     Heart disease Maternal Grandfather     Stroke Other     Heart disease Other     Liver disease Other         cirrhosis    Diabetes Other     Malig Hyperthermia Neg Hx      Social History     Socioeconomic History    Marital status:    Tobacco Use    Smoking status: Former     Current packs/day: 0.00     Average packs/day: 0.3 packs/day for 15.0 years (3.8 ttl pk-yrs)     Types: Cigarettes     Start date: 1/1/1994     Quit date: 1/1/2009     Years since quitting: 15.8    Smokeless tobacco: Never    Tobacco comments:     off and on for 30 years; .23ppd   Vaping Use    Vaping status: Never Used   Substance and Sexual Activity    Alcohol use: Yes     Alcohol/week: 2.0 standard drinks of alcohol     Types: 2 Drinks containing 0.5 oz of alcohol per week     Comment: occ    Drug use: Not  "Currently    Sexual activity: Defer     Partners: Male     Birth control/protection: Vasectomy, Hysterectomy     Allergies   Allergen Reactions    Tomato Anaphylaxis    Latex Rash    Penicillins Unknown - Low Severity     \"My mother was very allergic.\"  \"My mother was very allergic.\"         Current Outpatient Medications:     Blood Glucose Monitoring Suppl device, Use as directed for blood glucose monitoring, Disp: 1 each, Rfl: 0    busPIRone (BUSPAR) 7.5 MG tablet, TAKE 1 TABLET BY MOUTH TWICE DAILY AS NEEDED FOR ANXIETY, Disp: 60 tablet, Rfl: 2    EPINEPHrine (EPIPEN) 0.3 MG/0.3ML solution auto-injector injection, Inject 0.3 mL under the skin into the appropriate area as directed 1 (One) Time., Disp: , Rfl:     escitalopram (LEXAPRO) 20 MG tablet, TAKE 1 TABLET BY MOUTH DAILY, Disp: 90 tablet, Rfl: 1    galcanezumab-gnlm (Emgality) 120 MG/ML auto-injector pen, INJECT 1 ML UNDER THE SKIN INTO THE APPROPRIATE AREA EVERY 30 DAYS AS DIRECTED, Disp: 3 mL, Rfl: 1    glucose blood test strip, Test blood glucose 1 times each day, Disp: 100 each, Rfl: 5    Lancets misc, Test blood glucose 1 times each day, Disp: 100 each, Rfl: 5    losartan (COZAAR) 25 MG tablet, TAKE 1 TABLET BY MOUTH DAILY, Disp: 90 tablet, Rfl: 1    metFORMIN ER (GLUCOPHAGE-XR) 500 MG 24 hr tablet, Take 1 tablet by mouth Daily With Breakfast for 360 days., Disp: , Rfl:     metoprolol succinate XL (TOPROL-XL) 50 MG 24 hr tablet, Take 1 tablet by mouth Daily., Disp: 90 tablet, Rfl: 3    ondansetron (ZOFRAN) 4 MG tablet, Take 1 tablet by mouth Every 8 (Eight) Hours As Needed for Nausea or Vomiting., Disp: 24 tablet, Rfl: 1    traZODone (DESYREL) 50 MG tablet, TAKE 1 TABLET BY MOUTH EVERY NIGHT, Disp: 90 tablet, Rfl: 1    rosuvastatin (Crestor) 10 MG tablet, Take 1 tablet by mouth Every Night., Disp: 90 tablet, Rfl: 0    Tirzepatide (Mounjaro) 10 MG/0.5ML solution pen-injector pen, Inject 0.5 mL under the skin into the appropriate area as directed Every 7 " "(Seven) Days for 90 days., Disp: 6 mL, Rfl: 1    Objective     Vitals:    10/22/24 1625   BP: 122/64   Pulse: 69   SpO2: 96%   Weight: 89.2 kg (196 lb 9.6 oz)   Height: 167.6 cm (66\")   PainSc: 0-No pain     Body mass index is 31.73 kg/m².    Physical Exam  Constitutional:       Appearance: Normal appearance. She is obese.      Comments: Obesity (BMI 30 - 39.9) Pt Current BMI = 31.79     HENT:      Head: Normocephalic and atraumatic.      Right Ear: External ear normal.      Left Ear: External ear normal.      Nose: Nose normal.   Eyes:      Extraocular Movements: Extraocular movements intact.      Conjunctiva/sclera: Conjunctivae normal.   Pulmonary:      Effort: Pulmonary effort is normal.   Musculoskeletal:         General: Normal range of motion.      Cervical back: Normal range of motion.   Skin:     General: Skin is warm and dry.   Neurological:      General: No focal deficit present.      Mental Status: She is alert and oriented to person, place, and time. Mental status is at baseline.   Psychiatric:         Mood and Affect: Mood normal.         Behavior: Behavior normal.         Thought Content: Thought content normal.         Judgment: Judgment normal.             Result Review :   The following data was reviewed by: DELIO Segundo on 10/22/2024:    Most Recent A1C          10/22/2024    16:33   HGBA1C Most Recent   Hemoglobin A1C 6.8        A1C Last 3 Results          5/7/2024    10:05 7/2/2024    11:53 10/22/2024    16:33   HGBA1C Last 3 Results   Hemoglobin A1C 7.80  7.9  6.8      A1c collected in the office today is 6.8%, indicating Controlled Type II diabetes.  This result is down from the prior result of 7.9% collected on 7-2-24    Glucose   Date Value Ref Range Status   10/22/2024 113 (H) 70 - 99 mg/dL Final     Comment:     Serial Number: [id number]:  [id number]     Point of care glucose in the office today is within normal limits for nonfasting glucose    Creatinine   Date Value " "Ref Range Status   08/28/2024 0.75 0.57 - 1.00 mg/dL Final   05/24/2024 0.59 0.57 - 1.00 mg/dL Final     eGFR   Date Value Ref Range Status   08/28/2024 99.0 >60.0 mL/min/1.73 Final   05/24/2024 112.0 >60.0 mL/min/1.73 Final     Labs collected on 8/28/2024 show Normal values    Microalbumin, Urine   Date Value Ref Range Status   11/21/2023 2.2 mg/dL Final   04/27/2023 1.9 mg/dL Final     No results found for: \"CREATININEUR\"  Microalbumin/Creatinine Ratio   Date Value Ref Range Status   04/20/2021 17.5 0.0 - 35.0 mg/g[Cre] Final     Urine microalbuminuria collected on 11/21/23  is negative for microalbuminuria    Total Cholesterol   Date Value Ref Range Status   05/24/2024 189 0 - 200 mg/dL Final   11/21/2023 106 0 - 200 mg/dL Final     Triglycerides   Date Value Ref Range Status   05/24/2024 472 (H) 0 - 150 mg/dL Final   11/21/2023 353 (H) 0 - 150 mg/dL Final     HDL Cholesterol   Date Value Ref Range Status   05/24/2024 45 40 - 60 mg/dL Final   11/21/2023 33 (L) 40 - 60 mg/dL Final     LDL Cholesterol    Date Value Ref Range Status   05/24/2024 71 0 - 100 mg/dL Final   11/21/2023 23 0 - 100 mg/dL Final     Lipid panel collected on 5/24/2024 shows Hypertriglyceridemia and low HDL            Assessment: Patient is here today for 2-month follow-up on her diabetes.  Last visit her Mounjaro was increased to 7.5 mg once weekly. Reports she is tolerating the increased dose of Mounjaro w/o any side effects. States her fbs is running in the 120s. States her appetite in not curbed on the Mounjaro.  Her A1c in the office today is 6.8% which is down from her previous A1c of 7.9% in July.      Diagnoses and all orders for this visit:    1. Type 2 diabetes mellitus with hemoglobin A1c goal of less than 7.0% (Primary)    2. Type 2 diabetes mellitus with hyperglycemia, without long-term current use of insulin  -     POC Glycosylated Hemoglobin (Hb A1C)  -     Tirzepatide (Mounjaro) 10 MG/0.5ML solution pen-injector pen; Inject 0.5 " mL under the skin into the appropriate area as directed Every 7 (Seven) Days for 90 days.  Dispense: 6 mL; Refill: 1  -     metFORMIN ER (GLUCOPHAGE-XR) 500 MG 24 hr tablet; Take 1 tablet by mouth Daily With Breakfast for 360 days.    3. Mixed hyperlipidemia  Comments:  stable on crestor 5mg, continue  Orders:  -     rosuvastatin (Crestor) 10 MG tablet; Take 1 tablet by mouth Every Night.  Dispense: 90 tablet; Refill: 0  -     Comprehensive Metabolic Panel; Future  -     Lipid Panel; Future    4. Obesity (BMI 30-39.9)    5. Type 2 diabetes mellitus with diabetic neuropathy, without long-term current use of insulin    Other orders  -     POC Glucose        Plan: Increased her dose of Mounjaro to 10 mg once weekly and decreased her dose of metformin from 1000 mg daily to 500 mg daily.  In review of her labs her triglycerides are 472 mg/dL.  Increased her dose of Crestor from 5 mg daily to 10 mg daily.  Ordered a CMP and lipid to be redrawn in 3 months.  Scheduled a 3-month follow-up and we will recheck her A1c at that time.    The patient will monitor her blood glucose levels once daily.  If she develops problematic hyperglycemia or hypoglycemia or adverse drug reactions, she will contact the office for further instructions.        Follow Up     Return in about 3 months (around 1/22/2025) for Medication Mgmt.    Patient was given instructions and counseling regarding her condition or for health maintenance advice. Please see specific information pulled into the AVS if appropriate.     Asuncion Barnard, APRN  10/22/2024      Dictated Utilizing Dragon Dictation.  Please note that portions of this note were completed with a voice recognition program.  Part of this note may be an electronic transcription/translation of spoken language to printed text using the Dragon Dictation System.

## 2024-10-22 ENCOUNTER — OFFICE VISIT (OUTPATIENT)
Dept: DIABETES SERVICES | Facility: HOSPITAL | Age: 49
End: 2024-10-22
Payer: COMMERCIAL

## 2024-10-22 VITALS
HEART RATE: 69 BPM | DIASTOLIC BLOOD PRESSURE: 64 MMHG | WEIGHT: 196.6 LBS | SYSTOLIC BLOOD PRESSURE: 122 MMHG | BODY MASS INDEX: 31.6 KG/M2 | HEIGHT: 66 IN | OXYGEN SATURATION: 96 %

## 2024-10-22 DIAGNOSIS — E11.9 TYPE 2 DIABETES MELLITUS WITH HEMOGLOBIN A1C GOAL OF LESS THAN 7.0%: Primary | ICD-10-CM

## 2024-10-22 DIAGNOSIS — E78.2 MIXED HYPERLIPIDEMIA: ICD-10-CM

## 2024-10-22 DIAGNOSIS — E11.65 TYPE 2 DIABETES MELLITUS WITH HYPERGLYCEMIA, WITHOUT LONG-TERM CURRENT USE OF INSULIN: ICD-10-CM

## 2024-10-22 DIAGNOSIS — E11.40 TYPE 2 DIABETES MELLITUS WITH DIABETIC NEUROPATHY, WITHOUT LONG-TERM CURRENT USE OF INSULIN: ICD-10-CM

## 2024-10-22 DIAGNOSIS — E66.9 OBESITY (BMI 30-39.9): ICD-10-CM

## 2024-10-22 LAB
EXPIRATION DATE: ABNORMAL
GLUCOSE BLDC GLUCOMTR-MCNC: 113 MG/DL (ref 70–99)
HBA1C MFR BLD: 6.8 % (ref 4.5–5.7)
Lab: ABNORMAL

## 2024-10-22 PROCEDURE — G0463 HOSPITAL OUTPT CLINIC VISIT: HCPCS | Performed by: NURSE PRACTITIONER

## 2024-10-22 PROCEDURE — 82948 REAGENT STRIP/BLOOD GLUCOSE: CPT | Performed by: NURSE PRACTITIONER

## 2024-10-22 PROCEDURE — 99214 OFFICE O/P EST MOD 30 MIN: CPT | Performed by: NURSE PRACTITIONER

## 2024-10-22 PROCEDURE — 83036 HEMOGLOBIN GLYCOSYLATED A1C: CPT | Performed by: NURSE PRACTITIONER

## 2024-10-22 RX ORDER — METFORMIN HCL 500 MG
500 TABLET, EXTENDED RELEASE 24 HR ORAL
Start: 2024-10-22 | End: 2025-10-17

## 2024-10-22 RX ORDER — EPINEPHRINE 0.3 MG/.3ML
0.3 INJECTION SUBCUTANEOUS ONCE
COMMUNITY
Start: 2024-09-23

## 2024-10-22 RX ORDER — TIRZEPATIDE 10 MG/.5ML
10 INJECTION, SOLUTION SUBCUTANEOUS
Qty: 6 ML | Refills: 1 | Status: SHIPPED | OUTPATIENT
Start: 2024-10-22 | End: 2025-01-20

## 2024-10-22 RX ORDER — ROSUVASTATIN CALCIUM 10 MG/1
10 TABLET, COATED ORAL NIGHTLY
Qty: 90 TABLET | Refills: 0 | Status: SHIPPED | OUTPATIENT
Start: 2024-10-22 | End: 2025-10-22

## 2024-10-22 NOTE — PATIENT INSTRUCTIONS
Increase Mounjaro to 10 mg once weekly    Decrease metformin from 1000 mg once daily to 500 mg once daily    Increase Crestor to 10 mg once daily.  You can finish your current 5 mg tabs by taking 2 tabs to equal 10 mg to  your new prescription

## 2024-12-18 ENCOUNTER — TELEPHONE (OUTPATIENT)
Dept: CARDIOLOGY | Facility: CLINIC | Age: 49
End: 2024-12-18
Payer: COMMERCIAL

## 2024-12-18 NOTE — TELEPHONE ENCOUNTER
[facility] received a fax that requires your attention. The document has been indexed to the patient’s chart for your review.      Reason for sending: EXTERNAL MEDICAL RECORD NOTIFICATION     Documents Description: METOPROLOL REFILL-ALONDRA-12.18.24    Name of Sender: ALONDRA     Date Indexed: 12.18.24

## 2025-01-21 ENCOUNTER — OFFICE VISIT (OUTPATIENT)
Dept: DIABETES SERVICES | Facility: HOSPITAL | Age: 50
End: 2025-01-21
Payer: COMMERCIAL

## 2025-01-21 VITALS
WEIGHT: 189.4 LBS | SYSTOLIC BLOOD PRESSURE: 120 MMHG | BODY MASS INDEX: 30.44 KG/M2 | DIASTOLIC BLOOD PRESSURE: 71 MMHG | HEART RATE: 75 BPM | OXYGEN SATURATION: 98 % | HEIGHT: 66 IN

## 2025-01-21 DIAGNOSIS — G90.A POTS (POSTURAL ORTHOSTATIC TACHYCARDIA SYNDROME): ICD-10-CM

## 2025-01-21 DIAGNOSIS — E11.65 TYPE 2 DIABETES MELLITUS WITH HYPERGLYCEMIA, WITHOUT LONG-TERM CURRENT USE OF INSULIN: ICD-10-CM

## 2025-01-21 DIAGNOSIS — E78.2 MIXED HYPERLIPIDEMIA: ICD-10-CM

## 2025-01-21 DIAGNOSIS — E11.9 TYPE 2 DIABETES MELLITUS WITH HEMOGLOBIN A1C GOAL OF LESS THAN 7.0%: Primary | ICD-10-CM

## 2025-01-21 DIAGNOSIS — E66.9 OBESITY (BMI 30-39.9): ICD-10-CM

## 2025-01-21 LAB
EXPIRATION DATE: ABNORMAL
GLUCOSE BLDC GLUCOMTR-MCNC: 170 MG/DL (ref 70–99)
HBA1C MFR BLD: 6.1 % (ref 4.5–5.7)
Lab: ABNORMAL

## 2025-01-21 PROCEDURE — 82570 ASSAY OF URINE CREATININE: CPT | Performed by: NURSE PRACTITIONER

## 2025-01-21 PROCEDURE — 83036 HEMOGLOBIN GLYCOSYLATED A1C: CPT | Performed by: NURSE PRACTITIONER

## 2025-01-21 PROCEDURE — 82948 REAGENT STRIP/BLOOD GLUCOSE: CPT | Performed by: NURSE PRACTITIONER

## 2025-01-21 PROCEDURE — 99214 OFFICE O/P EST MOD 30 MIN: CPT | Performed by: NURSE PRACTITIONER

## 2025-01-21 PROCEDURE — G0463 HOSPITAL OUTPT CLINIC VISIT: HCPCS | Performed by: NURSE PRACTITIONER

## 2025-01-21 PROCEDURE — 82043 UR ALBUMIN QUANTITATIVE: CPT | Performed by: NURSE PRACTITIONER

## 2025-01-21 RX ORDER — ROSUVASTATIN CALCIUM 10 MG/1
10 TABLET, COATED ORAL NIGHTLY
Qty: 90 TABLET | Refills: 0 | Status: SHIPPED | OUTPATIENT
Start: 2025-01-21 | End: 2026-01-21

## 2025-01-21 RX ORDER — METOPROLOL SUCCINATE 50 MG/1
50 TABLET, EXTENDED RELEASE ORAL DAILY
Qty: 90 TABLET | Refills: 1 | Status: SHIPPED | OUTPATIENT
Start: 2025-01-21

## 2025-01-21 RX ORDER — TIRZEPATIDE 12.5 MG/.5ML
12.5 INJECTION, SOLUTION SUBCUTANEOUS
Qty: 6 ML | Refills: 1 | Status: SHIPPED | OUTPATIENT
Start: 2025-01-21 | End: 2025-04-21

## 2025-01-21 NOTE — PROGRESS NOTES
Chief Complaint  Diabetes (Follow up, med mgt, A1c eval)    Referred By: DELIO Gutierrez    Patient or patient representative verbalized consent for the use of Ambient Listening during the visit with  DELIO Segundo for chart documentation. 1/21/2025  16:06 EST    Subjective          Katy Denise presents to [facility] for diabetes medication management    History of Present Illness    History of Present Illness  The patient is a 50-year-old female who presents for a follow-up on her diabetes.    She reports no adverse reactions to her current medication, Mounjaro, which she has been tolerating well. Her blood glucose levels have been fluctuating, with readings as low as 98 and as high as 150. She does not experience frequent thirst or urination, although these symptoms do occur occasionally. She also reports no episodes of hypoglycemia. She monitors her blood glucose levels daily upon waking. She is not experiencing any blurred vision or fatigue. She reports no new health issues. She is currently on metformin 500 mg once daily.    She was previously prescribed metoprolol by her cardiologist for palpitations associated with postural orthostatic tachycardia syndrome (POTS). However, she has been unable to refill this prescription due to a lack of available appointments with her cardiologist, which are currently booked for the next 3 months.    MEDICATIONS  Current: Metformin, Crestor, metoprolol, Mounjaro.         Visit type:  follow-up  Diabetes type:  Type 2  Current diabetes status/concerns/issues: Reports she is tolerating the increased dose of Mounjaro without any side effects.  Other health concerns: No new health concerns  Current Diabetes symptoms:    Polyuria: No   Polydipsia: No   Polyphagia: No   Blurred vision: No   Excessive fatigue: No  Known Diabetes complications:  Neuropathy: Numbness and Tingling; Location: Feet  Renal: None  Eyes: No Retinopathy  reported on current eye exam; Location: Bilateral; Last Eye Exam:  5/22/24 ; Location: [facility]  Amputation/Wounds:  slow to heal  GI: Diarrhea  Cardiovascular: Hyperlipidemia and Other: tachycardia-POTS  ED: None  Other: None  Hypoglycemia:  None reported at this time  Hypoglycemia Symptoms:  No hypoglycemia at this time  Current diabetes treatment:     Mounjaro 10 mg once wk, metformin er 500mg qd   Blood glucose device:  Meter  Blood glucose monitoring frequency:  1  Blood glucose range/average:   98-150mg/dl  Glucose Source: Patient Reported  Dietary behavior:  Limits high carb/sweet foods, Avoids sugary drinks, Number of meals each day - 3; Number of snacks each day - 1  Activity/Exercise:   walks 1 mile daily, swim, eliptical    Past Medical History:   Diagnosis Date    Allergic rhinitis due to allergen     Anxiety disorder     Arthritis     Chest pain     NONE CURRENT, OCC SOAE    Depression     Diabetes mellitus, type 2     Essential hypertension 06/22/2021    GERD (gastroesophageal reflux disease)     Hyperlipidemia     Migraine     Wide-complex tachycardia 10/10/2023     Past Surgical History:   Procedure Laterality Date    CARDIAC CATHETERIZATION Left 08/30/2023    Procedure: Left Heart Cath with possible coronary angioplasty;  Surgeon: Mariam Buckner MD;  Location: [facility] CATH INVASIVE LOCATION;  Service: Cardiology;  Laterality: Left;    CARDIAC CATHETERIZATION  8/30/2023    CARPAL TUNNEL RELEASE Bilateral     COLONOSCOPY      COLONOSCOPY N/A 09/08/2022    Procedure: COLONOSCOPY with cold snare polypectomy;  Surgeon: Keshawn Yates MD;  Location: [facility] END[facility];  Service: General;  Laterality: N/A;  colon polyp    ENDOSCOPY      HYSTERECTOMY      LUMBAR SPINE SURGERY      Microdiscectomy of thoracic or lumbar spine    TUBAL ABDOMINAL LIGATION       Family History   Problem Relation Age of Onset    Heart disease Mother     Thyroid disease Mother     Restless legs syndrome Mother      "Obesity Mother     Insomnia Mother     Sleep walking Mother     Diabetes Brother     Obesity Brother     Heart disease Maternal Grandmother     Arrhythmia Maternal Grandfather     Heart attack Maternal Grandfather     Heart disease Maternal Grandfather     Stroke Other     Heart disease Other     Liver disease Other         cirrhosis    Diabetes Other     Malig Hyperthermia Neg Hx      Social History     Socioeconomic History    Marital status:    Tobacco Use    Smoking status: Former     Current packs/day: 0.00     Average packs/day: 0.3 packs/day for 15.0 years (3.8 ttl pk-yrs)     Types: Cigarettes     Start date: 1994     Quit date: 2009     Years since quittin.0    Smokeless tobacco: Never    Tobacco comments:     off and on for 30 years; .23ppd   Vaping Use    Vaping status: Never Used   Substance and Sexual Activity    Alcohol use: Yes     Alcohol/week: 2.0 standard drinks of alcohol     Types: 2 Drinks containing 0.5 oz of alcohol per week     Comment: occ    Drug use: Not Currently    Sexual activity: Defer     Partners: Male     Birth control/protection: Vasectomy, Hysterectomy     Allergies   Allergen Reactions    Tomato Anaphylaxis    Latex Rash    Penicillins Unknown - Low Severity     \"My mother was very allergic.\"  \"My mother was very allergic.\"         Current Outpatient Medications:     Blood Glucose Monitoring Suppl device, Use as directed for blood glucose monitoring, Disp: 1 each, Rfl: 0    busPIRone (BUSPAR) 7.5 MG tablet, TAKE 1 TABLET BY MOUTH TWICE DAILY AS NEEDED FOR ANXIETY, Disp: 60 tablet, Rfl: 2    EPINEPHrine (EPIPEN) 0.3 MG/0.3ML solution auto-injector injection, Inject 0.3 mL under the skin into the appropriate area as directed 1 (One) Time., Disp: , Rfl:     escitalopram (LEXAPRO) 20 MG tablet, TAKE 1 TABLET BY MOUTH DAILY, Disp: 90 tablet, Rfl: 1    galcanezumab-gnlm (Emgality) 120 MG/ML auto-injector pen, INJECT 1 ML UNDER THE SKIN INTO THE APPROPRIATE AREA " "EVERY 30 DAYS AS DIRECTED, Disp: 3 mL, Rfl: 1    glucose blood test strip, Test blood glucose 1 times each day, Disp: 100 each, Rfl: 5    Lancets misc, Test blood glucose 1 times each day, Disp: 100 each, Rfl: 5    losartan (COZAAR) 25 MG tablet, TAKE 1 TABLET BY MOUTH DAILY, Disp: 90 tablet, Rfl: 1    metoprolol succinate XL (TOPROL-XL) 50 MG 24 hr tablet, Take 1 tablet by mouth Daily., Disp: 90 tablet, Rfl: 1    ondansetron (ZOFRAN) 4 MG tablet, Take 1 tablet by mouth Every 8 (Eight) Hours As Needed for Nausea or Vomiting., Disp: 24 tablet, Rfl: 1    rosuvastatin (CRESTOR) 10 MG tablet, TAKE 1 TABLET BY MOUTH EVERY NIGHT, Disp: 90 tablet, Rfl: 0    traZODone (DESYREL) 50 MG tablet, TAKE 1 TABLET BY MOUTH EVERY NIGHT, Disp: 90 tablet, Rfl: 1    Tirzepatide (Mounjaro) 12.5 MG/0.5ML solution auto-injector, Inject 0.5 mL under the skin into the appropriate area as directed Every 7 (Seven) Days for 90 days., Disp: 6 mL, Rfl: 1    Objective     Vitals:    01/21/25 1551   BP: 120/71   Pulse: 75   SpO2: 98%   Weight: 85.9 kg (189 lb 6.4 oz)   Height: 167.6 cm (66\")   PainSc: 0-No pain     Body mass index is 30.57 kg/m².    Physical Exam  Constitutional:       Appearance: Normal appearance. She is obese.      Comments: Obesity (BMI 30 - 39.9) Pt Current BMI = 30.57     HENT:      Head: Normocephalic and atraumatic.      Right Ear: External ear normal.      Left Ear: External ear normal.      Nose: Nose normal.   Eyes:      Extraocular Movements: Extraocular movements intact.      Conjunctiva/sclera: Conjunctivae normal.   Pulmonary:      Effort: Pulmonary effort is normal.   Musculoskeletal:         General: Normal range of motion.      Cervical back: Normal range of motion.   Skin:     General: Skin is warm and dry.   Neurological:      General: No focal deficit present.      Mental Status: She is alert and oriented to person, place, and time. Mental status is at baseline.   Psychiatric:         Mood and Affect: Mood " normal.         Behavior: Behavior normal.         Thought Content: Thought content normal.         Judgment: Judgment normal.             Result Review :   The following data was reviewed by: DELIO Segundo on 01/21/2025:    Most Recent A1C          1/21/2025    15:59   HGBA1C Most Recent   Hemoglobin A1C 6.1        A1C Last 3 Results          7/2/2024    11:53 10/22/2024    16:33 1/21/2025    15:59   HGBA1C Last 3 Results   Hemoglobin A1C 7.9  6.8  6.1      A1c collected in the office today is 6.1%, indicating Controlled Type II diabetes.  This result is down from the prior result of 6.8% collected on 10/22/2024    Glucose   Date Value Ref Range Status   01/21/2025 170 (H) 70 - 99 mg/dL Final     Comment:     Serial Number: [id number]:  [id number]     Point of care glucose in the office today is within normal limits for nonfasting glucose    Creatinine   Date Value Ref Range Status   08/28/2024 0.75 0.57 - 1.00 mg/dL Final   05/24/2024 0.59 0.57 - 1.00 mg/dL Final     eGFR   Date Value Ref Range Status   08/28/2024 99.0 >60.0 mL/min/1.73 Final   05/24/2024 112.0 >60.0 mL/min/1.73 Final     Labs collected on 8/28/2024 show Normal values      Total Cholesterol   Date Value Ref Range Status   05/24/2024 189 0 - 200 mg/dL Final   11/21/2023 106 0 - 200 mg/dL Final     Triglycerides   Date Value Ref Range Status   05/24/2024 472 (H) 0 - 150 mg/dL Final   11/21/2023 353 (H) 0 - 150 mg/dL Final     HDL Cholesterol   Date Value Ref Range Status   05/24/2024 45 40 - 60 mg/dL Final   11/21/2023 33 (L) 40 - 60 mg/dL Final     LDL Cholesterol    Date Value Ref Range Status   05/24/2024 71 0 - 100 mg/dL Final   11/21/2023 23 0 - 100 mg/dL Final     Lipid panel collected on 5/24/2024 shows Hypertriglyceridemia              Assessment & Plan  1. Diabetes Mellitus.  Her A1c level has shown a significant improvement, decreasing from 7.9 in July to 6.8 in October to 6.1. She has also achieved a commendable weight  loss of 7 pounds since her last visit in October 2024. Her blood sugar levels range from 98 to 170, with the highest being 150. She reports no issues with low blood sugars. The dosage of Mounjaro will be increased from 10 mg to 12.5 mg once weekly. Consequently, metformin 500 mg once daily will be discontinued.  If her blood sugar levels remain uncontrolled with the increased dose of Mounjaro, the dosage may be further increased to 15 mg.    2. Postural Orthostatic Tachycardia Syndrome (POTS).  She reports that her cardiologist can not refill her metoprolol prescription for 3 months. A refill for metoprolol has been provided to manage her symptoms until she can see her cardiologist.    3. Hyperlipidemia.  A fasting lipid panel will be ordered to reassess her triglyceride levels. She is currently on Crestor 10 mg daily, increased from 5 mg due to previous high triglyceride levels. A comprehensive metabolic panel will be ordered to monitor her kidney and liver functions, especially to ensure her liver functions have not worsened with the increased dose of Crestor.        Diagnoses and all orders for this visit:    1. Type 2 diabetes mellitus with hemoglobin A1c goal of less than 7.0% (Primary)  -     Tirzepatide (Mounjaro) 12.5 MG/0.5ML solution auto-injector; Inject 0.5 mL under the skin into the appropriate area as directed Every 7 (Seven) Days for 90 days.  Dispense: 6 mL; Refill: 1    2. Type 2 diabetes mellitus with hyperglycemia, without long-term current use of insulin  -     POC Glycosylated Hemoglobin (Hb A1C)  -     Microalbumin / Creatinine Urine Ratio - Urine, Clean Catch; Future  -     Microalbumin / Creatinine Urine Ratio - Urine, Clean Catch    3. POTS (postural orthostatic tachycardia syndrome)  -     metoprolol succinate XL (TOPROL-XL) 50 MG 24 hr tablet; Take 1 tablet by mouth Daily.  Dispense: 90 tablet; Refill: 1    4. Mixed hyperlipidemia    5. Obesity (BMI 30-39.9)    Other orders  -     POC  Glucose  -     Cancel: Microalbumin / Creatinine Urine Ratio - Urine, Clean Catch; Future            The patient will monitor her blood glucose levels 1 time daily.  If she develops problematic hyperglycemia or hypoglycemia or adverse drug reactions, she will contact the office for further instructions.        Follow Up     Return in about 3 months (around 4/21/2025) for Medication Mgmt.    Patient was given instructions and counseling regarding her condition or for health maintenance advice. Please see specific information pulled into the AVS if appropriate.     Asuncion Barnard, APRN  01/21/2025      Dictated Utilizing Dragon Dictation.  Please note that portions of this note were completed with a voice recognition program.  Part of this note may be an electronic transcription/translation of spoken language to printed text using the Dragon Dictation System.

## 2025-01-22 LAB
ALBUMIN UR-MCNC: <1.2 MG/DL
CREAT UR-MCNC: 205.6 MG/DL
MICROALBUMIN/CREAT UR: NORMAL MG/G{CREAT}

## 2025-01-30 ENCOUNTER — OFFICE VISIT (OUTPATIENT)
Dept: FAMILY MEDICINE CLINIC | Facility: CLINIC | Age: 50
End: 2025-01-30
Payer: COMMERCIAL

## 2025-01-30 ENCOUNTER — LAB (OUTPATIENT)
Dept: LAB | Facility: HOSPITAL | Age: 50
End: 2025-01-30
Payer: COMMERCIAL

## 2025-01-30 VITALS
DIASTOLIC BLOOD PRESSURE: 60 MMHG | OXYGEN SATURATION: 99 % | SYSTOLIC BLOOD PRESSURE: 112 MMHG | HEART RATE: 56 BPM | WEIGHT: 192 LBS | HEIGHT: 66 IN | BODY MASS INDEX: 30.86 KG/M2

## 2025-01-30 DIAGNOSIS — E78.5 HYPERLIPIDEMIA, UNSPECIFIED HYPERLIPIDEMIA TYPE: ICD-10-CM

## 2025-01-30 DIAGNOSIS — I10 PRIMARY HYPERTENSION: ICD-10-CM

## 2025-01-30 DIAGNOSIS — E11.9 TYPE 2 DIABETES MELLITUS WITHOUT COMPLICATION, WITHOUT LONG-TERM CURRENT USE OF INSULIN: ICD-10-CM

## 2025-01-30 DIAGNOSIS — F51.01 PRIMARY INSOMNIA: ICD-10-CM

## 2025-01-30 DIAGNOSIS — F33.0 MAJOR DEPRESSIVE DISORDER, RECURRENT, MILD: ICD-10-CM

## 2025-01-30 DIAGNOSIS — Z13.29 SCREENING FOR THYROID DISORDER: ICD-10-CM

## 2025-01-30 DIAGNOSIS — F41.9 ANXIETY: Primary | ICD-10-CM

## 2025-01-30 DIAGNOSIS — G43.001 MIGRAINE WITHOUT AURA AND WITH STATUS MIGRAINOSUS, NOT INTRACTABLE: ICD-10-CM

## 2025-01-30 DIAGNOSIS — E78.2 MIXED HYPERLIPIDEMIA: ICD-10-CM

## 2025-01-30 LAB
ALBUMIN SERPL-MCNC: 4.3 G/DL (ref 3.5–5.2)
ALBUMIN/GLOB SERPL: 1.4 G/DL
ALP SERPL-CCNC: 46 U/L (ref 39–117)
ALT SERPL W P-5'-P-CCNC: 42 U/L (ref 1–33)
ANION GAP SERPL CALCULATED.3IONS-SCNC: 9.6 MMOL/L (ref 5–15)
AST SERPL-CCNC: 28 U/L (ref 1–32)
BASOPHILS # BLD AUTO: 0.05 10*3/MM3 (ref 0–0.2)
BASOPHILS NFR BLD AUTO: 0.9 % (ref 0–1.5)
BILIRUB SERPL-MCNC: 0.4 MG/DL (ref 0–1.2)
BUN SERPL-MCNC: 9 MG/DL (ref 6–20)
BUN/CREAT SERPL: 10.8 (ref 7–25)
CALCIUM SPEC-SCNC: 9.6 MG/DL (ref 8.6–10.5)
CHLORIDE SERPL-SCNC: 105 MMOL/L (ref 98–107)
CHOLEST SERPL-MCNC: 113 MG/DL (ref 0–200)
CO2 SERPL-SCNC: 26.4 MMOL/L (ref 22–29)
CREAT SERPL-MCNC: 0.83 MG/DL (ref 0.57–1)
DEPRECATED RDW RBC AUTO: 43.2 FL (ref 37–54)
EGFRCR SERPLBLD CKD-EPI 2021: 87.1 ML/MIN/1.73
EOSINOPHIL # BLD AUTO: 0.11 10*3/MM3 (ref 0–0.4)
EOSINOPHIL NFR BLD AUTO: 2 % (ref 0.3–6.2)
ERYTHROCYTE [DISTWIDTH] IN BLOOD BY AUTOMATED COUNT: 12.4 % (ref 12.3–15.4)
GLOBULIN UR ELPH-MCNC: 3.1 GM/DL
GLUCOSE SERPL-MCNC: 158 MG/DL (ref 65–99)
HCT VFR BLD AUTO: 41.7 % (ref 34–46.6)
HDLC SERPL-MCNC: 46 MG/DL (ref 40–60)
HGB BLD-MCNC: 13.6 G/DL (ref 12–15.9)
IMM GRANULOCYTES # BLD AUTO: 0.01 10*3/MM3 (ref 0–0.05)
IMM GRANULOCYTES NFR BLD AUTO: 0.2 % (ref 0–0.5)
LDLC SERPL CALC-MCNC: 48 MG/DL (ref 0–100)
LDLC/HDLC SERPL: 1.03 {RATIO}
LYMPHOCYTES # BLD AUTO: 2.41 10*3/MM3 (ref 0.7–3.1)
LYMPHOCYTES NFR BLD AUTO: 44.4 % (ref 19.6–45.3)
MCH RBC QN AUTO: 31 PG (ref 26.6–33)
MCHC RBC AUTO-ENTMCNC: 32.6 G/DL (ref 31.5–35.7)
MCV RBC AUTO: 95 FL (ref 79–97)
MONOCYTES # BLD AUTO: 0.68 10*3/MM3 (ref 0.1–0.9)
MONOCYTES NFR BLD AUTO: 12.5 % (ref 5–12)
NEUTROPHILS NFR BLD AUTO: 2.17 10*3/MM3 (ref 1.7–7)
NEUTROPHILS NFR BLD AUTO: 40 % (ref 42.7–76)
NRBC BLD AUTO-RTO: 0 /100 WBC (ref 0–0.2)
PLATELET # BLD AUTO: 240 10*3/MM3 (ref 140–450)
PMV BLD AUTO: 12 FL (ref 6–12)
POTASSIUM SERPL-SCNC: 3.8 MMOL/L (ref 3.5–5.2)
PROT SERPL-MCNC: 7.4 G/DL (ref 6–8.5)
RBC # BLD AUTO: 4.39 10*6/MM3 (ref 3.77–5.28)
SODIUM SERPL-SCNC: 141 MMOL/L (ref 136–145)
TRIGL SERPL-MCNC: 98 MG/DL (ref 0–150)
TSH SERPL DL<=0.05 MIU/L-ACNC: 1.96 UIU/ML (ref 0.27–4.2)
VLDLC SERPL-MCNC: 19 MG/DL (ref 5–40)
WBC NRBC COR # BLD AUTO: 5.43 10*3/MM3 (ref 3.4–10.8)

## 2025-01-30 PROCEDURE — 99214 OFFICE O/P EST MOD 30 MIN: CPT | Performed by: NURSE PRACTITIONER

## 2025-01-30 PROCEDURE — 80061 LIPID PANEL: CPT

## 2025-01-30 PROCEDURE — 36415 COLL VENOUS BLD VENIPUNCTURE: CPT

## 2025-01-30 PROCEDURE — 80050 GENERAL HEALTH PANEL: CPT

## 2025-01-30 NOTE — PROGRESS NOTES
"Chief Complaint  Diabetes, Anxiety, Depression, Hypertension, Hyperlipidemia, and Insomnia (/), migraines    Subjective            Katy Denise presents to [facility]  History of Present Illness  Pt is a f/u for DM2, anxiety, depression, HTN, HLD, migraines and insomnia. No issues or concerns at this time.     Pt is due labs/orders placed.    Pt is due flu and hep b vaccines. Pt states she had hep B vaccines at the age of 20. Pt declines flu and understands the risks of not having.     Pt is followed by Asuncion Barnard with diabetes care.         Past Medical History:   Diagnosis Date    Allergic rhinitis due to allergen     Anxiety disorder     Arthritis     Chest pain     NONE CURRENT, OCC SOAE    Depression     Diabetes mellitus, type 2     Essential hypertension 06/22/2021    GERD (gastroesophageal reflux disease)     Hyperlipidemia     Migraine     Wide-complex tachycardia 10/10/2023       Allergies   Allergen Reactions    Tomato Anaphylaxis    Latex Rash    Penicillins Unknown - Low Severity     \"My mother was very allergic.\"  \"My mother was very allergic.\"          Past Surgical History:   Procedure Laterality Date    CARDIAC CATHETERIZATION Left 08/30/2023    Procedure: Left Heart Cath with possible coronary angioplasty;  Surgeon: Mariam Buckner MD;  Location: [facility] CATH INVASIVE LOCATION;  Service: Cardiology;  Laterality: Left;    CARDIAC CATHETERIZATION  8/30/2023    CARPAL TUNNEL RELEASE Bilateral     COLONOSCOPY      COLONOSCOPY N/A 09/08/2022    Procedure: COLONOSCOPY with cold snare polypectomy;  Surgeon: Keshawn Yates MD;  Location: [facility] ENDOSCOPY;  Service: General;  Laterality: N/A;  colon polyp    ENDOSCOPY      HYSTERECTOMY      LUMBAR SPINE SURGERY      Microdiscectomy of thoracic or lumbar spine    TUBAL ABDOMINAL LIGATION          Social History     Tobacco Use    Smoking status: Former     Current packs/day: 0.00     Average packs/day: 0.3 " packs/day for 15.0 years (3.8 ttl pk-yrs)     Types: Cigarettes     Start date: 1994     Quit date: 2009     Years since quittin.0    Smokeless tobacco: Never    Tobacco comments:     off and on for 30 years; .23ppd   Substance Use Topics    Alcohol use: Yes     Alcohol/week: 2.0 standard drinks of alcohol     Types: 2 Drinks containing 0.5 oz of alcohol per week     Comment: occ       Family History   Problem Relation Age of Onset    Heart disease Mother     Thyroid disease Mother     Restless legs syndrome Mother     Obesity Mother     Insomnia Mother     Sleep walking Mother     Diabetes Brother     Obesity Brother     Heart disease Maternal Grandmother     Arrhythmia Maternal Grandfather     Heart attack Maternal Grandfather     Heart disease Maternal Grandfather     Stroke Other     Heart disease Other     Liver disease Other         cirrhosis    Diabetes Other     Malig Hyperthermia Neg Hx         Current Outpatient Medications on File Prior to Visit   Medication Sig    Blood Glucose Monitoring Suppl device Use as directed for blood glucose monitoring    busPIRone (BUSPAR) 7.5 MG tablet TAKE 1 TABLET BY MOUTH TWICE DAILY AS NEEDED FOR ANXIETY    EPINEPHrine (EPIPEN) 0.3 MG/0.3ML solution auto-injector injection Inject 0.3 mL under the skin into the appropriate area as directed 1 (One) Time.    escitalopram (LEXAPRO) 20 MG tablet TAKE 1 TABLET BY MOUTH DAILY    galcanezumab-gnlm (Emgality) 120 MG/ML auto-injector pen INJECT 1 ML UNDER THE SKIN INTO THE APPROPRIATE AREA EVERY 30 DAYS AS DIRECTED    glucose blood test strip Test blood glucose 1 times each day    Lancets misc Test blood glucose 1 times each day    losartan (COZAAR) 25 MG tablet TAKE 1 TABLET BY MOUTH DAILY    metoprolol succinate XL (TOPROL-XL) 50 MG 24 hr tablet Take 1 tablet by mouth Daily.    ondansetron (ZOFRAN) 4 MG tablet Take 1 tablet by mouth Every 8 (Eight) Hours As Needed for Nausea or Vomiting.    rosuvastatin (CRESTOR) 10  "MG tablet TAKE 1 TABLET BY MOUTH EVERY NIGHT    Tirzepatide (Mounjaro) 12.5 MG/0.5ML solution auto-injector Inject 0.5 mL under the skin into the appropriate area as directed Every 7 (Seven) Days for 90 days.    traZODone (DESYREL) 50 MG tablet TAKE 1 TABLET BY MOUTH EVERY NIGHT     No current facility-administered medications on file prior to visit.       There are no preventive care reminders to display for this patient.      Objective     /60   Pulse 56   Ht 167.6 cm (66\")   Wt 87.1 kg (192 lb)   SpO2 99%   BMI 30.99 kg/m²       Physical Exam  Constitutional:       General: She is not in acute distress.     Appearance: Normal appearance. She is not ill-appearing.   HENT:      Head: Normocephalic and atraumatic.   Cardiovascular:      Rate and Rhythm: Normal rate and regular rhythm.      Heart sounds: Normal heart sounds. No murmur heard.  Pulmonary:      Effort: Pulmonary effort is normal. No respiratory distress.      Breath sounds: Normal breath sounds.   Chest:      Chest wall: No tenderness.   Musculoskeletal:         General: No swelling or tenderness. Normal range of motion.      Cervical back: Normal range of motion and neck supple.   Skin:     General: Skin is warm and dry.      Findings: No rash.   Neurological:      General: No focal deficit present.      Mental Status: She is alert and oriented to person, place, and time. Mental status is at baseline.      Gait: Gait normal.   Psychiatric:         Mood and Affect: Mood normal.         Behavior: Behavior normal.         Thought Content: Thought content normal.         Judgment: Judgment normal.           Result Review :                           Assessment and Plan        Diagnoses and all orders for this visit:    1. Anxiety (Primary)  Comments:  stableon Buspar 7.5mg prrn and Lexapro 20mg, continue    2. Major depressive disorder, recurrent, mild  Comments:  stable on Lexapro 20mg, continue    3. Primary hypertension  Comments:  stable on " Cozaar 25mg and Toprol 50mg, continue  Orders:  -     CBC w AUTO Differential; Future    4. Hyperlipidemia, unspecified hyperlipidemia type  Comments:  stable on Crestor 10mg, continue  Orders:  -     Comprehensive metabolic panel; Future  -     Lipid panel; Future    5. Type 2 diabetes mellitus without complication, without long-term current use of insulin  Comments:  stable on Mounjar 12.5mg, continue, continue f/u with DWAINE Barnard for mgmt    6. Screening for thyroid disorder  -     TSH; Future    7. Primary insomnia  Comments:  stable on Trazodone 50mg, continue    8. Migraine without aura and with status migrainosus, not intractable  Comments:  stableon Emglality 120mg q month, continue              Follow Up     Return in about 6 months (around 7/30/2025).    Patient was given instructions and counseling regarding her condition or for health maintenance advice. Please see specific information pulled into the AVS if appropriate.     Katy Denise  reports that she quit smoking about 16 years ago. Her smoking use included cigarettes. She started smoking about 31 years ago. She has a 3.8 pack-year smoking history. She has never used smokeless tobacco.

## 2025-03-18 DIAGNOSIS — F33.0 MILD EPISODE OF RECURRENT MAJOR DEPRESSIVE DISORDER: ICD-10-CM

## 2025-03-18 DIAGNOSIS — F41.9 ANXIETY DISORDER, UNSPECIFIED TYPE: ICD-10-CM

## 2025-03-18 DIAGNOSIS — I10 ESSENTIAL HYPERTENSION: ICD-10-CM

## 2025-03-18 RX ORDER — ESCITALOPRAM OXALATE 20 MG/1
20 TABLET ORAL DAILY
Qty: 90 TABLET | Refills: 1 | Status: SHIPPED | OUTPATIENT
Start: 2025-03-18

## 2025-03-18 RX ORDER — LOSARTAN POTASSIUM 25 MG/1
25 TABLET ORAL DAILY
Qty: 90 TABLET | Refills: 1 | Status: SHIPPED | OUTPATIENT
Start: 2025-03-18

## 2025-03-24 DIAGNOSIS — F51.01 PRIMARY INSOMNIA: ICD-10-CM

## 2025-03-24 RX ORDER — TRAZODONE HYDROCHLORIDE 50 MG/1
50 TABLET ORAL NIGHTLY
Qty: 90 TABLET | Refills: 1 | Status: SHIPPED | OUTPATIENT
Start: 2025-03-24

## 2025-03-26 DIAGNOSIS — R11.0 NAUSEA: ICD-10-CM

## 2025-03-26 RX ORDER — ONDANSETRON 4 MG/1
4 TABLET, FILM COATED ORAL EVERY 8 HOURS PRN
Qty: 24 TABLET | Refills: 1 | Status: SHIPPED | OUTPATIENT
Start: 2025-03-26

## 2025-04-14 DIAGNOSIS — E78.2 MIXED HYPERLIPIDEMIA: ICD-10-CM

## 2025-04-15 RX ORDER — ROSUVASTATIN CALCIUM 10 MG/1
10 TABLET, COATED ORAL NIGHTLY
Qty: 90 TABLET | Refills: 1 | Status: SHIPPED | OUTPATIENT
Start: 2025-04-15 | End: 2026-04-15

## 2025-04-15 NOTE — TELEPHONE ENCOUNTER
Last OV 01/21/2025    Next scheduled appt 04/22/2025    Requesting refill of Rosuvastatin 10mg    Last Filled by Asuncion 01/21/2025 #90.      But should the patient get script from PCP DELIO Gutierrez??

## 2025-04-21 NOTE — PROGRESS NOTES
Chief Complaint  Diabetes (Follow up, med mgt A1c eval)    Referred By: DELIO Gutierrez    Patient or patient representative verbalized consent for the use of Ambient Listening during the visit with  DELIO Segundo for chart documentation. 4/22/2025  16:01 EDT    Subjective          Katy Denise presents to [facility] for diabetes medication management    History of Present Illness    History of Present Illness  The patient presents for follow-up on her diabetes.    A weight loss of 11 pounds since the last visit is reported, attributed to the initiation of Mounjaro 12.5 mg. No adverse effects from the increased dosage are noted. There are no symptoms of polydipsia or polyuria, and appetite is suppressed. No blurred vision or fatigue is reported, although general tiredness due to lifestyle is acknowledged. No episodes of hypoglycemia have been observed. Blood glucose levels typically range from 90s to 120s, with morning readings consistently between 95 and 110. An elevated reading today is anticipated due to recent consumption of cake and Mountain Dew, a deviation from the usual diet of Mountain Zero, attributed to stress. Currently, a weekly regimen of Mounjaro is being followed for diabetes management, with metformin 500 mg daily having been discontinued. Mounjaro is perceived as more effective than Ozempic, which was used for several years without significant benefit.    A white discoloration on one of the big toenails, first noticed approximately 2 weeks ago, is reported. Uncertainty exists regarding whether this is indicative of a fungal infection or related to trauma. A  advised seeking medical evaluation before proceeding with a pedicure.         Visit type:  follow-up  Diabetes type:  Type 2  Current diabetes status/concerns/issues: Last visit her Mounjaro was increased from 10 mg to 12 mg once weekly.  She has lost 11 pound since her last visit.   She reports she is tolerating the medication well without any side effects.  Other health concerns: No new health concerns  Current Diabetes symptoms:    Polyuria: No   Polydipsia: No   Polyphagia: No   Blurred vision: No   Excessive fatigue: Yes    Known Diabetes complications:  Neuropathy: Numbness and Tingling; Location: Feet  Renal: None  Eyes: No Retinopathy reported on current eye exam; Location: Bilateral; Last Eye Exam:  5/22/24 ; Location: [facility]  Amputation/Wounds:  slow to heal  GI: Diarrhea  Cardiovascular: Hyperlipidemia and Other: tachycardia-POTS  ED: None  Other: None  Hypoglycemia:  None reported at this time  Hypoglycemia Symptoms:  No hypoglycemia at this time  Current diabetes treatment:    Mounjaro 12.5 mg once wk,  Blood glucose device:  Meter  Blood glucose monitoring frequency:  1  Blood glucose range/average:    mg  Glucose Source: Patient Reported  Dietary behavior:  Limits high carb/sweet foods, Avoids sugary drinks, Number of meals each day - 3; Number of snacks each day - 1  Activity/Exercise:   walks 1 mile daily, swim, eliptical    Past Medical History:   Diagnosis Date    Allergic rhinitis due to allergen     Anxiety disorder     Arthritis     Chest pain     NONE CURRENT, OCC SOAE    Depression     Diabetes mellitus, type 2     Essential hypertension 06/22/2021    GERD (gastroesophageal reflux disease)     Hyperlipidemia     Migraine     Wide-complex tachycardia 10/10/2023     Past Surgical History:   Procedure Laterality Date    CARDIAC CATHETERIZATION Left 08/30/2023    Procedure: Left Heart Cath with possible coronary angioplasty;  Surgeon: Mariam Buckner MD;  Location: [facility] CATH INVASIVE LOCATION;  Service: Cardiology;  Laterality: Left;    CARDIAC CATHETERIZATION  8/30/2023    CARPAL TUNNEL RELEASE Bilateral     COLONOSCOPY      COLONOSCOPY N/A 09/08/2022    Procedure: COLONOSCOPY with cold snare polypectomy;  Surgeon: Keshawn Yates MD;  Location:   "[facility];  Service: General;  Laterality: N/A;  colon polyp    ENDOSCOPY      HYSTERECTOMY      LUMBAR SPINE SURGERY      Microdiscectomy of thoracic or lumbar spine    TUBAL ABDOMINAL LIGATION       Family History   Problem Relation Age of Onset    Heart disease Mother     Thyroid disease Mother     Restless legs syndrome Mother     Obesity Mother     Insomnia Mother     Sleep walking Mother     Diabetes Brother     Obesity Brother     Heart disease Maternal Grandmother     Arrhythmia Maternal Grandfather     Heart attack Maternal Grandfather     Heart disease Maternal Grandfather     Stroke Other     Heart disease Other     Liver disease Other         cirrhosis    Diabetes Other     Malig Hyperthermia Neg Hx      Social History     Socioeconomic History    Marital status:    Tobacco Use    Smoking status: Former     Current packs/day: 0.00     Average packs/day: 0.3 packs/day for 15.0 years (3.8 ttl pk-yrs)     Types: Cigarettes     Start date: 1994     Quit date: 2009     Years since quittin.3    Smokeless tobacco: Never    Tobacco comments:     off and on for 30 years; .23ppd   Vaping Use    Vaping status: Never Used   Substance and Sexual Activity    Alcohol use: Yes     Alcohol/week: 2.0 standard drinks of alcohol     Types: 2 Drinks containing 0.5 oz of alcohol per week     Comment: occ    Drug use: Not Currently    Sexual activity: Defer     Partners: Male     Birth control/protection: Vasectomy, Hysterectomy     Allergies   Allergen Reactions    Tomato Anaphylaxis    Latex Rash    Penicillins Unknown - Low Severity     \"My mother was very allergic.\"  \"My mother was very allergic.\"         Current Outpatient Medications:     Blood Glucose Monitoring Suppl device, Use as directed for blood glucose monitoring, Disp: 1 each, Rfl: 0    busPIRone (BUSPAR) 7.5 MG tablet, TAKE 1 TABLET BY MOUTH TWICE DAILY AS NEEDED FOR ANXIETY, Disp: 60 tablet, Rfl: 2    EPINEPHrine (EPIPEN) 0.3 MG/0.3ML " "solution auto-injector injection, Inject 0.3 mL under the skin into the appropriate area as directed 1 (One) Time., Disp: , Rfl:     escitalopram (LEXAPRO) 20 MG tablet, TAKE 1 TABLET BY MOUTH DAILY, Disp: 90 tablet, Rfl: 1    galcanezumab-gnlm (Emgality) 120 MG/ML auto-injector pen, INJECT 1 ML UNDER THE SKIN INTO THE APPROPRIATE AREA EVERY 30 DAYS AS DIRECTED, Disp: 3 mL, Rfl: 1    glucose blood test strip, Test blood glucose 1 times each day, Disp: 100 each, Rfl: 5    Lancets misc, Test blood glucose 1 times each day, Disp: 100 each, Rfl: 5    losartan (COZAAR) 25 MG tablet, TAKE 1 TABLET BY MOUTH DAILY, Disp: 90 tablet, Rfl: 1    metoprolol succinate XL (TOPROL-XL) 50 MG 24 hr tablet, Take 1 tablet by mouth Daily., Disp: 90 tablet, Rfl: 1    ondansetron (ZOFRAN) 4 MG tablet, TAKE 1 TABLET BY MOUTH EVERY 8 HOURS AS NEEDED FOR NAUSEA OR VOMITING, Disp: 24 tablet, Rfl: 1    rosuvastatin (CRESTOR) 10 MG tablet, TAKE 1 TABLET BY MOUTH EVERY NIGHT, Disp: 90 tablet, Rfl: 1    Tirzepatide (Mounjaro) 12.5 MG/0.5ML solution auto-injector, Inject 0.5 mL under the skin into the appropriate area as directed Every 7 (Seven) Days for 90 days., Disp: 6 mL, Rfl: 1    traZODone (DESYREL) 50 MG tablet, TAKE 1 TABLET BY MOUTH EVERY NIGHT, Disp: 90 tablet, Rfl: 1    Objective     Vitals:    04/22/25 1547   BP: 99/61   Pulse: 88   SpO2: 97%   Weight: 80.7 kg (178 lb)   Height: 167.6 cm (66\")   PainSc: 0-No pain     Body mass index is 28.73 kg/m².    Physical Exam  Constitutional:       Appearance: Normal appearance. She is normal weight.      Comments: Overweight (BMI 25 - 29.9) Pt Current BMI = 28.73     HENT:      Head: Normocephalic and atraumatic.      Right Ear: External ear normal.      Left Ear: External ear normal.      Nose: Nose normal.   Eyes:      Extraocular Movements: Extraocular movements intact.      Conjunctiva/sclera: Conjunctivae normal.   Pulmonary:      Effort: Pulmonary effort is normal.   Musculoskeletal:       "   General: Normal range of motion.      Cervical back: Normal range of motion.   Skin:     General: Skin is warm and dry.   Neurological:      General: No focal deficit present.      Mental Status: She is alert and oriented to person, place, and time. Mental status is at baseline.   Psychiatric:         Mood and Affect: Mood normal.         Behavior: Behavior normal.         Thought Content: Thought content normal.         Judgment: Judgment normal.             Result Review :   The following data was reviewed by: DELIO Segundo on 04/22/2025:    Most Recent A1C          4/22/2025    15:53   HGBA1C Most Recent   Hemoglobin A1C 5.8        A1C Last 3 Results          10/22/2024    16:33 1/21/2025    15:59 4/22/2025    15:53   HGBA1C Last 3 Results   Hemoglobin A1C 6.8  6.1  5.8      A1c collected in the office today is 5.8%, indicating Controlled Type II diabetes.  This result is down from the prior result of 6.1% collected on 1/21/25.     Glucose   Date Value Ref Range Status   04/22/2025 173 (H) 70 - 99 mg/dL Final     Comment:     Serial Number: [id number]:  [id number]     Point of care glucose in the office today is within normal limits for nonfasting glucose    Creatinine   Date Value Ref Range Status   01/30/2025 0.83 0.57 - 1.00 mg/dL Final   08/28/2024 0.75 0.57 - 1.00 mg/dL Final     eGFR   Date Value Ref Range Status   01/30/2025 87.1 >60.0 mL/min/1.73 Final   08/28/2024 99.0 >60.0 mL/min/1.73 Final     Labs collected on 1/30/2025 show Stage II mild (GFR = 60-89mL/min)    Microalbumin, Urine   Date Value Ref Range Status   01/21/2025 <1.2 mg/dL Final   11/21/2023 2.2 mg/dL Final     Creatinine, Urine   Date Value Ref Range Status   01/21/2025 205.6 mg/dL Final     Microalbumin/Creatinine Ratio   Date Value Ref Range Status   01/21/2025   Final     Comment:     Unable to calculate   04/20/2021 17.5 0.0 - 35.0 mg/g[Cre] Final     Urine microalbuminuria collected on 1/21/2025 is negative for  microalbuminuria    Total Cholesterol   Date Value Ref Range Status   01/30/2025 113 0 - 200 mg/dL Final   05/24/2024 189 0 - 200 mg/dL Final     Triglycerides   Date Value Ref Range Status   01/30/2025 98 0 - 150 mg/dL Final   05/24/2024 472 (H) 0 - 150 mg/dL Final     HDL Cholesterol   Date Value Ref Range Status   01/30/2025 46 40 - 60 mg/dL Final   05/24/2024 45 40 - 60 mg/dL Final     LDL Cholesterol    Date Value Ref Range Status   01/30/2025 48 0 - 100 mg/dL Final   05/24/2024 71 0 - 100 mg/dL Final     Lipid panel collected on 1/30/2025 shows normal lipid panel              Diagnoses and all orders for this visit:    1. Type 2 diabetes mellitus with hemoglobin A1c goal of less than 7.0% (Primary)  -     POC Glycosylated Hemoglobin (Hb A1C)  -     Tirzepatide (Mounjaro) 12.5 MG/0.5ML solution auto-injector; Inject 0.5 mL under the skin into the appropriate area as directed Every 7 (Seven) Days for 90 days.  Dispense: 6 mL; Refill: 1    2. Type 2 diabetes mellitus with diabetic neuropathy, without long-term current use of insulin    3. Overweight (BMI 25.0-29.9)    4. Controlled type 2 diabetes mellitus with hyperglycemia, without long-term current use of insulin    Other orders  -     POC Glucose  -     POC Glucose          Assessment & Plan  1. Diabetes Mellitus.  - Diabetes is well-managed with the current regimen of Mounjaro 12.5 mg once a week.  - Weight loss of 11 pounds since the last visit; A1c improved from 6.8 in 10/2024 to 5.8 today.  - Blood sugar levels consistently between 95 and 110; no issues with frequent thirst or urination.  - Prescription refill for Mounjaro sent to [facility] at [place] [id number]; continue daily blood sugar monitoring and report any significant changes.    2. White discoloration on left border of left great toenail.  - White discoloration on toenail does not appear to be fungal in nature.  - Likely due to trauma; toenail is not thickened as typically seen in fungal  infections.  - Advised to monitor the toenail for any changes; if discoloration does not grow out with regular toenail growth, inform the clinic.  - Toenail examination reveals no signs of fungal infection; continue observation.      The patient will monitor her blood glucose levels 1 time daily.  If she develops problematic hyperglycemia or hypoglycemia or adverse drug reactions, she will contact the office for further instructions.        Follow Up     Return in about 3 months (around 7/22/2025) for Medication Mgmt.    Patient was given instructions and counseling regarding her condition or for health maintenance advice. Please see specific information pulled into the AVS if appropriate.     Asuncion Barnard, APRN  04/22/2025      Dictated Utilizing Dragon Dictation.  Please note that portions of this note were completed with a voice recognition program.  Part of this note may be an electronic transcription/translation of spoken language to printed text using the Dragon Dictation System.

## 2025-04-22 ENCOUNTER — OFFICE VISIT (OUTPATIENT)
Dept: DIABETES SERVICES | Facility: HOSPITAL | Age: 50
End: 2025-04-22
Payer: COMMERCIAL

## 2025-04-22 VITALS
WEIGHT: 178 LBS | HEART RATE: 88 BPM | DIASTOLIC BLOOD PRESSURE: 61 MMHG | BODY MASS INDEX: 28.61 KG/M2 | OXYGEN SATURATION: 97 % | SYSTOLIC BLOOD PRESSURE: 99 MMHG | HEIGHT: 66 IN

## 2025-04-22 DIAGNOSIS — E66.3 OVERWEIGHT (BMI 25.0-29.9): ICD-10-CM

## 2025-04-22 DIAGNOSIS — E11.40 TYPE 2 DIABETES MELLITUS WITH DIABETIC NEUROPATHY, WITHOUT LONG-TERM CURRENT USE OF INSULIN: ICD-10-CM

## 2025-04-22 DIAGNOSIS — E11.65 CONTROLLED TYPE 2 DIABETES MELLITUS WITH HYPERGLYCEMIA, WITHOUT LONG-TERM CURRENT USE OF INSULIN: ICD-10-CM

## 2025-04-22 DIAGNOSIS — E11.9 TYPE 2 DIABETES MELLITUS WITH HEMOGLOBIN A1C GOAL OF LESS THAN 7.0%: Primary | ICD-10-CM

## 2025-04-22 LAB
EXPIRATION DATE: ABNORMAL
GLUCOSE BLDC GLUCOMTR-MCNC: 153 MG/DL (ref 70–99)
HBA1C MFR BLD: 5.8 % (ref 4.5–5.7)
Lab: ABNORMAL

## 2025-04-22 PROCEDURE — 83036 HEMOGLOBIN GLYCOSYLATED A1C: CPT | Performed by: NURSE PRACTITIONER

## 2025-04-22 PROCEDURE — G0463 HOSPITAL OUTPT CLINIC VISIT: HCPCS | Performed by: NURSE PRACTITIONER

## 2025-04-22 PROCEDURE — 82948 REAGENT STRIP/BLOOD GLUCOSE: CPT | Performed by: NURSE PRACTITIONER

## 2025-04-22 PROCEDURE — 99214 OFFICE O/P EST MOD 30 MIN: CPT | Performed by: NURSE PRACTITIONER

## 2025-04-22 RX ORDER — TIRZEPATIDE 12.5 MG/.5ML
12.5 INJECTION, SOLUTION SUBCUTANEOUS
Qty: 6 ML | Refills: 1 | Status: SHIPPED | OUTPATIENT
Start: 2025-04-22 | End: 2025-07-21

## 2025-04-24 LAB — GLUCOSE BLDC GLUCOMTR-MCNC: NORMAL MG/DL

## 2025-05-29 ENCOUNTER — OFFICE VISIT (OUTPATIENT)
Dept: ORTHOPEDIC SURGERY | Facility: CLINIC | Age: 50
End: 2025-05-29
Payer: COMMERCIAL

## 2025-05-29 VITALS
WEIGHT: 177.8 LBS | HEIGHT: 66 IN | BODY MASS INDEX: 28.57 KG/M2 | DIASTOLIC BLOOD PRESSURE: 61 MMHG | SYSTOLIC BLOOD PRESSURE: 105 MMHG | OXYGEN SATURATION: 96 % | HEART RATE: 68 BPM

## 2025-05-29 DIAGNOSIS — M25.562 LEFT KNEE PAIN, UNSPECIFIED CHRONICITY: Primary | ICD-10-CM

## 2025-05-29 DIAGNOSIS — M22.2X2 PATELLOFEMORAL PAIN SYNDROME OF LEFT KNEE: ICD-10-CM

## 2025-05-29 NOTE — PROGRESS NOTES
"Chief Complaint  Initial Evaluation of the Left Knee     Subjective      Katy Denise presents to [facility] for initial evaluation of the left knee. She has had pain in the left knee for awhile.  She has pain all the time.  She notes stiffness.  She cannot kneel on the knee, has pain with prolonged standing, sitting and all the time.  The pain has increased the last couple of months.  The pain is around the patella.      Allergies   Allergen Reactions    Tomato Anaphylaxis    Latex Rash    Penicillins Unknown - Low Severity     \"My mother was very allergic.\"  \"My mother was very allergic.\"          Social History     Socioeconomic History    Marital status:    Tobacco Use    Smoking status: Former     Current packs/day: 0.00     Average packs/day: 0.3 packs/day for 15.0 years (3.8 ttl pk-yrs)     Types: Cigarettes     Start date: 1994     Quit date: 2009     Years since quittin.4    Smokeless tobacco: Never    Tobacco comments:     off and on for 30 years; .23ppd   Vaping Use    Vaping status: Never Used   Substance and Sexual Activity    Alcohol use: Yes     Alcohol/week: 2.0 standard drinks of alcohol     Types: 2 Drinks containing 0.5 oz of alcohol per week     Comment: occ    Drug use: Not Currently    Sexual activity: Defer     Partners: Male     Birth control/protection: Vasectomy, Hysterectomy        I reviewed the patient's chief complaint, history of present illness, review of systems, past medical history, surgical history, family history, social history, medications, and allergy list.     Review of Systems     Constitutional: Denies fevers, chills, weight loss  Cardiovascular: Denies chest pain, shortness of breath  Skin: Denies rashes, acute skin changes  Neurologic: Denies headache, loss of consciousness        Vital Signs:   /61   Pulse 68   Ht 167.6 cm (66\")   Wt 80.6 kg (177 lb 12.8 oz)   SpO2 96%   BMI 28.70 kg/m²          Physical " Exam  General: Alert. No acute distress    Ortho Exam        LEFT KNEE Flexion 120. Extension 0. Stable to varus/valgus stress. Stable to anterior/posterior drawer. Neurovascularly intact. Negative Halle. Negative Lachman. Positive EHL, FHL, HS and TA. Sensation intact to light touch all 5 nerves of the foot. Ambulates with Antalgic gait. Patella is mal tracking. Calf supple, non-tender. Negative tenderness to the medial joint line. Negative tenderness to the lateral joint line. Negative Crepitus. Good strength to hamstrings, quadriceps, dorsiflexors, and plantar flexors.  Knee Extensor Mechanism intact Pain around the patella.         Procedures      Imaging Results (Most Recent)       Procedure Component Value Units Date/Time    XR Knee 3 View Left [[id number]] Resulted: 05/29/25 1542     Updated: 05/29/25 1547             Result Review :     X-Ray Report:  Left knee X-Ray  Indication: Evaluation of the left knee  AP/Lateral and Sexton view(s)  Findings: Patella is mal tracking and mild tilt.   Prior studies available for comparison: no             Assessment and Plan     Diagnoses and all orders for this visit:    1. Left knee pain, unspecified chronicity (Primary)  -     XR Knee 3 View Left    2. Patellofemoral pain syndrome of left knee        Discussed the treatment plan with the patient. I reviewed the X-rays that were obtained today with the patient.     HEP exercises given for PFS.  She cannot take NSAIDS.      Use topical cream as needed.   Prescribed physical therapy.        Call or return if worsening symptoms.    Follow Up     PRN      Patient was given instructions and counseling regarding her condition or for health maintenance advice. Please see specific information pulled into the AVS if appropriate.     Scribed for Codie Morales MD by Victorina Reynoso MA.  05/29/25   15:42 EDT    I have personally performed the services described in this document as scribed by the above individual and it is  both accurate and complete. Codie Morales MD 05/29/25

## 2025-05-31 DIAGNOSIS — G43.701 CHRONIC MIGRAINE WITHOUT AURA WITH STATUS MIGRAINOSUS, NOT INTRACTABLE: ICD-10-CM

## 2025-06-02 RX ORDER — GALCANEZUMAB 120 MG/ML
INJECTION, SOLUTION SUBCUTANEOUS
Qty: 3 ML | Refills: 1 | Status: SHIPPED | OUTPATIENT
Start: 2025-06-02

## 2025-06-16 DIAGNOSIS — G90.A POTS (POSTURAL ORTHOSTATIC TACHYCARDIA SYNDROME): ICD-10-CM

## 2025-06-19 RX ORDER — METOPROLOL SUCCINATE 50 MG/1
50 TABLET, EXTENDED RELEASE ORAL DAILY
Qty: 90 TABLET | Refills: 1 | OUTPATIENT
Start: 2025-06-19

## 2025-06-19 NOTE — TELEPHONE ENCOUNTER
I have messaged patient thru my chart.     Attempted to call the patient and was unable to reach patient or leave a message.     Patient needs to follow up with Cardiologist.  Asuncion filled last time to give patient time to scheduled with her cardiologist.

## 2025-07-02 ENCOUNTER — TREATMENT (OUTPATIENT)
Dept: PHYSICAL THERAPY | Facility: CLINIC | Age: 50
End: 2025-07-02
Payer: COMMERCIAL

## 2025-07-02 DIAGNOSIS — G89.29 CHRONIC PAIN OF LEFT KNEE: Primary | ICD-10-CM

## 2025-07-02 DIAGNOSIS — R29.898 WEAKNESS OF LEFT LOWER EXTREMITY: ICD-10-CM

## 2025-07-02 DIAGNOSIS — M25.662 KNEE STIFFNESS, LEFT: ICD-10-CM

## 2025-07-02 DIAGNOSIS — M25.562 CHRONIC PAIN OF LEFT KNEE: Primary | ICD-10-CM

## 2025-07-02 DIAGNOSIS — R26.9 GAIT DISTURBANCE: ICD-10-CM

## 2025-07-02 PROCEDURE — 97110 THERAPEUTIC EXERCISES: CPT | Performed by: PHYSICAL THERAPIST

## 2025-07-02 PROCEDURE — 97535 SELF CARE MNGMENT TRAINING: CPT | Performed by: PHYSICAL THERAPIST

## 2025-07-02 PROCEDURE — 97161 PT EVAL LOW COMPLEX 20 MIN: CPT | Performed by: PHYSICAL THERAPIST

## 2025-07-02 NOTE — PROGRESS NOTES
Physical Therapy Initial Evaluation and Plan of Care     [address] [place] [id number]    Patient: Katy Denise   : 1975  Diagnosis/ICD-10 Code:  Chronic pain of left knee [M25.562, G89.29]  Referring practitioner: Codie Morales MD  Date of Initial Visit: 2025  Today's Date: 2025  Patient seen for 1 sessions           Subjective Questionnaire: LEFS: 33/80      Subjective  : Pt presents to physical therapy with chronic left knee pain ongoing for many years. She feels like it gives way a lot. Difficulty with kneeling, lunging. Better with exercises in the past have been helpful. Worse with stairs and hiking. X-rays show likely mal tracking of the the patella along with being tilted. She did undergo corrective bracing as a child for bilateral hip internal rotation.    Pt occupation/Living environment: [employer]    Patient Goals: Be able to hike and be active, pain free    Current Pain 3/10  Best Pain: 2/10  Worst Pain: 8/10        Past Medical Hx: DM     Past Medical History:   Diagnosis Date    Allergic rhinitis due to allergen     Anxiety disorder     Arthritis     Arthritis of back     Chest pain     NONE CURRENT, OCC SOAE    CTS (carpal tunnel syndrome)     Depression     Diabetes mellitus, type 2     Dislocated elbow     Essential hypertension 2021    Fracture, clavicle     Fracture, radius     Fracture, ulna     GERD (gastroesophageal reflux disease)     Gestational diabetes     Hyperlipidemia     Lumbosacral disc disease     Migraine     Wide-complex tachycardia 10/10/2023    Wrist sprain       Past Surgical History:   Procedure Laterality Date    BACK SURGERY  2008    CARDIAC CATHETERIZATION Left 2023    Procedure: Left Heart Cath with possible coronary angioplasty;  Surgeon: Mariam Buckner MD;  Location: [facility] CATH INVASIVE LOCATION;  Service: Cardiology;  Laterality: Left;    CARDIAC CATHETERIZATION  2023    CARPAL  TUNNEL RELEASE Bilateral     COLONOSCOPY      COLONOSCOPY N/A 09/08/2022    Procedure: COLONOSCOPY with cold snare polypectomy;  Surgeon: Keshawn Yates MD;  Location: [facility] ENDOSCOPY;  Service: General;  Laterality: N/A;  colon polyp    ENDOSCOPY      HYSTERECTOMY      LUMBAR SPINE SURGERY      Microdiscectomy of thoracic or lumbar spine    TUBAL ABDOMINAL LIGATION      WRIST SURGERY  2011    carpal tunnel             Objective          Tenderness   Left Knee   Tenderness in the medial joint line and patellar tendon.     Neurological Testing     Sensation     Knee   Left Knee   Intact: Light touch    Right Knee   Intact: light touch     Active Range of Motion   Left Knee   Flexion: 134 degrees   Extension: 0 degrees     Right Knee   Flexion: 143 degrees   Extension: 0 degrees     Strength/Myotome Testing     Left Hip   Planes of Motion   Flexion: 5  Extension: 4+  Abduction: 5  Adduction: 5    Right Hip   Planes of Motion   Flexion: 5  Extension: 5  Abduction: 5  Adduction: 5    Left Knee   Flexion: 4+  Extension: 4+  Quadriceps contraction: good    Right Knee   Flexion: 5  Extension: 5  Quadriceps contraction: good    Ambulation     Comments   Minimal deviation, though bilateral collapsed arches / increased foot pronation      See Exercise, Manual, and Modality Logs for complete treatment.     Assessment & Plan       Assessment  Impairments: abnormal gait, abnormal muscle firing, abnormal or restricted ROM, activity intolerance, impaired balance, impaired physical strength, pain with function and weight-bearing intolerance   Functional limitations: walking, standing and stooping   Assessment details: The patient presents to physical therapy with complaints of left knee pain with s/s of patellofemoral syndrome. The patient presents with associated knee weakness, stiffness, antalgic gait, and functional deficits (LEFS). The patient would benefit from skilled PT intervention to address the above mentioned  functional limitations.     Prognosis: good    Goals  Plan Goals: KNEE PROBLEMS    1. The patient has limited ROM of the left knee.   LTG 1:12 weeks:  The patient will demonstrate 145 degrees of ROM for the left knee in order to allow patient to ambulate.    STATUS:  New   STG 1a: 6 weeks:  The patient will demonstrate 140 degrees of ROM for the left knee.    STATUS:  New   STG 1b: 6 weeks: The patient will demonstrate independent HEP.    STATUS:  New        2. The patient has limited strength of the left knee.   LTG 2: 12 weeks: The patient will demonstrate 5 /5 strength for knee flexion and extension in order to allow patient improved joint stability    STATUS:  New   STG 2a: 6 weeks: The patient will demonstrate 4+ /5 strength for knee flexion and extension    STATUS:  New           3. Mobility: Walking/Moving Around Functional Limitation     LTG 3: 12 weeks:  The patient will demonstrate 25 % limitation by achieving a score of 60/80 on the LEFS.    STATUS:  New   STG 3a: 6 weeks:  The patient will demonstrate 50 % limitation by achieving a score of 40/80 on the LEFS.      STATUS:  New       Plan  Therapy options: will be seen for skilled therapy services  Planned modality interventions: TENS, cryotherapy, thermotherapy (hydrocollator packs) and dry needling  Planned therapy interventions: functional ROM exercises, gait training, home exercise program, manual therapy, strengthening, stretching, therapeutic activities, soft tissue mobilization, joint mobilization, neuromuscular re-education, balance/weight-bearing training and transfer training  Other planned therapy interventions: aquatic therapy  Frequency: 3x week  Duration in weeks: 12  Treatment plan discussed with: patient        Visit Diagnoses:    ICD-10-CM ICD-9-CM   1. Chronic pain of left knee  M25.562 719.46    G89.29 338.29   2. Knee stiffness, left  M25.662 719.56   3. Weakness of left lower extremity  R29.898 729.89   4. Gait disturbance  R26.9  781.2       History # of Personal Factors and/or Comorbidities: MODERATE (1-2)  Examination of Body System(s): # of elements: LOW (1-2)  Clinical Presentation: STABLE   Clinical Decision Making: LOW       Timed:         Manual Therapy:    0     mins  24972;     Therapeutic Exercise:    14     mins  75436;     Neuromuscular Alesia:    0    mins  42840;    Therapeutic Activity:     0     mins  97078;     Gait Trainin     mins  99780;     Ultrasound:     0     mins  33065;    Ionto                               0    mins   73184  Self Care                       8     mins   76094        Un-Timed:  Electrical Stimulation:    0     mins  91532 ( );  Dry Needling     0     mins self-pay  Canalith Repos    0     mins 32527  Traction     0     mins 70748      Timed Treatment:   22   mins   Total Treatment:     60   mins    PT SIGNATURE: Zeke Bhandari PT     Electronically signed 2025    [place] License: PT - [id number]     Initial Certification  Certification Period: 2025 thru 2025  I certify that the therapy services are furnished while this patient is under my care.  The services outlined above are required by this patient, and will be reviewed every 90 days.     PHYSICIAN: Codie Morales MD   NPI: [id number]                                        DATE:     Please sign and return via fax to [phone]. Thank you, [facility].

## 2025-07-11 ENCOUNTER — TREATMENT (OUTPATIENT)
Dept: PHYSICAL THERAPY | Facility: CLINIC | Age: 50
End: 2025-07-11
Payer: COMMERCIAL

## 2025-07-11 DIAGNOSIS — R29.898 WEAKNESS OF LEFT LOWER EXTREMITY: ICD-10-CM

## 2025-07-11 DIAGNOSIS — M25.662 KNEE STIFFNESS, LEFT: ICD-10-CM

## 2025-07-11 DIAGNOSIS — M25.562 CHRONIC PAIN OF LEFT KNEE: Primary | ICD-10-CM

## 2025-07-11 DIAGNOSIS — R26.9 GAIT DISTURBANCE: ICD-10-CM

## 2025-07-11 DIAGNOSIS — G89.29 CHRONIC PAIN OF LEFT KNEE: Primary | ICD-10-CM

## 2025-07-11 PROCEDURE — 97112 NEUROMUSCULAR REEDUCATION: CPT | Performed by: PHYSICAL THERAPIST

## 2025-07-11 PROCEDURE — 97110 THERAPEUTIC EXERCISES: CPT | Performed by: PHYSICAL THERAPIST

## 2025-07-11 PROCEDURE — 97530 THERAPEUTIC ACTIVITIES: CPT | Performed by: PHYSICAL THERAPIST

## 2025-07-11 NOTE — PROGRESS NOTES
Physical Therapy Daily Treatment Note                      [facility] [address] [facility] [place] [address]    Patient: Katy Denise   : 1975  Diagnosis/ICD-10 Code:  Chronic pain of left knee [M25.562, G89.29]  Referring practitioner: Codie Morales MD  Date of Initial Visit: Type: THERAPY  Noted: 2025  Today's Date: 2025  Patient seen for 2 sessions           Subjective   Pt reports the tape did fall off a few times but she was able to tape it again it did stay longer.  Pt states the tape did help when it was on.  Pt reports she did have a slight fall when she was out of town but she didn't hurt.     Objective   See Exercise, Manual, and Modality Logs for complete treatment.     Assessment/Plan    POC initiated previous visit.  Reports follow through of HEP and good results of taping when it stayed on.  L ITB tightness noted during therex.  Overall good tolerance of therex.  Will continue to benefit from treatments to decrease pain and increase strength         Continue to progress POC per pt tolerance.    Timed:  Manual Therapy:    0     mins  63272;  Therapeutic Exercise:    10     mins  22284;     Neuromuscular Alesia:   10    mins  80564;    Therapeutic Activity:     10     mins  25215;     Gait Trainin     mins  50847;         Un-timed:  Mechanical Traction      0     mins  81353  Dry Needling     0     mins self-pay  Electrical Stimulation:    0     mins  48226 ( );      Timed Treatment:   30   mins   Total Treatment:     30   mins    Kerri Martínez PTA  Physical Therapist Assistant    Electronically signed 2025    [place] License: [id number]

## 2025-07-16 ENCOUNTER — TREATMENT (OUTPATIENT)
Dept: PHYSICAL THERAPY | Facility: CLINIC | Age: 50
End: 2025-07-16
Payer: COMMERCIAL

## 2025-07-16 DIAGNOSIS — G89.29 CHRONIC PAIN OF LEFT KNEE: Primary | ICD-10-CM

## 2025-07-16 DIAGNOSIS — R29.898 WEAKNESS OF LEFT LOWER EXTREMITY: ICD-10-CM

## 2025-07-16 DIAGNOSIS — M25.662 KNEE STIFFNESS, LEFT: ICD-10-CM

## 2025-07-16 DIAGNOSIS — M25.562 CHRONIC PAIN OF LEFT KNEE: Primary | ICD-10-CM

## 2025-07-16 DIAGNOSIS — R26.9 GAIT DISTURBANCE: ICD-10-CM

## 2025-07-16 PROCEDURE — 97112 NEUROMUSCULAR REEDUCATION: CPT | Performed by: PHYSICAL THERAPIST

## 2025-07-16 PROCEDURE — 97530 THERAPEUTIC ACTIVITIES: CPT | Performed by: PHYSICAL THERAPIST

## 2025-07-16 PROCEDURE — 97110 THERAPEUTIC EXERCISES: CPT | Performed by: PHYSICAL THERAPIST

## 2025-07-16 NOTE — PROGRESS NOTES
Physical Therapy Daily Treatment Note                      [facility] [address] [facility] [place] [address]    Patient: Katy Denise   : 1975  Diagnosis/ICD-10 Code:  Chronic pain of left knee [M25.562, G89.29]  Referring practitioner: Codie Morales MD  Date of Initial Visit: Type: THERAPY  Noted: 2025  Today's Date: 2025  Patient seen for 3 sessions           Subjective   Pt reports the tape has helped and has stayed on really well.      Objective   See Exercise, Manual, and Modality Logs for complete treatment.     Assessment/Plan  Presents with tape still intact and reports good results.  Good tolerance and reports of progress since previous visit.  No increased symptoms reported during treatment.  Will continue to benefit from treatments            Continue to progress POC per pt tolerance.    Timed:  Manual Therapy:    0     mins  00546;  Therapeutic Exercise:    10     mins  30577;     Neuromuscular Alesia:   8    mins  05473;    Therapeutic Activity:     8     mins  65006;     Gait Trainin     mins  67262;         Un-timed:  Mechanical Traction      0     mins  88374  Dry Needling     0     mins self-pay  Electrical Stimulation:    0     mins  73642 ( );      Timed Treatment:   26   mins   Total Treatment:     26   mins    Kerri Martínez PTA  Physical Therapist Assistant    Electronically signed 2025    [place] License: [id number]

## 2025-07-23 ENCOUNTER — TREATMENT (OUTPATIENT)
Dept: PHYSICAL THERAPY | Facility: CLINIC | Age: 50
End: 2025-07-23
Payer: COMMERCIAL

## 2025-07-23 DIAGNOSIS — M25.562 CHRONIC PAIN OF LEFT KNEE: Primary | ICD-10-CM

## 2025-07-23 DIAGNOSIS — G89.29 CHRONIC PAIN OF LEFT KNEE: Primary | ICD-10-CM

## 2025-07-23 DIAGNOSIS — R26.9 GAIT DISTURBANCE: ICD-10-CM

## 2025-07-23 DIAGNOSIS — R29.898 WEAKNESS OF LEFT LOWER EXTREMITY: ICD-10-CM

## 2025-07-23 DIAGNOSIS — M25.662 KNEE STIFFNESS, LEFT: ICD-10-CM

## 2025-07-23 PROCEDURE — 97110 THERAPEUTIC EXERCISES: CPT

## 2025-07-23 PROCEDURE — 97112 NEUROMUSCULAR REEDUCATION: CPT

## 2025-07-23 PROCEDURE — 97530 THERAPEUTIC ACTIVITIES: CPT

## 2025-07-23 NOTE — PROGRESS NOTES
Physical Therapy Daily Treatment Note                      [facility] [address] [facility] [place] [address]    Patient: Katy Denise   : 1975  Diagnosis/ICD-10 Code:  Chronic pain of left knee [M25.562, G89.29]  Referring practitioner: Codie Morales MD  Date of Initial Visit: Type: THERAPY  Noted: 2025  Today's Date: 2025  Patient seen for 4 sessions           Subjective   Pt reports she had training last week, pt states she wore a brace and tried to limit her activities with her knee.  Pt states the tape is still helping a lot, reports the tape came off Monday and she started having more pain.      Objective   See Exercise, Manual, and Modality Logs for complete treatment.     Assessment/Plan  Continues to reports good progress despite slight flare-up due to increased activities.  Continues to report good results with taping to knee.  Initiated machine therex, pt tolerated well.             Continue to progress POC per pt tolerance.    Timed:  Manual Therapy:    0     mins  99939;  Therapeutic Exercise:    10     mins  82442;     Neuromuscular Alesia:   10    mins  08021;    Therapeutic Activity:     10     mins  00150;     Gait Trainin     mins  95022;         Un-timed:  Mechanical Traction      0     mins  56304  Dry Needling     0     mins self-pay  Electrical Stimulation:    0     mins  76373 ( );      Timed Treatment:   30   mins   Total Treatment:     30   mins    Kerri Martínez PTA  Physical Therapist Assistant    Electronically signed 2025    [place] License: [id number]

## 2025-07-30 ENCOUNTER — TREATMENT (OUTPATIENT)
Dept: PHYSICAL THERAPY | Facility: CLINIC | Age: 50
End: 2025-07-30
Payer: COMMERCIAL

## 2025-07-30 DIAGNOSIS — M25.562 CHRONIC PAIN OF LEFT KNEE: Primary | ICD-10-CM

## 2025-07-30 DIAGNOSIS — M25.662 KNEE STIFFNESS, LEFT: ICD-10-CM

## 2025-07-30 DIAGNOSIS — R29.898 WEAKNESS OF LEFT LOWER EXTREMITY: ICD-10-CM

## 2025-07-30 DIAGNOSIS — G89.29 CHRONIC PAIN OF LEFT KNEE: Primary | ICD-10-CM

## 2025-07-30 DIAGNOSIS — R26.9 GAIT DISTURBANCE: ICD-10-CM

## 2025-07-30 NOTE — PROGRESS NOTES
Progress Note  [facility] [address] [place] [address]      Patient: Katy Denise   : 1975  Diagnosis/ICD-10 Code:  Chronic pain of left knee [M25.562, G89.29]  Referring practitioner: Codie Morales MD  Date of Initial Visit: Type: THERAPY  Noted: 2025  Today's Date: 2025  Patient seen for 5 sessions      Subjective:   Subjective Questionnaire: LEFS: 33/80 = 41.25% Function  Clinical Progress: improved  Home Program Compliance: Yes  Treatment has included: therapeutic exercise, neuromuscular re-education, manual therapy, therapeutic activity, and gait training    Subjective   The patient reported that her knee is bothering 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 She rates her pain as a 5/10 today. The pain is located mainly on the medial side of her knee. She can tell that the taping has really helped, but otherwise she hasn't noticed much of a change yet with PT. She would like to finish out her last two scheduled PT visits.      Objective   Tenderness   Left Knee   Tenderness in the medial joint line and patellar tendon.      Neurological Testing      Sensation      Knee   Left Knee   Intact: Light touch     Right Knee   Intact: light touch      Active Range of Motion   Left Knee   Flexion: 132 degrees   Extension: 0 degrees      Right Knee   Flexion: 143 degrees   Extension: 0 degrees      Strength/Myotome Testing      Left Hip   Planes of Motion   Flexion: 5  Extension: 4+  Abduction: 5  Adduction: 5     Right Hip   Planes of Motion   Flexion: 5  Extension: 5  Abduction: 5  Adduction: 5     Left Knee   Flexion: 5  Extension: 4+  Quadriceps contraction: good     Right Knee   Flexion: 5  Extension: 5  Quadriceps contraction: good     Ambulation      Comments   Minimal deviation, though bilateral collapsed arches / increased foot pronation     See Exercise, Manual, and Modality Logs for complete treatment.     Physical Exam          Assessment/Plan  Progress toward previous goals: Partially Met    The patient was re-evaluated today and presents with no significant changes compared to her initial evaluation. She continues to present with left knee stiffness, mild weakness, tenderness to palpation and functional deficits. We added in more gluteal strengthening exercise today and foam rolling for her IT band. She would benefit from continued skilled physical therapy to address remaining functional deficits and to allow the patient to return to her prior level of function.     Goals  Plan Goals: KNEE PROBLEMS     1. The patient has limited ROM of the left knee.              LTG 1:12 weeks:  The patient will demonstrate 145 degrees of ROM for the left knee in order to allow patient to ambulate.                          STATUS:  Progressing              STG 1a: 6 weeks:  The patient will demonstrate 140 degrees of ROM for the left knee.                          STATUS: Progressing              STG 1b: 6 weeks: The patient will demonstrate independent HEP.                          STATUS:  Progressing                   2. The patient has limited strength of the left knee.              LTG 2: 12 weeks: The patient will demonstrate 5 /5 strength for knee flexion and extension in order to allow patient improved joint stability                          STATUS:  Progressing              STG 2a: 6 weeks: The patient will demonstrate 4+ /5 strength for knee flexion and extension                          STATUS:  Met                                  3. Mobility: Walking/Moving Around Functional Limitation                               LTG 3: 12 weeks:  The patient will demonstrate 25 % limitation by achieving a score of 60/80 on the LEFS.                          STATUS:  Progressing              STG 3a: 6 weeks:  The patient will demonstrate 50 % limitation by achieving a score of 40/80 on the LEFS.                            STATUS:  Progressing  Assessment & Plan           Recommendations: Continue as planned  Timeframe: 1 month  Prognosis to achieve goals: fair    PT Signature: Ranjan Lowery PT    Electronically signed 2025    [place] License: PT - [id number]       Timed:  Manual Therapy:    0     mins  76782;  Therapeutic Exercise:    24     mins  25487;     Neuromuscular Alesia:   8    mins  24688;    Therapeutic Activity:     10     mins  17334;     Gait Trainin     mins  69716;     Aquatics                         0      mins  86772    Un-timed:  Mechanical Traction      0     mins  05503  Dry Needling     0     mins self-pay  Electrical Stimulation:    0     mins  78554 ( )  Re-evaluation:               0     mins 90907;    Timed Treatment:   42   mins   Total Treatment:     42   mins

## 2025-07-31 ENCOUNTER — OFFICE VISIT (OUTPATIENT)
Dept: DIABETES SERVICES | Facility: HOSPITAL | Age: 50
End: 2025-07-31
Payer: COMMERCIAL

## 2025-07-31 VITALS
HEART RATE: 78 BPM | WEIGHT: 164 LBS | BODY MASS INDEX: 26.36 KG/M2 | OXYGEN SATURATION: 100 % | DIASTOLIC BLOOD PRESSURE: 77 MMHG | HEIGHT: 66 IN | SYSTOLIC BLOOD PRESSURE: 111 MMHG

## 2025-07-31 DIAGNOSIS — E66.3 OVERWEIGHT (BMI 25.0-29.9): ICD-10-CM

## 2025-07-31 DIAGNOSIS — E11.40 TYPE 2 DIABETES MELLITUS WITH DIABETIC NEUROPATHY, WITHOUT LONG-TERM CURRENT USE OF INSULIN: ICD-10-CM

## 2025-07-31 DIAGNOSIS — E11.9 TYPE 2 DIABETES MELLITUS WITH HEMOGLOBIN A1C GOAL OF LESS THAN 7.0%: Primary | ICD-10-CM

## 2025-07-31 LAB
EXPIRATION DATE: NORMAL
GLUCOSE BLDC GLUCOMTR-MCNC: 117 MG/DL (ref 70–99)
HBA1C MFR BLD: 5.7 % (ref 4.5–5.7)
Lab: NORMAL

## 2025-07-31 PROCEDURE — 83036 HEMOGLOBIN GLYCOSYLATED A1C: CPT | Performed by: NURSE PRACTITIONER

## 2025-07-31 PROCEDURE — 82948 REAGENT STRIP/BLOOD GLUCOSE: CPT | Performed by: NURSE PRACTITIONER

## 2025-07-31 PROCEDURE — G0463 HOSPITAL OUTPT CLINIC VISIT: HCPCS | Performed by: NURSE PRACTITIONER

## 2025-07-31 RX ORDER — TIRZEPATIDE 12.5 MG/.5ML
12.5 INJECTION, SOLUTION SUBCUTANEOUS
Qty: 6 ML | Refills: 1 | Status: SHIPPED | OUTPATIENT
Start: 2025-07-31 | End: 2025-10-29

## 2025-07-31 NOTE — PROGRESS NOTES
Chief Complaint  Diabetes (Follow up, med mgt, A1c eval)    Referred By: DELIO Gutierrez    Patient or patient representative verbalized consent for the use of Ambient Listening during the visit with  DELIO Segundo for chart documentation. 7/31/2025  07:25 EDT    Subjective          Katy Denise presents to [facility] for diabetes medication management    History of Present Illness    History of Present Illness  The patient presents for evaluation of diabetes.    She reports satisfactory blood sugar levels, typically ranging from 80 to 100 in the mornings. However, she has experienced a few episodes of hypoglycemia, characterized by symptoms such as shakiness and feeling almost drunk. During these episodes, her blood sugar was recorded at 78. She managed these episodes by consuming juice, which alleviated her symptoms. She is uncertain if these episodes were due to dehydration, as she noticed that her symptoms would resolve upon stepping outside.    She has lost significant weight since her last visit, dropping from 178 pounds to 164 pounds.  She is lost a total of 46 pounds since starting Mounjaro.  She attributes this weight loss to her use of Mounjaro and increased physical activity, including walking her new puppy. She reports no new health issues since her last visit. She has been engaging in weight training exercises, including daily squats, planks three times a week, abdominal exercises three times a week, arm exercises every other day, and additional leg exercises on non-squat days. She also walks regularly and occasionally uses an elliptical machine.    She reports no excessive thirst or urination, blurred vision, or fatigue, although she does report feeling constantly tired. She is currently on a 12.5 mg dose of Mounjaro and is requesting a refill.    Occupations: Teacher         Visit type:  follow-up  Diabetes type:  Type 2  Current diabetes  status/concerns/issues: Reports her blood sugar levels have been very well-controlled.  Admits 1 episode that appeared is hypoglycemia states her blood sugar dropped to 78 and she was shaky and had a feeling almost like being drunk.  States she ate something and it resolved.  Other health concerns: No new health concerns  Current Diabetes symptoms:    Polyuria: No   Polydipsia: No   Polyphagia: No   Blurred vision: No   Excessive fatigue: Yes    Known Diabetes complications:  Neuropathy: Numbness and Tingling; Location: Feet  Renal: None  Eyes: No Retinopathy reported on current eye exam; Location: Bilateral; Last Eye Exam:  5/22/24 ; Location: [facility]  Amputation/Wounds:  slow to heal  GI: Diarrhea  Cardiovascular: Hyperlipidemia and Other: tachycardia-POTS  ED: None  Other: None  Hypoglycemia:  None reported at this time  Hypoglycemia Symptoms:  No hypoglycemia at this time  Current diabetes treatment:   Mounjaro 12.5 mg once wk   Blood glucose device:  Meter  Blood glucose monitoring frequency:  1  Blood glucose range/average:  80-100mg/dl  Glucose Source: Patient Reported  Dietary behavior:  Limits high carb/sweet foods, Avoids sugary drinks, Number of meals each day - 3; Number of snacks each day - 1  Activity/Exercise:   walks 1 mile daily, swim,  wt training    Past Medical History:   Diagnosis Date    Allergic rhinitis due to allergen     Anxiety disorder     Arthritis     Arthritis of back 2010    Chest pain     NONE CURRENT, OCC SOAE    CTS (carpal tunnel syndrome) 2010    Depression     Diabetes mellitus, type 2     Dislocated elbow 1998    Essential hypertension 06/22/2021    Fracture, clavicle 1984    Fracture, radius 1988    Fracture, ulna 1988    GERD (gastroesophageal reflux disease)     Gestational diabetes     Hyperlipidemia     Lumbosacral disc disease 2007    Migraine     Wide-complex tachycardia 10/10/2023    Wrist sprain      Past Surgical History:   Procedure Laterality Date    BACK  SURGERY  2008    CARDIAC CATHETERIZATION Left 2023    Procedure: Left Heart Cath with possible coronary angioplasty;  Surgeon: Mariam Buckner MD;  Location: [facility] CATH INVASIVE LOCATION;  Service: Cardiology;  Laterality: Left;    CARDIAC CATHETERIZATION  2023    CARPAL TUNNEL RELEASE Bilateral     COLONOSCOPY      COLONOSCOPY N/A 2022    Procedure: COLONOSCOPY with cold snare polypectomy;  Surgeon: Keshawn Yates MD;  Location: [facility] ENDOSCOPY;  Service: General;  Laterality: N/A;  colon polyp    ENDOSCOPY      HYSTERECTOMY      LUMBAR SPINE SURGERY      Microdiscectomy of thoracic or lumbar spine    TUBAL ABDOMINAL LIGATION      WRIST SURGERY  2011    carpal tunnel     Family History   Problem Relation Age of Onset    Heart disease Mother     Thyroid disease Mother     Restless legs syndrome Mother     Obesity Mother     Insomnia Mother     Sleep walking Mother     Diabetes Brother     Obesity Brother     Heart disease Maternal Grandmother     Cancer Maternal Grandmother     Arrhythmia Maternal Grandfather     Heart attack Maternal Grandfather     Heart disease Maternal Grandfather     Stroke Other     Heart disease Other     Liver disease Other         cirrhosis    Diabetes Other     Cancer Paternal Grandmother     Cancer Paternal Uncle     Cancer Maternal Uncle     Diabetes Brother     Obesity Brother     Malig Hyperthermia Neg Hx      Social History     Socioeconomic History    Marital status:    Tobacco Use    Smoking status: Former     Current packs/day: 0.00     Average packs/day: 0.3 packs/day for 15.0 years (3.8 ttl pk-yrs)     Types: Cigarettes     Start date: 1994     Quit date: 2009     Years since quittin.5    Smokeless tobacco: Never    Tobacco comments:     off and on for 30 years; .23ppd   Vaping Use    Vaping status: Never Used   Substance and Sexual Activity    Alcohol use: Yes     Alcohol/week: 2.0 standard drinks of alcohol     Types: 2 Drinks  "containing 0.5 oz of alcohol per week     Comment: occ    Drug use: Not Currently    Sexual activity: Defer     Partners: Male     Birth control/protection: Vasectomy, Hysterectomy     Allergies   Allergen Reactions    Tomato Anaphylaxis    Latex Rash    Penicillins Unknown - Low Severity     \"My mother was very allergic.\"  \"My mother was very allergic.\"         Current Outpatient Medications:     Blood Glucose Monitoring Suppl device, Use as directed for blood glucose monitoring, Disp: 1 each, Rfl: 0    busPIRone (BUSPAR) 7.5 MG tablet, TAKE 1 TABLET BY MOUTH TWICE DAILY AS NEEDED FOR ANXIETY, Disp: 60 tablet, Rfl: 2    Emgality 120 MG/ML auto-injector pen, ADMINISTER 1 ML UNDER THE SKIN EVERY 30 DAYS AS DIRECTED, Disp: 3 mL, Rfl: 1    EPINEPHrine (EPIPEN) 0.3 MG/0.3ML solution auto-injector injection, Inject 0.3 mL under the skin into the appropriate area as directed 1 (One) Time., Disp: , Rfl:     escitalopram (LEXAPRO) 20 MG tablet, TAKE 1 TABLET BY MOUTH DAILY, Disp: 90 tablet, Rfl: 1    glucose blood test strip, Test blood glucose 1 times each day, Disp: 100 each, Rfl: 5    Lancets misc, Test blood glucose 1 times each day, Disp: 100 each, Rfl: 5    losartan (COZAAR) 25 MG tablet, TAKE 1 TABLET BY MOUTH DAILY, Disp: 90 tablet, Rfl: 1    metoprolol succinate XL (TOPROL-XL) 50 MG 24 hr tablet, Take 1 tablet by mouth Daily., Disp: 90 tablet, Rfl: 1    ondansetron (ZOFRAN) 4 MG tablet, TAKE 1 TABLET BY MOUTH EVERY 8 HOURS AS NEEDED FOR NAUSEA OR VOMITING, Disp: 24 tablet, Rfl: 1    rosuvastatin (CRESTOR) 10 MG tablet, TAKE 1 TABLET BY MOUTH EVERY NIGHT, Disp: 90 tablet, Rfl: 1    traZODone (DESYREL) 50 MG tablet, TAKE 1 TABLET BY MOUTH EVERY NIGHT, Disp: 90 tablet, Rfl: 1    Tirzepatide (Mounjaro) 12.5 MG/0.5ML solution auto-injector, Inject 0.5 mL under the skin into the appropriate area as directed Every 7 (Seven) Days for 90 days., Disp: 6 mL, Rfl: 1    Objective     Vitals:    07/31/25 0719   BP: 111/77 " "  Pulse: 78   SpO2: 100%   Weight: 74.4 kg (164 lb)   Height: 167.6 cm (66\")   PainSc: 0-No pain     Body mass index is 26.47 kg/m².    Physical Exam  Constitutional:       Appearance: Normal appearance. She is normal weight.      Comments: Overweight (BMI 25 - 29.9) Pt Current BMI = 26.47     HENT:      Head: Normocephalic and atraumatic.      Right Ear: External ear normal.      Left Ear: External ear normal.      Nose: Nose normal.   Eyes:      Extraocular Movements: Extraocular movements intact.      Conjunctiva/sclera: Conjunctivae normal.   Pulmonary:      Effort: Pulmonary effort is normal.   Musculoskeletal:         General: Normal range of motion.      Cervical back: Normal range of motion.   Skin:     General: Skin is warm and dry.   Neurological:      General: No focal deficit present.      Mental Status: She is alert and oriented to person, place, and time. Mental status is at baseline.   Psychiatric:         Mood and Affect: Mood normal.         Behavior: Behavior normal.         Thought Content: Thought content normal.         Judgment: Judgment normal.         Diabetic Foot Exam Performed and Monofilament Test Performed  Diabetic foot exam:   Left: Filament test present   Pulses Dorsalis Pedis:  present  Posterior Tibial:  present   Vibratory sensation normal   Proprioception normal   Sharp/dull discrimination normal       Right: Filament test present   Pulses Dorsalis Pedis:  present  Posterior Tibial:  present   Vibratory sensation normal   Proprioception normal   Sharp/dull discrimination normal     Result Review :   The following data was reviewed by: DELIO Segundo on 07/31/2025:    Most Recent A1C          7/31/2025    07:24   HGBA1C Most Recent   Hemoglobin A1C 5.7        A1C Last 3 Results          1/21/2025    15:59 4/22/2025    15:53 7/31/2025    07:24   HGBA1C Last 3 Results   Hemoglobin A1C 6.1  5.8  5.7      A1c collected in the office today is 5.7%, indicating Controlled " Type II diabetes.  This result is down from the prior result of 5.8% collected on 4/22/25.     Glucose   Date Value Ref Range Status   07/31/2025 117 (H) 70 - 99 mg/dL Final     Comment:     Serial Number: [id number]:  [id number]     Point of care glucose in the office today is within normal limits for nonfasting glucose    Creatinine   Date Value Ref Range Status   01/30/2025 0.83 0.57 - 1.00 mg/dL Final   08/28/2024 0.75 0.57 - 1.00 mg/dL Final     eGFR   Date Value Ref Range Status   01/30/2025 87.1 >60.0 mL/min/1.73 Final   08/28/2024 99.0 >60.0 mL/min/1.73 Final     Labs collected on 1/30/2025 show Stage II mild (GFR = 60-89mL/min)    Microalbumin, Urine   Date Value Ref Range Status   01/21/2025 <1.2 mg/dL Final   11/21/2023 2.2 mg/dL Final     Creatinine, Urine   Date Value Ref Range Status   01/21/2025 205.6 mg/dL Final     Microalbumin/Creatinine Ratio   Date Value Ref Range Status   01/21/2025   Final     Comment:     Unable to calculate   04/20/2021 17.5 0.0 - 35.0 mg/g[Cre] Final     Urine microalbuminuria collected on 1/21/2025 is negative for microalbuminuria    Total Cholesterol   Date Value Ref Range Status   01/30/2025 113 0 - 200 mg/dL Final   05/24/2024 189 0 - 200 mg/dL Final     Triglycerides   Date Value Ref Range Status   01/30/2025 98 0 - 150 mg/dL Final   05/24/2024 472 (H) 0 - 150 mg/dL Final     HDL Cholesterol   Date Value Ref Range Status   01/30/2025 46 40 - 60 mg/dL Final   05/24/2024 45 40 - 60 mg/dL Final     LDL Cholesterol    Date Value Ref Range Status   01/30/2025 48 0 - 100 mg/dL Final   05/24/2024 71 0 - 100 mg/dL Final     Lipid panel collected on 1/30/2025 shows normal lipid panel              Diagnoses and all orders for this visit:    1. Type 2 diabetes mellitus with hemoglobin A1c goal of less than 7.0% (Primary)  -     POC Glycosylated Hemoglobin (Hb A1C)  -     Tirzepatide (Mounjaro) 12.5 MG/0.5ML solution auto-injector; Inject 0.5 mL under the skin into the  appropriate area as directed Every 7 (Seven) Days for 90 days.  Dispense: 6 mL; Refill: 1    2. Type 2 diabetes mellitus with diabetic neuropathy, without long-term current use of insulin    3. Overweight (BMI 25.0-29.9)    Other orders  -     POC Glucose          Assessment & Plan  1. Diabetes Mellitus.  - A1c level has decreased from 6.1 to 5.7, indicating improved glycemic control.  - Reports occasional symptoms of hypoglycemia, such as feeling shaky and sweaty, with blood sugar readings around 78 mg/dL.  - No evidence of neuropathy on foot examination.  - Prescription for Mounjaro 12.5 mg has been sent to pharmacy. Advised to continue current regimen and monitor blood sugar levels closely.      The patient will monitor her blood glucose levels 1 time daily fasting.  If she develops problematic hyperglycemia or hypoglycemia or adverse drug reactions, she will contact the office for further instructions.        Follow Up     Return in about 3 months (around 10/31/2025) for Medication Mgmt.    Patient was given instructions and counseling regarding her condition or for health maintenance advice. Please see specific information pulled into the AVS if appropriate.     Asuncion Barnard, APRJEFF  07/31/2025      Dictated Utilizing Dragon Dictation.  Please note that portions of this note were completed with a voice recognition program.  Part of this note may be an electronic transcription/translation of spoken language to printed text using the Dragon Dictation System.

## 2025-08-06 ENCOUNTER — TREATMENT (OUTPATIENT)
Dept: PHYSICAL THERAPY | Facility: CLINIC | Age: 50
End: 2025-08-06
Payer: COMMERCIAL

## 2025-08-06 DIAGNOSIS — M25.662 KNEE STIFFNESS, LEFT: ICD-10-CM

## 2025-08-06 DIAGNOSIS — R26.9 GAIT DISTURBANCE: ICD-10-CM

## 2025-08-06 DIAGNOSIS — R29.898 WEAKNESS OF LEFT LOWER EXTREMITY: ICD-10-CM

## 2025-08-06 DIAGNOSIS — M25.562 CHRONIC PAIN OF LEFT KNEE: Primary | ICD-10-CM

## 2025-08-06 DIAGNOSIS — G89.29 CHRONIC PAIN OF LEFT KNEE: Primary | ICD-10-CM

## 2025-08-06 PROCEDURE — 97112 NEUROMUSCULAR REEDUCATION: CPT

## 2025-08-06 PROCEDURE — 97530 THERAPEUTIC ACTIVITIES: CPT

## 2025-08-06 PROCEDURE — 97110 THERAPEUTIC EXERCISES: CPT

## 2025-08-08 DIAGNOSIS — G90.A POTS (POSTURAL ORTHOSTATIC TACHYCARDIA SYNDROME): ICD-10-CM

## 2025-08-08 RX ORDER — METOPROLOL SUCCINATE 50 MG/1
50 TABLET, EXTENDED RELEASE ORAL DAILY
Qty: 90 TABLET | Refills: 1 | OUTPATIENT
Start: 2025-08-08

## 2025-08-11 ENCOUNTER — TREATMENT (OUTPATIENT)
Dept: PHYSICAL THERAPY | Facility: CLINIC | Age: 50
End: 2025-08-11
Payer: COMMERCIAL

## 2025-08-11 DIAGNOSIS — R29.898 WEAKNESS OF LEFT LOWER EXTREMITY: ICD-10-CM

## 2025-08-11 DIAGNOSIS — M25.662 KNEE STIFFNESS, LEFT: ICD-10-CM

## 2025-08-11 DIAGNOSIS — G89.29 CHRONIC PAIN OF LEFT KNEE: Primary | ICD-10-CM

## 2025-08-11 DIAGNOSIS — M25.562 CHRONIC PAIN OF LEFT KNEE: Primary | ICD-10-CM

## 2025-08-11 DIAGNOSIS — R26.9 GAIT DISTURBANCE: ICD-10-CM

## 2025-08-11 PROCEDURE — 97530 THERAPEUTIC ACTIVITIES: CPT | Performed by: PHYSICAL THERAPIST

## 2025-08-11 PROCEDURE — 97110 THERAPEUTIC EXERCISES: CPT | Performed by: PHYSICAL THERAPIST

## 2025-08-12 ENCOUNTER — APPOINTMENT (OUTPATIENT)
Dept: CT IMAGING | Facility: HOSPITAL | Age: 50
End: 2025-08-12
Payer: OTHER MISCELLANEOUS

## 2025-08-12 ENCOUNTER — HOSPITAL ENCOUNTER (EMERGENCY)
Facility: HOSPITAL | Age: 50
Discharge: HOME | End: 2025-08-12
Attending: EMERGENCY MEDICINE | Admitting: EMERGENCY MEDICINE
Payer: OTHER MISCELLANEOUS

## 2025-08-12 ENCOUNTER — APPOINTMENT (OUTPATIENT)
Dept: GENERAL RADIOLOGY | Facility: HOSPITAL | Age: 50
End: 2025-08-12
Payer: OTHER MISCELLANEOUS

## 2025-08-12 VITALS
HEIGHT: 66 IN | TEMPERATURE: 97.8 F | SYSTOLIC BLOOD PRESSURE: 129 MMHG | OXYGEN SATURATION: 93 % | RESPIRATION RATE: 18 BRPM | DIASTOLIC BLOOD PRESSURE: 73 MMHG | WEIGHT: 169.97 LBS | HEART RATE: 70 BPM | BODY MASS INDEX: 27.32 KG/M2

## 2025-08-12 DIAGNOSIS — R07.81 RIB PAIN: Primary | ICD-10-CM

## 2025-08-12 PROCEDURE — 25010000002 KETOROLAC TROMETHAMINE PER 15 MG

## 2025-08-12 PROCEDURE — 71045 X-RAY EXAM CHEST 1 VIEW: CPT

## 2025-08-12 PROCEDURE — 96372 THER/PROPH/DIAG INJ SC/IM: CPT

## 2025-08-12 PROCEDURE — 99284 EMERGENCY DEPT VISIT MOD MDM: CPT

## 2025-08-12 PROCEDURE — 25010000002 ORPHENADRINE CITRATE PER 60 MG

## 2025-08-12 PROCEDURE — 71250 CT THORAX DX C-: CPT

## 2025-08-12 RX ORDER — HYDROCODONE BITARTRATE AND ACETAMINOPHEN 5; 325 MG/1; MG/1
1 TABLET ORAL EVERY 8 HOURS PRN
Qty: 9 TABLET | Refills: 0 | Status: SHIPPED | OUTPATIENT
Start: 2025-08-12 | End: 2025-08-20

## 2025-08-12 RX ORDER — KETOROLAC TROMETHAMINE 30 MG/ML
30 INJECTION, SOLUTION INTRAMUSCULAR; INTRAVENOUS ONCE
Status: COMPLETED | OUTPATIENT
Start: 2025-08-12 | End: 2025-08-12

## 2025-08-12 RX ORDER — LIDOCAINE 50 MG/G
1 PATCH TOPICAL EVERY 24 HOURS
Qty: 3 PATCH | Refills: 0 | Status: SHIPPED | OUTPATIENT
Start: 2025-08-12 | End: 2025-08-15

## 2025-08-12 RX ORDER — ORPHENADRINE CITRATE 30 MG/ML
60 INJECTION INTRAMUSCULAR; INTRAVENOUS ONCE
Status: COMPLETED | OUTPATIENT
Start: 2025-08-12 | End: 2025-08-12

## 2025-08-12 RX ORDER — NAPROXEN 500 MG/1
500 TABLET ORAL 2 TIMES DAILY PRN
Qty: 20 TABLET | Refills: 0 | Status: SHIPPED | OUTPATIENT
Start: 2025-08-12

## 2025-08-12 RX ORDER — HYDROCODONE BITARTRATE AND ACETAMINOPHEN 5; 325 MG/1; MG/1
1 TABLET ORAL EVERY 6 HOURS PRN
Refills: 0 | Status: DISCONTINUED | OUTPATIENT
Start: 2025-08-12 | End: 2025-08-12 | Stop reason: HOSPADM

## 2025-08-12 RX ADMIN — HYDROCODONE BITARTRATE AND ACETAMINOPHEN 1 TABLET: 5; 325 TABLET ORAL at 15:57

## 2025-08-12 RX ADMIN — ORPHENADRINE CITRATE 60 MG: 60 INJECTION INTRAMUSCULAR; INTRAVENOUS at 14:28

## 2025-08-12 RX ADMIN — KETOROLAC TROMETHAMINE 30 MG: 30 INJECTION INTRAMUSCULAR; INTRAVENOUS at 14:28

## 2025-08-20 ENCOUNTER — OFFICE VISIT (OUTPATIENT)
Dept: FAMILY MEDICINE CLINIC | Facility: CLINIC | Age: 50
End: 2025-08-20
Payer: COMMERCIAL

## 2025-08-20 VITALS
BODY MASS INDEX: 26.52 KG/M2 | WEIGHT: 165 LBS | OXYGEN SATURATION: 98 % | DIASTOLIC BLOOD PRESSURE: 68 MMHG | HEART RATE: 94 BPM | HEIGHT: 66 IN | SYSTOLIC BLOOD PRESSURE: 94 MMHG

## 2025-08-20 DIAGNOSIS — Z91.040 ALLERGY TO LATEX: ICD-10-CM

## 2025-08-20 DIAGNOSIS — Z23 NEED FOR HEPATITIS B VACCINATION: ICD-10-CM

## 2025-08-20 DIAGNOSIS — Z12.31 ENCOUNTER FOR SCREENING MAMMOGRAM FOR MALIGNANT NEOPLASM OF BREAST: ICD-10-CM

## 2025-08-20 DIAGNOSIS — F32.A DEPRESSION, UNSPECIFIED DEPRESSION TYPE: ICD-10-CM

## 2025-08-20 DIAGNOSIS — Z00.00 ANNUAL PHYSICAL EXAM: Primary | ICD-10-CM

## 2025-08-20 DIAGNOSIS — E78.5 HYPERLIPIDEMIA, UNSPECIFIED HYPERLIPIDEMIA TYPE: ICD-10-CM

## 2025-08-20 DIAGNOSIS — G43.001 MIGRAINE WITHOUT AURA AND WITH STATUS MIGRAINOSUS, NOT INTRACTABLE: ICD-10-CM

## 2025-08-20 DIAGNOSIS — E11.9 TYPE 2 DIABETES MELLITUS WITHOUT COMPLICATION, WITHOUT LONG-TERM CURRENT USE OF INSULIN: ICD-10-CM

## 2025-08-20 DIAGNOSIS — F51.01 PRIMARY INSOMNIA: ICD-10-CM

## 2025-08-20 DIAGNOSIS — F41.9 ANXIETY DISORDER, UNSPECIFIED TYPE: ICD-10-CM

## 2025-08-20 RX ORDER — EPINEPHRINE 0.3 MG/.3ML
0.3 INJECTION SUBCUTANEOUS ONCE
Qty: 1 EACH | Refills: 0 | Status: SHIPPED | OUTPATIENT
Start: 2025-08-20 | End: 2025-08-20

## 2025-08-22 DIAGNOSIS — G43.001 MIGRAINE WITHOUT AURA AND WITH STATUS MIGRAINOSUS, NOT INTRACTABLE: ICD-10-CM

## 2025-08-27 ENCOUNTER — TRANSCRIBE ORDERS (OUTPATIENT)
Dept: PHYSICAL THERAPY | Facility: CLINIC | Age: 50
End: 2025-08-27
Payer: COMMERCIAL

## 2025-08-27 ENCOUNTER — LAB (OUTPATIENT)
Facility: HOSPITAL | Age: 50
End: 2025-08-27
Payer: COMMERCIAL

## 2025-08-27 DIAGNOSIS — E11.9 TYPE 2 DIABETES MELLITUS WITHOUT COMPLICATION, WITHOUT LONG-TERM CURRENT USE OF INSULIN: ICD-10-CM

## 2025-08-27 DIAGNOSIS — S20.219A CONTUSION OF CHEST WALL, INITIAL ENCOUNTER: Primary | ICD-10-CM

## 2025-08-27 DIAGNOSIS — S30.1XXA CONTUSION OF ABDOMINAL WALL, INITIAL ENCOUNTER: ICD-10-CM

## 2025-08-27 DIAGNOSIS — E78.5 HYPERLIPIDEMIA, UNSPECIFIED HYPERLIPIDEMIA TYPE: ICD-10-CM

## 2025-08-27 DIAGNOSIS — Z00.00 ANNUAL PHYSICAL EXAM: ICD-10-CM

## 2025-08-27 LAB
ALBUMIN SERPL-MCNC: 4.5 G/DL (ref 3.5–5.2)
ALBUMIN UR-MCNC: 1.6 MG/DL
ALBUMIN/GLOB SERPL: 1.5 G/DL
ALP SERPL-CCNC: 51 U/L (ref 39–117)
ALT SERPL W P-5'-P-CCNC: 39 U/L (ref 1–33)
ANION GAP SERPL CALCULATED.3IONS-SCNC: 10 MMOL/L (ref 5–15)
AST SERPL-CCNC: 29 U/L (ref 1–32)
BASOPHILS # BLD AUTO: 0.05 10*3/MM3 (ref 0–0.2)
BASOPHILS NFR BLD AUTO: 0.9 % (ref 0–1.5)
BILIRUB SERPL-MCNC: 0.5 MG/DL (ref 0–1.2)
BUN SERPL-MCNC: 17 MG/DL (ref 6–20)
BUN/CREAT SERPL: 19.5 (ref 7–25)
CALCIUM SPEC-SCNC: 10.3 MG/DL (ref 8.6–10.5)
CHLORIDE SERPL-SCNC: 102 MMOL/L (ref 98–107)
CHOLEST SERPL-MCNC: 116 MG/DL (ref 0–200)
CO2 SERPL-SCNC: 24 MMOL/L (ref 22–29)
CREAT SERPL-MCNC: 0.87 MG/DL (ref 0.57–1)
CREAT UR-MCNC: 231.4 MG/DL
DEPRECATED RDW RBC AUTO: 43 FL (ref 37–54)
EGFRCR SERPLBLD CKD-EPI 2021: 82.3 ML/MIN/1.73
EOSINOPHIL # BLD AUTO: 0.13 10*3/MM3 (ref 0–0.4)
EOSINOPHIL NFR BLD AUTO: 2.2 % (ref 0.3–6.2)
ERYTHROCYTE [DISTWIDTH] IN BLOOD BY AUTOMATED COUNT: 12.4 % (ref 12.3–15.4)
GLOBULIN UR ELPH-MCNC: 3.1 GM/DL
GLUCOSE SERPL-MCNC: 119 MG/DL (ref 65–99)
HBA1C MFR BLD: 6 % (ref 4.8–5.6)
HCT VFR BLD AUTO: 44.4 % (ref 34–46.6)
HDLC SERPL-MCNC: 46 MG/DL (ref 40–60)
HGB BLD-MCNC: 14.5 G/DL (ref 12–15.9)
IMM GRANULOCYTES # BLD AUTO: 0.01 10*3/MM3 (ref 0–0.05)
IMM GRANULOCYTES NFR BLD AUTO: 0.2 % (ref 0–0.5)
LDLC SERPL CALC-MCNC: 49 MG/DL (ref 0–100)
LDLC/HDLC SERPL: 1.01 {RATIO}
LYMPHOCYTES # BLD AUTO: 3.08 10*3/MM3 (ref 0.7–3.1)
LYMPHOCYTES NFR BLD AUTO: 53.3 % (ref 19.6–45.3)
MCH RBC QN AUTO: 30.2 PG (ref 26.6–33)
MCHC RBC AUTO-ENTMCNC: 32.7 G/DL (ref 31.5–35.7)
MCV RBC AUTO: 92.5 FL (ref 79–97)
MICROALBUMIN/CREAT UR: 6.9 MG/G (ref 0–29)
MONOCYTES # BLD AUTO: 0.55 10*3/MM3 (ref 0.1–0.9)
MONOCYTES NFR BLD AUTO: 9.5 % (ref 5–12)
NEUTROPHILS NFR BLD AUTO: 1.96 10*3/MM3 (ref 1.7–7)
NEUTROPHILS NFR BLD AUTO: 33.9 % (ref 42.7–76)
NRBC BLD AUTO-RTO: 0 /100 WBC (ref 0–0.2)
PLATELET # BLD AUTO: 238 10*3/MM3 (ref 140–450)
PMV BLD AUTO: 11.1 FL (ref 6–12)
POTASSIUM SERPL-SCNC: 3.6 MMOL/L (ref 3.5–5.2)
PROT SERPL-MCNC: 7.6 G/DL (ref 6–8.5)
RBC # BLD AUTO: 4.8 10*6/MM3 (ref 3.77–5.28)
SODIUM SERPL-SCNC: 136 MMOL/L (ref 136–145)
TRIGL SERPL-MCNC: 117 MG/DL (ref 0–150)
TSH SERPL DL<=0.05 MIU/L-ACNC: 2.12 UIU/ML (ref 0.27–4.2)
VLDLC SERPL-MCNC: 21 MG/DL (ref 5–40)
WBC NRBC COR # BLD AUTO: 5.78 10*3/MM3 (ref 3.4–10.8)

## 2025-08-27 PROCEDURE — 36415 COLL VENOUS BLD VENIPUNCTURE: CPT

## 2025-08-27 PROCEDURE — 82570 ASSAY OF URINE CREATININE: CPT

## 2025-08-27 PROCEDURE — 80061 LIPID PANEL: CPT

## 2025-08-27 PROCEDURE — 83036 HEMOGLOBIN GLYCOSYLATED A1C: CPT

## 2025-08-27 PROCEDURE — 80050 GENERAL HEALTH PANEL: CPT

## 2025-08-27 PROCEDURE — 82043 UR ALBUMIN QUANTITATIVE: CPT

## (undated) DEVICE — CATH F5 INF JR 4 100CM: Brand: INFINITI

## (undated) DEVICE — GLV SURG SENSICARE PI LF PF 7.5 GRN STRL

## (undated) DEVICE — CATH GUIDE LAUNCHER EBU3.5 6F 100CM

## (undated) DEVICE — SOL IRR H2O BTL 1000ML STRL

## (undated) DEVICE — SOL IRRG H2O PL/BG 1000ML STRL

## (undated) DEVICE — SNAR E/S POLYP SNAREMASTER OVL/10MM 2.8X2300MM YEL

## (undated) DEVICE — TUBING, SUCTION, 1/4" X 10', STRAIGHT: Brand: MEDLINE

## (undated) DEVICE — RADIFOCUS OPTITORQUE ANGIOGRAPHIC CATHETER: Brand: OPTITORQUE

## (undated) DEVICE — STERILE POLYISOPRENE POWDER-FREE SURGICAL GLOVES: Brand: PROTEXIS

## (undated) DEVICE — GLIDESHEATH SLENDER STAINLESS STEEL KIT: Brand: GLIDESHEATH SLENDER

## (undated) DEVICE — COLON KIT: Brand: MEDLINE INDUSTRIES, INC.

## (undated) DEVICE — THE SINGLE USE ETRAP – POLYP TRAP IS USED FOR SUCTION RETRIEVAL OF ENDOSCOPICALLY REMOVED POLYPS.: Brand: ETRAP

## (undated) DEVICE — Device: Brand: DEFENDO AIR/WATER/SUCTION AND BIOPSY VALVE

## (undated) DEVICE — GW FC FLOP/TP .035 260CM 3MM